# Patient Record
Sex: MALE | Race: BLACK OR AFRICAN AMERICAN
[De-identification: names, ages, dates, MRNs, and addresses within clinical notes are randomized per-mention and may not be internally consistent; named-entity substitution may affect disease eponyms.]

---

## 2020-10-09 PROBLEM — Z00.00 ENCOUNTER FOR PREVENTIVE HEALTH EXAMINATION: Status: ACTIVE | Noted: 2020-10-09

## 2020-12-14 ENCOUNTER — APPOINTMENT (OUTPATIENT)
Dept: OTOLARYNGOLOGY | Facility: CLINIC | Age: 55
End: 2020-12-14
Payer: MEDICARE

## 2020-12-14 DIAGNOSIS — R09.82 POSTNASAL DRIP: ICD-10-CM

## 2020-12-14 DIAGNOSIS — H61.23 IMPACTED CERUMEN, BILATERAL: ICD-10-CM

## 2020-12-14 DIAGNOSIS — J02.9 ACUTE PHARYNGITIS, UNSPECIFIED: ICD-10-CM

## 2020-12-14 DIAGNOSIS — G47.30 SLEEP APNEA, UNSPECIFIED: ICD-10-CM

## 2020-12-14 PROCEDURE — 99203 OFFICE O/P NEW LOW 30 MIN: CPT | Mod: 25

## 2020-12-14 PROCEDURE — 69210 REMOVE IMPACTED EAR WAX UNI: CPT

## 2020-12-14 NOTE — PHYSICAL EXAM
[Midline] : trachea located in midline position [Normal] : no rashes [de-identified] : bilateral cerumen impaction

## 2020-12-14 NOTE — HISTORY OF PRESENT ILLNESS
[de-identified] : Patient here today with c/o clogged ears and sore throat. Patient has a h/o cerumen impaction.  He has had sore throat for one week. No dysphagia. He has PND, has used flonase in the past but is not currently using it.

## 2020-12-18 ENCOUNTER — APPOINTMENT (OUTPATIENT)
Dept: GASTROENTEROLOGY | Facility: CLINIC | Age: 55
End: 2020-12-18

## 2020-12-21 ENCOUNTER — APPOINTMENT (OUTPATIENT)
Dept: OTOLARYNGOLOGY | Facility: CLINIC | Age: 55
End: 2020-12-21

## 2021-01-29 ENCOUNTER — APPOINTMENT (OUTPATIENT)
Dept: GASTROENTEROLOGY | Facility: CLINIC | Age: 56
End: 2021-01-29

## 2021-02-23 ENCOUNTER — APPOINTMENT (OUTPATIENT)
Dept: OPHTHALMOLOGY | Facility: CLINIC | Age: 56
End: 2021-02-23

## 2021-03-08 ENCOUNTER — APPOINTMENT (OUTPATIENT)
Dept: INTERNAL MEDICINE | Facility: CLINIC | Age: 56
End: 2021-03-08

## 2021-04-08 ENCOUNTER — OUTPATIENT (OUTPATIENT)
Dept: OUTPATIENT SERVICES | Facility: HOSPITAL | Age: 56
LOS: 1 days | Discharge: HOME | End: 2021-04-08

## 2021-04-08 DIAGNOSIS — Z01.21 ENCOUNTER FOR DENTAL EXAMINATION AND CLEANING WITH ABNORMAL FINDINGS: ICD-10-CM

## 2021-06-16 ENCOUNTER — APPOINTMENT (OUTPATIENT)
Dept: PODIATRY | Facility: CLINIC | Age: 56
End: 2021-06-16

## 2021-07-12 ENCOUNTER — APPOINTMENT (OUTPATIENT)
Dept: PEDIATRIC DEVELOPMENTAL SERVICES | Facility: CLINIC | Age: 56
End: 2021-07-12

## 2021-10-04 ENCOUNTER — OUTPATIENT (OUTPATIENT)
Dept: OUTPATIENT SERVICES | Facility: HOSPITAL | Age: 56
LOS: 1 days | Discharge: HOME | End: 2021-10-04
Payer: MEDICARE

## 2021-10-04 VITALS
RESPIRATION RATE: 16 BRPM | OXYGEN SATURATION: 96 % | WEIGHT: 158.07 LBS | SYSTOLIC BLOOD PRESSURE: 110 MMHG | DIASTOLIC BLOOD PRESSURE: 64 MMHG | HEIGHT: 60 IN | HEART RATE: 54 BPM | TEMPERATURE: 98 F

## 2021-10-04 DIAGNOSIS — Z01.818 ENCOUNTER FOR OTHER PREPROCEDURAL EXAMINATION: ICD-10-CM

## 2021-10-04 DIAGNOSIS — K02.9 DENTAL CARIES, UNSPECIFIED: ICD-10-CM

## 2021-10-04 DIAGNOSIS — Z98.890 OTHER SPECIFIED POSTPROCEDURAL STATES: Chronic | ICD-10-CM

## 2021-10-04 PROCEDURE — 93010 ELECTROCARDIOGRAM REPORT: CPT

## 2021-10-04 NOTE — H&P PST ADULT - ADDITIONAL PE
follows simple commands, remained non-verbal, attempted to draw blood and he started screaming. Was unable to do an ENT exam -pt un co-operative.

## 2021-10-04 NOTE — H&P PST ADULT - NSANTHOSAYNRD_GEN_A_CORE
No. KARIE screening performed.  STOP BANG Legend: 0-2 = LOW Risk; 3-4 = INTERMEDIATE Risk; 5-8 = HIGH Risk

## 2021-10-04 NOTE — H&P PST ADULT - HISTORY OF PRESENT ILLNESS
56 yr old male with Down's syndrome accompanied by his mother and HHA. Mother is a poor historian "I am 84yrs old I don't remember that much". She states " they need to pull out teeth". Is scheduled for Complete oral rehab under [facility]. Recd COVID vaccine. Verbalized understanding of COVID prevention measures. Exercise becca 1-2 flat blocks LTD by poor co-ordination and balancePt un co-operative. Unable to draw blood. Needs CBC, CMP. Also, per his mother -will not be possible to do COVID test 10/19 because ". he will not let anybody touch him". Usually, labs are done after " I give him a pill and he is sleepy  and they come and draw blood at home". Labs and COVID PCR tests have to be co -ordinated ? possible same day-10/19? Needs PCP eval-ACP Dr. Pino.  Anesthesia Alert  YES--Difficult Airway  NO--History of neck surgery or radiation  NO--Limited ROM of neck  NO--History of Malignant hyperthermia  NO--Personal or family history of Pseudocholinesterase deficiency  NO--Prior Anesthesia Complication  NO--Latex Allergy  NO--Loose teeth  NO--History of Rheumatoid Arthritis  NO--KARIE  No Bleeding risk  YES H/O Down's [PT unco-operative was not able to get pre-op C.Spine views___

## 2021-10-19 ENCOUNTER — OUTPATIENT (OUTPATIENT)
Dept: OUTPATIENT SERVICES | Facility: HOSPITAL | Age: 56
LOS: 1 days | Discharge: HOME | End: 2021-10-19

## 2021-10-19 DIAGNOSIS — Z11.59 ENCOUNTER FOR SCREENING FOR OTHER VIRAL DISEASES: ICD-10-CM

## 2021-10-19 PROBLEM — F03.90 UNSPECIFIED DEMENTIA WITHOUT BEHAVIORAL DISTURBANCE: Chronic | Status: ACTIVE | Noted: 2021-10-04

## 2021-10-19 PROBLEM — Q90.9 DOWN SYNDROME, UNSPECIFIED: Chronic | Status: ACTIVE | Noted: 2021-10-04

## 2021-10-19 PROBLEM — F03.90 UNSPECIFIED DEMENTIA, UNSPECIFIED SEVERITY, WITHOUT BEHAVIORAL DISTURBANCE, PSYCHOTIC DISTURBANCE, MOOD DISTURBANCE, AND ANXIETY: Chronic | Status: ACTIVE | Noted: 2021-10-04

## 2021-10-22 ENCOUNTER — OUTPATIENT (OUTPATIENT)
Dept: OUTPATIENT SERVICES | Facility: HOSPITAL | Age: 56
LOS: 1 days | Discharge: HOME | End: 2021-10-22

## 2021-10-22 VITALS
HEIGHT: 60 IN | TEMPERATURE: 98 F | SYSTOLIC BLOOD PRESSURE: 108 MMHG | HEART RATE: 60 BPM | RESPIRATION RATE: 20 BRPM | WEIGHT: 158.07 LBS | DIASTOLIC BLOOD PRESSURE: 67 MMHG | OXYGEN SATURATION: 100 %

## 2021-10-22 VITALS
RESPIRATION RATE: 20 BRPM | HEART RATE: 60 BPM | DIASTOLIC BLOOD PRESSURE: 53 MMHG | SYSTOLIC BLOOD PRESSURE: 111 MMHG | OXYGEN SATURATION: 100 %

## 2021-10-22 DIAGNOSIS — K02.9 DENTAL CARIES, UNSPECIFIED: ICD-10-CM

## 2021-10-22 LAB
ALBUMIN SERPL ELPH-MCNC: 3.4 G/DL — LOW (ref 3.5–5.2)
ALP SERPL-CCNC: 81 U/L — SIGNIFICANT CHANGE UP (ref 30–115)
ALT FLD-CCNC: 9 U/L — SIGNIFICANT CHANGE UP (ref 0–41)
ANION GAP SERPL CALC-SCNC: 12 MMOL/L — SIGNIFICANT CHANGE UP (ref 7–14)
AST SERPL-CCNC: 19 U/L — SIGNIFICANT CHANGE UP (ref 0–41)
BASOPHILS # BLD AUTO: 0.07 K/UL — SIGNIFICANT CHANGE UP (ref 0–0.2)
BASOPHILS NFR BLD AUTO: 1.8 % — HIGH (ref 0–1)
BILIRUB SERPL-MCNC: 0.3 MG/DL — SIGNIFICANT CHANGE UP (ref 0.2–1.2)
BUN SERPL-MCNC: 13 MG/DL — SIGNIFICANT CHANGE UP (ref 10–20)
CALCIUM SERPL-MCNC: 8.3 MG/DL — LOW (ref 8.5–10.1)
CHLORIDE SERPL-SCNC: 103 MMOL/L — SIGNIFICANT CHANGE UP (ref 98–110)
CHOLEST SERPL-MCNC: 166 MG/DL — SIGNIFICANT CHANGE UP
CO2 SERPL-SCNC: 23 MMOL/L — SIGNIFICANT CHANGE UP (ref 17–32)
CREAT SERPL-MCNC: 0.7 MG/DL — SIGNIFICANT CHANGE UP (ref 0.7–1.5)
EOSINOPHIL # BLD AUTO: 0.1 K/UL — SIGNIFICANT CHANGE UP (ref 0–0.7)
EOSINOPHIL NFR BLD AUTO: 2.6 % — SIGNIFICANT CHANGE UP (ref 0–8)
GLUCOSE SERPL-MCNC: 107 MG/DL — HIGH (ref 70–99)
HCT VFR BLD CALC: 38 % — LOW (ref 42–52)
HDLC SERPL-MCNC: 42 MG/DL — SIGNIFICANT CHANGE UP
HGB BLD-MCNC: 12.8 G/DL — LOW (ref 14–18)
IMM GRANULOCYTES NFR BLD AUTO: 0.3 % — SIGNIFICANT CHANGE UP (ref 0.1–0.3)
LIPID PNL WITH DIRECT LDL SERPL: 109 MG/DL — HIGH
LYMPHOCYTES # BLD AUTO: 1.1 K/UL — LOW (ref 1.2–3.4)
LYMPHOCYTES # BLD AUTO: 28.8 % — SIGNIFICANT CHANGE UP (ref 20.5–51.1)
MCHC RBC-ENTMCNC: 31.6 PG — HIGH (ref 27–31)
MCHC RBC-ENTMCNC: 33.7 G/DL — SIGNIFICANT CHANGE UP (ref 32–37)
MCV RBC AUTO: 93.8 FL — SIGNIFICANT CHANGE UP (ref 80–94)
MONOCYTES # BLD AUTO: 0.33 K/UL — SIGNIFICANT CHANGE UP (ref 0.1–0.6)
MONOCYTES NFR BLD AUTO: 8.6 % — SIGNIFICANT CHANGE UP (ref 1.7–9.3)
NEUTROPHILS # BLD AUTO: 2.21 K/UL — SIGNIFICANT CHANGE UP (ref 1.4–6.5)
NEUTROPHILS NFR BLD AUTO: 57.9 % — SIGNIFICANT CHANGE UP (ref 42.2–75.2)
NON HDL CHOLESTEROL: 124 MG/DL — SIGNIFICANT CHANGE UP
NRBC # BLD: 0 /100 WBCS — SIGNIFICANT CHANGE UP (ref 0–0)
PLATELET # BLD AUTO: 258 K/UL — SIGNIFICANT CHANGE UP (ref 130–400)
POTASSIUM SERPL-MCNC: 4.1 MMOL/L — SIGNIFICANT CHANGE UP (ref 3.5–5)
POTASSIUM SERPL-SCNC: 4.1 MMOL/L — SIGNIFICANT CHANGE UP (ref 3.5–5)
PROT SERPL-MCNC: 6.1 G/DL — SIGNIFICANT CHANGE UP (ref 6–8)
RBC # BLD: 4.05 M/UL — LOW (ref 4.7–6.1)
RBC # FLD: 13.4 % — SIGNIFICANT CHANGE UP (ref 11.5–14.5)
SODIUM SERPL-SCNC: 138 MMOL/L — SIGNIFICANT CHANGE UP (ref 135–146)
T4 AB SER-ACNC: 7.2 UG/DL — SIGNIFICANT CHANGE UP (ref 4.6–12)
TRIGL SERPL-MCNC: 87 MG/DL — SIGNIFICANT CHANGE UP
TSH SERPL-MCNC: 1.43 UIU/ML — SIGNIFICANT CHANGE UP (ref 0.27–4.2)
WBC # BLD: 3.82 K/UL — LOW (ref 4.8–10.8)
WBC # FLD AUTO: 3.82 K/UL — LOW (ref 4.8–10.8)

## 2021-10-22 RX ORDER — MIDAZOLAM HYDROCHLORIDE 1 MG/ML
20 INJECTION, SOLUTION INTRAMUSCULAR; INTRAVENOUS ONCE
Refills: 0 | Status: DISCONTINUED | OUTPATIENT
Start: 2021-10-22 | End: 2021-10-22

## 2021-10-22 RX ORDER — ONDANSETRON 8 MG/1
4 TABLET, FILM COATED ORAL ONCE
Refills: 0 | Status: DISCONTINUED | OUTPATIENT
Start: 2021-10-22 | End: 2021-11-05

## 2021-10-22 RX ORDER — SODIUM CHLORIDE 9 MG/ML
1000 INJECTION, SOLUTION INTRAVENOUS
Refills: 0 | Status: DISCONTINUED | OUTPATIENT
Start: 2021-10-22 | End: 2021-11-05

## 2021-10-22 RX ORDER — HYDROMORPHONE HYDROCHLORIDE 2 MG/ML
0.5 INJECTION INTRAMUSCULAR; INTRAVENOUS; SUBCUTANEOUS
Refills: 0 | Status: DISCONTINUED | OUTPATIENT
Start: 2021-10-22 | End: 2021-10-22

## 2021-10-22 RX ADMIN — MIDAZOLAM HYDROCHLORIDE 20 MILLIGRAM(S): 1 INJECTION, SOLUTION INTRAMUSCULAR; INTRAVENOUS at 09:39

## 2021-10-22 RX ADMIN — SODIUM CHLORIDE 100 MILLILITER(S): 9 INJECTION, SOLUTION INTRAVENOUS at 12:18

## 2021-10-22 NOTE — ASU DISCHARGE PLAN (ADULT/PEDIATRIC) - ASU DC SPECIAL INSTRUCTIONSFT
Nothing hard/crunchy, nothing hot/spicy, nothing through a straw, no spitting for at least 48 hours. Soft/liquid diet, progress slowly. If any concerns please contact dental resident on call at [phone].

## 2021-10-22 NOTE — BRIEF OPERATIVE NOTE - NSICDXBRIEFPROCEDURE_GEN_ALL_CORE_FT
PROCEDURES:  Dental exam, with x-ray imaging, dental cleaning, and restoration 22-Oct-2021 10:13:23  Veronica Tobar

## 2021-10-22 NOTE — BRIEF OPERATIVE NOTE - OPERATION/FINDINGS
Complete Oral Rehabilitation included:  Full mouth Exam, _____ xrays, Prophylaxis and  Fluoride treatment  Extractions of teeth:  Composite restorations of teeth:  Amalgam restorations of teeth: Complete Oral Rehabilitation included:  Full mouth Exam, 14 xrays, Prophylaxis and  Fluoride treatment  Extractions of teeth: 10, 11, 17, 19, 28  Amalgam restorations of teeth: 20

## 2021-10-27 ENCOUNTER — OUTPATIENT (OUTPATIENT)
Dept: OUTPATIENT SERVICES | Facility: HOSPITAL | Age: 56
LOS: 1 days | Discharge: HOME | End: 2021-10-27

## 2021-10-27 DIAGNOSIS — Z98.818 OTHER DENTAL PROCEDURE STATUS: ICD-10-CM

## 2021-10-27 PROBLEM — Z86.69 PERSONAL HISTORY OF OTHER DISEASES OF THE NERVOUS SYSTEM AND SENSE ORGANS: Chronic | Status: ACTIVE | Noted: 2021-10-22

## 2021-10-28 DIAGNOSIS — K02.9 DENTAL CARIES, UNSPECIFIED: ICD-10-CM

## 2021-10-28 DIAGNOSIS — Z88.0 ALLERGY STATUS TO PENICILLIN: ICD-10-CM

## 2021-10-28 DIAGNOSIS — Q90.9 DOWN SYNDROME, UNSPECIFIED: ICD-10-CM

## 2021-10-28 DIAGNOSIS — F03.90 UNSPECIFIED DEMENTIA WITHOUT BEHAVIORAL DISTURBANCE: ICD-10-CM

## 2022-08-24 ENCOUNTER — OUTPATIENT (OUTPATIENT)
Dept: OUTPATIENT SERVICES | Facility: HOSPITAL | Age: 57
LOS: 1 days | Discharge: HOME | End: 2022-08-24

## 2022-09-06 DIAGNOSIS — Z01.20 ENCOUNTER FOR DENTAL EXAMINATION AND CLEANING WITHOUT ABNORMAL FINDINGS: ICD-10-CM

## 2023-09-29 NOTE — PRE-ANESTHESIA EVALUATION ADULT - NSANTHPEFT_GEN_ALL_CORE
.  Continue current dose atorvastatin 20 mg, we will plan on rechecking fasting lipid profile in the next 3 months and reevaluate medication most recent LDL was 139, and can adjust dosing if needed.  
Blood pressure is reasonably well controlled, continue current regiment.  
Overall her volume status is improved, and continues to have NYHA class II symptoms.  New medication regimen as outlined under heart failure.  Previous echo showed reduced EF of 36 to 40%.  Plan to repeat echocardiogram to reassess LV function.  
She is stable without any anginal symptoms.  Continue DAPT with aspirin and Plavix, continue carvedilol, and atorvastatin.  
Volume status significantly improved.  Continue carvedilol, lisinopril, and Farxiga.  We will continue using furosemide on a as needed basis for weight gain and swelling.  Avoiding spironolactone at this point due to hyponatremia during her hospitalization.  
lung cta bl  cv rrr s1s2

## 2023-10-05 ENCOUNTER — EMERGENCY (EMERGENCY)
Facility: HOSPITAL | Age: 58
LOS: 0 days | Discharge: HOME | End: 2023-10-05
Attending: EMERGENCY MEDICINE
Payer: MEDICARE

## 2023-10-05 VITALS
RESPIRATION RATE: 18 BRPM | TEMPERATURE: 98 F | WEIGHT: 143.08 LBS | SYSTOLIC BLOOD PRESSURE: 125 MMHG | OXYGEN SATURATION: 99 % | HEART RATE: 67 BPM | DIASTOLIC BLOOD PRESSURE: 60 MMHG

## 2023-10-05 VITALS
RESPIRATION RATE: 16 BRPM | DIASTOLIC BLOOD PRESSURE: 68 MMHG | SYSTOLIC BLOOD PRESSURE: 130 MMHG | HEART RATE: 68 BPM | OXYGEN SATURATION: 99 %

## 2023-10-05 DIAGNOSIS — R63.0 ANOREXIA: ICD-10-CM

## 2023-10-05 DIAGNOSIS — Q90.9 DOWN SYNDROME, UNSPECIFIED: ICD-10-CM

## 2023-10-05 DIAGNOSIS — Z88.0 ALLERGY STATUS TO PENICILLIN: ICD-10-CM

## 2023-10-05 DIAGNOSIS — F03.90 UNSPECIFIED DEMENTIA WITHOUT BEHAVIORAL DISTURBANCE: ICD-10-CM

## 2023-10-05 DIAGNOSIS — R10.31 RIGHT LOWER QUADRANT PAIN: ICD-10-CM

## 2023-10-05 LAB
ALBUMIN SERPL ELPH-MCNC: 4.1 G/DL — SIGNIFICANT CHANGE UP (ref 3.5–5.2)
ALP SERPL-CCNC: 89 U/L — SIGNIFICANT CHANGE UP (ref 30–115)
ALT FLD-CCNC: 10 U/L — SIGNIFICANT CHANGE UP (ref 0–41)
ANION GAP SERPL CALC-SCNC: 11 MMOL/L — SIGNIFICANT CHANGE UP (ref 7–14)
AST SERPL-CCNC: 28 U/L — SIGNIFICANT CHANGE UP (ref 0–41)
BASOPHILS # BLD AUTO: 0.04 K/UL — SIGNIFICANT CHANGE UP (ref 0–0.2)
BASOPHILS NFR BLD AUTO: 0.7 % — SIGNIFICANT CHANGE UP (ref 0–1)
BILIRUB DIRECT SERPL-MCNC: <0.2 MG/DL — SIGNIFICANT CHANGE UP (ref 0–0.3)
BILIRUB INDIRECT FLD-MCNC: >0.2 MG/DL — SIGNIFICANT CHANGE UP (ref 0.2–1.2)
BILIRUB SERPL-MCNC: 0.3 MG/DL — SIGNIFICANT CHANGE UP (ref 0.2–1.2)
BILIRUB SERPL-MCNC: 0.4 MG/DL — SIGNIFICANT CHANGE UP (ref 0.2–1.2)
BUN SERPL-MCNC: 15 MG/DL — SIGNIFICANT CHANGE UP (ref 10–20)
CALCIUM SERPL-MCNC: 9.8 MG/DL — SIGNIFICANT CHANGE UP (ref 8.4–10.4)
CHLORIDE SERPL-SCNC: 104 MMOL/L — SIGNIFICANT CHANGE UP (ref 98–110)
CO2 SERPL-SCNC: 26 MMOL/L — SIGNIFICANT CHANGE UP (ref 17–32)
CREAT SERPL-MCNC: 0.9 MG/DL — SIGNIFICANT CHANGE UP (ref 0.7–1.5)
EGFR: 100 ML/MIN/1.73M2 — SIGNIFICANT CHANGE UP
EOSINOPHIL # BLD AUTO: 0.28 K/UL — SIGNIFICANT CHANGE UP (ref 0–0.7)
EOSINOPHIL NFR BLD AUTO: 4.7 % — SIGNIFICANT CHANGE UP (ref 0–8)
GLUCOSE SERPL-MCNC: 100 MG/DL — HIGH (ref 70–99)
HCT VFR BLD CALC: 44.3 % — SIGNIFICANT CHANGE UP (ref 42–52)
HGB BLD-MCNC: 14.5 G/DL — SIGNIFICANT CHANGE UP (ref 14–18)
IMM GRANULOCYTES NFR BLD AUTO: 0.2 % — SIGNIFICANT CHANGE UP (ref 0.1–0.3)
LACTATE SERPL-SCNC: 3.8 MMOL/L — HIGH (ref 0.7–2)
LIDOCAIN IGE QN: 15 U/L — SIGNIFICANT CHANGE UP (ref 7–60)
LYMPHOCYTES # BLD AUTO: 1.81 K/UL — SIGNIFICANT CHANGE UP (ref 1.2–3.4)
LYMPHOCYTES # BLD AUTO: 30.6 % — SIGNIFICANT CHANGE UP (ref 20.5–51.1)
MAGNESIUM SERPL-MCNC: 2.1 MG/DL — SIGNIFICANT CHANGE UP (ref 1.8–2.4)
MCHC RBC-ENTMCNC: 31.9 PG — HIGH (ref 27–31)
MCHC RBC-ENTMCNC: 32.7 G/DL — SIGNIFICANT CHANGE UP (ref 32–37)
MCV RBC AUTO: 97.4 FL — HIGH (ref 80–94)
MONOCYTES # BLD AUTO: 0.42 K/UL — SIGNIFICANT CHANGE UP (ref 0.1–0.6)
MONOCYTES NFR BLD AUTO: 7.1 % — SIGNIFICANT CHANGE UP (ref 1.7–9.3)
NEUTROPHILS # BLD AUTO: 3.35 K/UL — SIGNIFICANT CHANGE UP (ref 1.4–6.5)
NEUTROPHILS NFR BLD AUTO: 56.7 % — SIGNIFICANT CHANGE UP (ref 42.2–75.2)
NRBC # BLD: 0 /100 WBCS — SIGNIFICANT CHANGE UP (ref 0–0)
PLATELET # BLD AUTO: 233 K/UL — SIGNIFICANT CHANGE UP (ref 130–400)
PMV BLD: 10.2 FL — SIGNIFICANT CHANGE UP (ref 7.4–10.4)
POTASSIUM SERPL-MCNC: 4.2 MMOL/L — SIGNIFICANT CHANGE UP (ref 3.5–5)
POTASSIUM SERPL-SCNC: 4.2 MMOL/L — SIGNIFICANT CHANGE UP (ref 3.5–5)
PROT SERPL-MCNC: 7.9 G/DL — SIGNIFICANT CHANGE UP (ref 6–8)
RBC # BLD: 4.55 M/UL — LOW (ref 4.7–6.1)
RBC # FLD: 13.8 % — SIGNIFICANT CHANGE UP (ref 11.5–14.5)
SODIUM SERPL-SCNC: 141 MMOL/L — SIGNIFICANT CHANGE UP (ref 135–146)
WBC # BLD: 5.91 K/UL — SIGNIFICANT CHANGE UP (ref 4.8–10.8)
WBC # FLD AUTO: 5.91 K/UL — SIGNIFICANT CHANGE UP (ref 4.8–10.8)

## 2023-10-05 PROCEDURE — 74177 CT ABD & PELVIS W/CONTRAST: CPT | Mod: 26,MA

## 2023-10-05 PROCEDURE — 82247 BILIRUBIN TOTAL: CPT

## 2023-10-05 PROCEDURE — 80053 COMPREHEN METABOLIC PANEL: CPT

## 2023-10-05 PROCEDURE — 82248 BILIRUBIN DIRECT: CPT

## 2023-10-05 PROCEDURE — 36415 COLL VENOUS BLD VENIPUNCTURE: CPT

## 2023-10-05 PROCEDURE — 93010 ELECTROCARDIOGRAM REPORT: CPT

## 2023-10-05 PROCEDURE — 83605 ASSAY OF LACTIC ACID: CPT

## 2023-10-05 PROCEDURE — 99285 EMERGENCY DEPT VISIT HI MDM: CPT | Mod: FS

## 2023-10-05 PROCEDURE — 85025 COMPLETE CBC W/AUTO DIFF WBC: CPT

## 2023-10-05 PROCEDURE — 93005 ELECTROCARDIOGRAM TRACING: CPT

## 2023-10-05 PROCEDURE — 99285 EMERGENCY DEPT VISIT HI MDM: CPT | Mod: 25

## 2023-10-05 PROCEDURE — 83690 ASSAY OF LIPASE: CPT

## 2023-10-05 PROCEDURE — 83735 ASSAY OF MAGNESIUM: CPT

## 2023-10-05 PROCEDURE — 96372 THER/PROPH/DIAG INJ SC/IM: CPT

## 2023-10-05 PROCEDURE — 74177 CT ABD & PELVIS W/CONTRAST: CPT | Mod: MA

## 2023-10-05 RX ORDER — HALOPERIDOL DECANOATE 100 MG/ML
5 INJECTION INTRAMUSCULAR ONCE
Refills: 0 | Status: COMPLETED | OUTPATIENT
Start: 2023-10-05 | End: 2023-10-05

## 2023-10-05 RX ORDER — DIPHENHYDRAMINE HCL 50 MG
50 CAPSULE ORAL ONCE
Refills: 0 | Status: COMPLETED | OUTPATIENT
Start: 2023-10-05 | End: 2023-10-05

## 2023-10-05 RX ORDER — HALOPERIDOL DECANOATE 100 MG/ML
5 INJECTION INTRAMUSCULAR ONCE
Refills: 0 | Status: DISCONTINUED | OUTPATIENT
Start: 2023-10-05 | End: 2023-10-05

## 2023-10-05 RX ORDER — DIPHENHYDRAMINE HCL 50 MG
25 CAPSULE ORAL ONCE
Refills: 0 | Status: DISCONTINUED | OUTPATIENT
Start: 2023-10-05 | End: 2023-10-05

## 2023-10-05 RX ORDER — SODIUM CHLORIDE 9 MG/ML
1000 INJECTION INTRAMUSCULAR; INTRAVENOUS; SUBCUTANEOUS ONCE
Refills: 0 | Status: COMPLETED | OUTPATIENT
Start: 2023-10-05 | End: 2023-10-05

## 2023-10-05 RX ADMIN — Medication 2 MILLIGRAM(S): at 16:48

## 2023-10-05 RX ADMIN — Medication 1 MILLIGRAM(S): at 15:23

## 2023-10-05 RX ADMIN — Medication 50 MILLIGRAM(S): at 16:48

## 2023-10-05 RX ADMIN — HALOPERIDOL DECANOATE 5 MILLIGRAM(S): 100 INJECTION INTRAMUSCULAR at 16:47

## 2023-10-05 RX ADMIN — Medication 1 MILLIGRAM(S): at 13:53

## 2023-10-05 RX ADMIN — HALOPERIDOL DECANOATE 5 MILLIGRAM(S): 100 INJECTION INTRAMUSCULAR at 15:23

## 2023-10-05 RX ADMIN — SODIUM CHLORIDE 1000 MILLILITER(S): 9 INJECTION INTRAMUSCULAR; INTRAVENOUS; SUBCUTANEOUS at 17:52

## 2023-10-05 NOTE — ED PROVIDER NOTE - CARE PROVIDER_API CALL
Anne Wilson  Gastroenterology  [address] [place] [address]  Phone: [phone]  Fax: [phone]  Follow Up Time:     Gabriele Rodrigues  Gastroenterology  [address] [place] [id number]  Phone: [phone]  Fax: [phone]  Follow Up Time:

## 2023-10-05 NOTE — ED PROVIDER NOTE - PATIENT PORTAL LINK FT
You can access the FollowMyHealth Patient Portal offered by [facility] by registering at the following website: [email or web address]. By joining [employer]’s FollowMyHealth portal, you will also be able to view your health information using other applications (apps) compatible with our system.

## 2023-10-05 NOTE — ED PROVIDER NOTE - NSFOLLOWUPINSTRUCTIONS_ED_ALL_ED_FT
Please follow up with GI outpatient during next available appointment.     abdominal Pain    WHAT YOU NEED TO KNOW:    The cause of your abdominal pain may not be found. If a cause is found, treatment will depend on what the cause is.     DISCHARGE INSTRUCTIONS:    Return to the emergency department if:     You vomit blood or cannot stop vomiting.      You have blood in your bowel movement or it looks like tar.       You have bleeding from your rectum.       Your abdomen is larger than usual, more painful, and hard.       You have severe pain in your abdomen.       You stop passing gas and having bowel movements.       You feel weak, dizzy, or faint.    Contact your healthcare provider if:     You have a fever.      You have new signs and symptoms.      Your symptoms do not get better with treatment.       You have questions or concerns about your condition or care.    Medicines may be given to decrease pain, treat an infection, and manage your symptoms. Take your medicine as directed. Call your healthcare provider if you think your medicine is not helping or if you have side effects. Tell him if you are allergic to any medicine. Keep a list of the medicines, vitamins, and herbs you take. Include the amounts, and when and why you take them. Bring the list or the pill bottles to follow-up visits. Carry your medicine list with you in case of an emergency.    Manage your symptoms:     Apply heat on your abdomen for 20 to 30 minutes every 2 hours for as many days as directed. Heat helps decrease pain and muscle spasms.       Manage your stress. Stress may cause abdominal pain. Your healthcare provider may recommend relaxation techniques and deep breathing exercises to help decrease your stress. Your healthcare provider may recommend you talk to someone about your stress or anxiety, such as a counselor or a trusted friend. Get plenty of sleep and exercise regularly.       Limit or do not drink alcohol. Alcohol can make your abdominal pain worse. Ask your healthcare provider if it is safe for you to drink alcohol. Also ask how much is safe for you to drink.       Do not smoke. Nicotine and other chemicals in cigarettes can damage your esophagus and stomach. Ask your healthcare provider for information if you currently smoke and need help to quit. E-cigarettes or smokeless tobacco still contain nicotine. Talk to your healthcare provider before you use these products.     Make changes to the food you eat as directed: Do not eat foods that cause abdominal pain or other symptoms. Eat small meals more often.     Eat more high-fiber foods if you are constipated. High-fiber foods include fruits, vegetables, whole-grain foods, and legumes.       Do not eat foods that cause gas if you have bloating. Examples include broccoli, cabbage, and cauliflower. Do not drink soda or carbonated drinks, because these may also cause gas.       Do not eat foods or drinks that contain sorbitol or fructose if you have diarrhea and bloating. Some examples are fruit juices, candy, jelly, and sugar-free gum.       Do not eat high-fat foods, such as fried foods, cheeseburgers, hot dogs, and desserts.      Limit or do not drink caffeine. Caffeine may make symptoms, such as heart burn or nausea, worse.       Drink plenty of liquids to prevent dehydration from diarrhea or vomiting. Ask your healthcare provider how much liquid to drink each day and which liquids are best for you.     Follow up with your healthcare provider as directed: Write down your questions so you remember to ask them during your visits.       © Copyright [employer] 2019 All illustrations and images included in CareNotes are the copyrighted property of [employer].A[employer]., Inc. or [employer]

## 2023-10-05 NOTE — ED PROVIDER NOTE - PHYSICAL EXAMINATION
Physical Exam    Constitutional: No acute distress. Non verbal  Eyes: Conjunctiva pink, Sclera clear, PERRLA, EOMI.  ENT: No sinus tenderness. No nasal discharge. No oropharyngeal erythema, edema, or exudates. Uvula midline.   Cardiovascular: Regular rate, regular rhythm. No noted murmurs rubs or gallops.  Respiratory: unlabored respiratory effort, clear to auscultation bilaterally no wheezing, rales or rhonchi  Gastrointestinal: Normal bowel sounds. soft, non distended. facial expression appears distressed when palpating abdomen  Musculoskeletal: supple neck, no midline tenderness.   Integumentary: warm, dry, no rash  Neurologic: awake, non verbal. cranial nerves II-XII grossly intact, extremities’ motor and sensory functions grossly intact  Psych: intellectually disabled, agitated while attempting to examine

## 2023-10-05 NOTE — ED PROVIDER NOTE - NS ED ATTENDING STATEMENT MOD
This was a shared visit with the GRATN. I reviewed and verified the documentation and independently performed the documented:

## 2023-10-05 NOTE — ED PROVIDER NOTE - OBJECTIVE STATEMENT
58 year old male with a history of Down's syndrome accompanied by his HHA and Mother presents to the ED with abdominal pain. 58 year old male with a history of Down's syndrome accompanied by his HHA and Mother presents to the ED with abdominal pain. Patient is non verbal therefore history obtained from patients mother. Patient has been noted to hold his RLQ of his abdomen consistently x 4 months and seems to his Mother that he is in pain. He has had unwanted weight loss at home and decreased appetite, unsure exact amount but used to wear size 38 pants and now wears 34. No noted fevers, cough, shortness of breath, vomiting, diarrhea, bloody stools, trauma.

## 2023-10-05 NOTE — ED PROVIDER NOTE - CARE PROVIDERS DIRECT ADDRESSES
[email or web address]

## 2023-10-05 NOTE — ED PROVIDER NOTE - CLINICAL SUMMARY MEDICAL DECISION MAKING FREE TEXT BOX
PT with R abd pain?, weight loss, for a few months.  here for eval.  required several doses of sedative med to obtain labs and imaging.  neg w/u in ED.  pt to f/u with pmd. dr. tavares.  Any ordered labs and EKG were reviewed.  Any imaging was ordered and reviewed by me.  Appropriate medications for patient's presenting complaints were ordered and effects were reassessed.  Patient's records (prior hospital, ED visit, and/or nursing home notes if available) were reviewed.  Additional history was obtained from EMS, family, and/or PCP (where available).  Escalation to admission/observation was considered.  1) However patient feels much better and is comfortable with discharge.  Appropriate follow-up was arranged.

## 2023-10-05 NOTE — ED ADULT TRIAGE NOTE - CHIEF COMPLAINT QUOTE
Patient presents to ED with complaints of R sided abdominal pain since yesterday. Mother reports weight loss over 5 months. SNP/nonverbal at baseline/Dementia

## 2023-10-05 NOTE — ED PROVIDER NOTE - PROGRESS NOTE DETAILS
EP: Patient Dors to Dr. Yi to follow-up labs, imaging and reassess and dispo. SHRUTI Kitchen - Multiple attempts at IV attempted, ultimately succeeded after 2 sedation attempts. Workup negative and results were explained to patients mother. Patient will be discharged with GI follow up. SHRUTI Kitchen - Multiple attempts at IV attempted with sedation, ultimately succeeded after 2 sedation attempts. Workup negative and results were explained to patients mother. Patient will be discharged with GI follow up.

## 2023-10-05 NOTE — ED PROVIDER NOTE - ATTENDING APP SHARED VISIT CONTRIBUTION OF CARE
58-year-old male PMH dementia, nonverbal, Down syndrome presenting from home with mother and home health aide for evaluation of weight loss for the past several months.  In addition, mother reports that her son has been holding his right side  with his hand for the past several months.  No recent change in symptoms such as nausea vomiting, fever chills, cough, bloody stools, bloody urine.  Patient is a difficult to evaluate by a medical professional, did not have any recent blood work done, though had x-ray done at home for evaluation of these complaints. Middle-age male sitting in stretcher, does not appear to be in any acute distress, PERRL, stigmata of Down syndrome, lungs clear to auscultation bilaterally, equal air entry, no midline spine tenderness to palpation no CVA TTP, RRR, well-perfused extremities, abdomen soft/NT/ND, no leg edema, full range of motion at all joints, patient is awake and alert.  Plan: Labs, CT abdomen pelvis, reassess.  Will require sedation to facilitate medical evaluation.

## 2024-01-11 ENCOUNTER — APPOINTMENT (OUTPATIENT)
Dept: GASTROENTEROLOGY | Facility: CLINIC | Age: 59
End: 2024-01-11
Payer: MEDICARE

## 2024-01-11 VITALS — BODY MASS INDEX: 24.63 KG/M2 | HEIGHT: 63 IN | WEIGHT: 139 LBS

## 2024-01-11 DIAGNOSIS — Z86.59 PERSONAL HISTORY OF OTHER MENTAL AND BEHAVIORAL DISORDERS: ICD-10-CM

## 2024-01-11 DIAGNOSIS — Z78.9 OTHER SPECIFIED HEALTH STATUS: ICD-10-CM

## 2024-01-11 DIAGNOSIS — Z82.49 FAMILY HISTORY OF ISCHEMIC HEART DISEASE AND OTHER DISEASES OF THE CIRCULATORY SYSTEM: ICD-10-CM

## 2024-01-11 DIAGNOSIS — R10.9 UNSPECIFIED ABDOMINAL PAIN: ICD-10-CM

## 2024-01-11 DIAGNOSIS — R63.4 ABNORMAL WEIGHT LOSS: ICD-10-CM

## 2024-01-11 DIAGNOSIS — K59.00 CONSTIPATION, UNSPECIFIED: ICD-10-CM

## 2024-01-11 DIAGNOSIS — Z12.11 ENCOUNTER FOR SCREENING FOR MALIGNANT NEOPLASM OF COLON: ICD-10-CM

## 2024-01-11 PROCEDURE — 99204 OFFICE O/P NEW MOD 45 MIN: CPT

## 2024-01-11 RX ORDER — POLYETHYLENE GLYCOL 3350 AND ELECTROLYTES WITH LEMON FLAVOR 236; 22.74; 6.74; 5.86; 2.97 G/4L; G/4L; G/4L; G/4L; G/4L
236 POWDER, FOR SOLUTION ORAL
Qty: 1 | Refills: 1 | Status: ACTIVE | COMMUNITY
Start: 2024-01-11 | End: 1900-01-01

## 2024-01-11 RX ORDER — STANDARDIZED SENNA CONCENTRATE 8.6 MG/1
8.6 TABLET ORAL
Refills: 0 | Status: ACTIVE | COMMUNITY
Start: 2024-01-11

## 2024-01-11 RX ORDER — ASPIRIN 81 MG
6.5 TABLET, DELAYED RELEASE (ENTERIC COATED) ORAL
Qty: 1 | Refills: 3 | Status: DISCONTINUED | COMMUNITY
Start: 2020-12-14 | End: 2024-01-11

## 2024-01-11 RX ORDER — FLUTICASONE PROPIONATE 50 UG/1
50 SPRAY, METERED NASAL DAILY
Qty: 1 | Refills: 3 | Status: DISCONTINUED | COMMUNITY
Start: 2020-12-14 | End: 2024-01-11

## 2024-01-11 NOTE — REVIEW OF SYSTEMS
[Recent Weight Loss (___ Lbs)] : recent [unfilled] ~Ulb weight loss [Abdominal Pain] : abdominal pain [Constipation] : constipation [Negative] : Heme/Lymph [As Noted in HPI] : as noted in HPI [Vomiting] : no vomiting [Diarrhea] : no diarrhea [Heartburn] : no heartburn [Melena (black stool)] : no melena [Bleeding] : no bleeding [Fecal Incontinence (soiling)] : no fecal incontinence [Bloating (gassiness)] : no bloating

## 2024-01-11 NOTE — PHYSICAL EXAM
[Alert] : alert [No Acute Distress] : no acute distress [Well Developed] : well developed [Well Nourished] : well nourished [Sclera] : the sclera and conjunctiva were normal [Hearing Threshold Finger Rub Not [place]] : hearing was normal [Normal Lips/Gums] : the lips and gums were normal [Oropharynx] : the oropharynx was normal [Normal Appearance] : the appearance of the neck was normal [No Neck Mass] : no neck mass was observed [No Respiratory Distress] : no respiratory distress [No Acc Muscle Use] : no accessory muscle use [Respiration, Rhythm And Depth] : normal respiratory rhythm and effort [Auscultation Breath Sounds / Voice Sounds] : lungs were clear to auscultation bilaterally [Heart Rate And Rhythm] : heart rate was normal and rhythm regular [Normal S1, S2] : normal S1 and S2 [Murmurs] : no murmurs [de-identified] : Nonverbal

## 2024-01-11 NOTE — ASSESSMENT
[FreeTextEntry1] : 58 yr old male with Down's Syndrome and Dementia at average risk for CRC went to the ED on 10/5/23 for chronic RLQ pain. According to his mother, he is nonverbal but he has been holding his RLQ for the past year. She also stated that he has lost about 20 lbs in the past year. She stated that he has a good appetite and eats well. He has a long H/O constipation; he takes Senokot q day and Miralax without BMs q day. His last BM was 3 days ago. His mother stated he has not had any heartburn, blood in the stool, melena, vomiting, dysphagia. CT scan of abdomen showed no SBO, normal appendix, stool was noted throughout the colon, under distended, thickened bladder wall, recommended correleating with a U/A. CBC, CMP, and lipase were normal. Serum lactate increased at 3.8.    CRC Screening Weight loss/Abdominal pain - Will do an EGd/Colonscopy to further assess; risks and benefits discussed; he will need to be the last case of the day because he will need a lot of staff to start the IV, etc with increased time needed - F/U after the procedure is done - He will need to take a bowel regimen of colace 300 mg a day, senokot 2 tabs a day, and Miralax bid x 2 weeks before the procedure - CT scan of Abdomen and CBC, CMP, lipase all unrevealing  Constipation - Senokot 2 at hs, colace 100 mg 3 OD, and Miralax OD to bid recommended

## 2024-01-11 NOTE — HISTORY OF PRESENT ILLNESS
[FreeTextEntry1] : 58 yr old male with Down's Syndrome and Dementia at average risk for CRC went to the ED on 10/5/23 for chronic RLQ pain. According to his mother, he is nonverbal but he has been holding his RLQ for the past year. She also stated that he has lost about 20 lbs in the past year. She stated that he has a good appetite and eats well. He has a long H/O constipation; he takes Senokot q day and Miralax without BMs q day. His last BM was 3 days ago. His mother stated he has not had any heartburn, blood in the stool, melena, vomiting, dysphagia. CT scan of abdomen showed no SBO, normal appendix, stool was noted throughout the colon, under distended, thickened bladder wall, recommended correleating with a U/A. CBC, CMP, and lipase were normal. Serum lactate increased at 3.8.

## 2024-07-03 ENCOUNTER — INPATIENT (INPATIENT)
Facility: HOSPITAL | Age: 59
LOS: 11 days | Discharge: HOME | DRG: 392 | End: 2024-07-15
Attending: STUDENT IN AN ORGANIZED HEALTH CARE EDUCATION/TRAINING PROGRAM | Admitting: STUDENT IN AN ORGANIZED HEALTH CARE EDUCATION/TRAINING PROGRAM
Payer: MEDICARE

## 2024-07-03 VITALS
SYSTOLIC BLOOD PRESSURE: 143 MMHG | HEART RATE: 58 BPM | WEIGHT: 139.99 LBS | TEMPERATURE: 98 F | DIASTOLIC BLOOD PRESSURE: 109 MMHG | RESPIRATION RATE: 18 BRPM | OXYGEN SATURATION: 96 %

## 2024-07-03 DIAGNOSIS — Q90.9 DOWN SYNDROME, UNSPECIFIED: ICD-10-CM

## 2024-07-03 DIAGNOSIS — K66.8 OTHER SPECIFIED DISORDERS OF PERITONEUM: ICD-10-CM

## 2024-07-03 DIAGNOSIS — Z88.0 ALLERGY STATUS TO PENICILLIN: ICD-10-CM

## 2024-07-03 DIAGNOSIS — K59.09 OTHER CONSTIPATION: ICD-10-CM

## 2024-07-03 DIAGNOSIS — R63.4 ABNORMAL WEIGHT LOSS: ICD-10-CM

## 2024-07-03 DIAGNOSIS — F03.911 UNSPECIFIED DEMENTIA, UNSPECIFIED SEVERITY, WITH AGITATION: ICD-10-CM

## 2024-07-03 DIAGNOSIS — R10.9 UNSPECIFIED ABDOMINAL PAIN: ICD-10-CM

## 2024-07-03 DIAGNOSIS — E27.9 DISORDER OF ADRENAL GLAND, UNSPECIFIED: ICD-10-CM

## 2024-07-03 LAB
ALBUMIN SERPL ELPH-MCNC: 3.9 G/DL — SIGNIFICANT CHANGE UP (ref 3.5–5.2)
ALP SERPL-CCNC: 78 U/L — SIGNIFICANT CHANGE UP (ref 30–115)
ALT FLD-CCNC: 9 U/L — SIGNIFICANT CHANGE UP (ref 0–41)
ANION GAP SERPL CALC-SCNC: 12 MMOL/L — SIGNIFICANT CHANGE UP (ref 7–14)
APPEARANCE UR: CLEAR — SIGNIFICANT CHANGE UP
AST SERPL-CCNC: 31 U/L — SIGNIFICANT CHANGE UP (ref 0–41)
BASE EXCESS BLDV CALC-SCNC: -19.5 MMOL/L — LOW (ref -2–3)
BASOPHILS # BLD AUTO: 0.08 K/UL — SIGNIFICANT CHANGE UP (ref 0–0.2)
BASOPHILS NFR BLD AUTO: 1.2 % — HIGH (ref 0–1)
BILIRUB SERPL-MCNC: 0.6 MG/DL — SIGNIFICANT CHANGE UP (ref 0.2–1.2)
BILIRUB UR-MCNC: NEGATIVE — SIGNIFICANT CHANGE UP
BUN SERPL-MCNC: 17 MG/DL — SIGNIFICANT CHANGE UP (ref 10–20)
CA-I SERPL-SCNC: 0.42 MMOL/L — CRITICAL LOW (ref 1.15–1.33)
CALCIUM SERPL-MCNC: 9 MG/DL — SIGNIFICANT CHANGE UP (ref 8.4–10.5)
CHLORIDE SERPL-SCNC: 105 MMOL/L — SIGNIFICANT CHANGE UP (ref 98–110)
CO2 SERPL-SCNC: 21 MMOL/L — SIGNIFICANT CHANGE UP (ref 17–32)
COLOR SPEC: YELLOW — SIGNIFICANT CHANGE UP
CREAT SERPL-MCNC: 0.9 MG/DL — SIGNIFICANT CHANGE UP (ref 0.7–1.5)
DIFF PNL FLD: NEGATIVE — SIGNIFICANT CHANGE UP
EGFR: 100 ML/MIN/1.73M2 — SIGNIFICANT CHANGE UP
EOSINOPHIL # BLD AUTO: 0.2 K/UL — SIGNIFICANT CHANGE UP (ref 0–0.7)
EOSINOPHIL NFR BLD AUTO: 3 % — SIGNIFICANT CHANGE UP (ref 0–8)
GAS PNL BLDV: 144 MMOL/L — SIGNIFICANT CHANGE UP (ref 136–145)
GAS PNL BLDV: SIGNIFICANT CHANGE UP
GLUCOSE SERPL-MCNC: 107 MG/DL — HIGH (ref 70–99)
GLUCOSE UR QL: NEGATIVE MG/DL — SIGNIFICANT CHANGE UP
HCO3 BLDV-SCNC: 6 MMOL/L — CRITICAL LOW (ref 22–29)
HCT VFR BLD CALC: 42.6 % — SIGNIFICANT CHANGE UP (ref 42–52)
HCT VFR BLDA CALC: 14 % — CRITICAL LOW (ref 39–51)
HGB BLD CALC-MCNC: 4.6 G/DL — CRITICAL LOW (ref 12.6–17.4)
HGB BLD-MCNC: 14.3 G/DL — SIGNIFICANT CHANGE UP (ref 14–18)
IMM GRANULOCYTES NFR BLD AUTO: 0.3 % — SIGNIFICANT CHANGE UP (ref 0.1–0.3)
KETONES UR-MCNC: NEGATIVE MG/DL — SIGNIFICANT CHANGE UP
LACTATE BLDV-MCNC: 2.1 MMOL/L — HIGH (ref 0.5–2)
LEUKOCYTE ESTERASE UR-ACNC: NEGATIVE — SIGNIFICANT CHANGE UP
LIDOCAIN IGE QN: 14 U/L — SIGNIFICANT CHANGE UP (ref 7–60)
LYMPHOCYTES # BLD AUTO: 1.91 K/UL — SIGNIFICANT CHANGE UP (ref 1.2–3.4)
LYMPHOCYTES # BLD AUTO: 28.9 % — SIGNIFICANT CHANGE UP (ref 20.5–51.1)
MAGNESIUM SERPL-MCNC: 2 MG/DL — SIGNIFICANT CHANGE UP (ref 1.8–2.4)
MCHC RBC-ENTMCNC: 32.4 PG — HIGH (ref 27–31)
MCHC RBC-ENTMCNC: 33.6 G/DL — SIGNIFICANT CHANGE UP (ref 32–37)
MCV RBC AUTO: 96.4 FL — HIGH (ref 80–94)
MONOCYTES # BLD AUTO: 0.61 K/UL — HIGH (ref 0.1–0.6)
MONOCYTES NFR BLD AUTO: 9.2 % — SIGNIFICANT CHANGE UP (ref 1.7–9.3)
NEUTROPHILS # BLD AUTO: 3.8 K/UL — SIGNIFICANT CHANGE UP (ref 1.4–6.5)
NEUTROPHILS NFR BLD AUTO: 57.4 % — SIGNIFICANT CHANGE UP (ref 42.2–75.2)
NITRITE UR-MCNC: NEGATIVE — SIGNIFICANT CHANGE UP
NRBC # BLD: 0 /100 WBCS — SIGNIFICANT CHANGE UP (ref 0–0)
PCO2 BLDV: <19 MMHG — LOW (ref 42–55)
PH BLDV: 7.22 — LOW (ref 7.32–7.43)
PH UR: 6.5 — SIGNIFICANT CHANGE UP (ref 5–8)
PLATELET # BLD AUTO: 222 K/UL — SIGNIFICANT CHANGE UP (ref 130–400)
PMV BLD: 9.8 FL — SIGNIFICANT CHANGE UP (ref 7.4–10.4)
PO2 BLDV: 34 MMHG — SIGNIFICANT CHANGE UP (ref 25–45)
POTASSIUM SERPL-MCNC: SIGNIFICANT CHANGE UP MMOL/L (ref 3.5–5)
POTASSIUM SERPL-SCNC: SIGNIFICANT CHANGE UP MMOL/L (ref 3.5–5)
PROT SERPL-MCNC: 7 G/DL — SIGNIFICANT CHANGE UP (ref 6–8)
PROT UR-MCNC: NEGATIVE MG/DL — SIGNIFICANT CHANGE UP
RBC # BLD: 4.42 M/UL — LOW (ref 4.7–6.1)
RBC # FLD: 13.8 % — SIGNIFICANT CHANGE UP (ref 11.5–14.5)
SAO2 % BLDV: 95.4 % — HIGH (ref 67–88)
SODIUM SERPL-SCNC: 138 MMOL/L — SIGNIFICANT CHANGE UP (ref 135–146)
SP GR SPEC: 1.02 — SIGNIFICANT CHANGE UP (ref 1–1.03)
UROBILINOGEN FLD QL: 0.2 MG/DL — SIGNIFICANT CHANGE UP (ref 0.2–1)
WBC # BLD: 6.62 K/UL — SIGNIFICANT CHANGE UP (ref 4.8–10.8)
WBC # FLD AUTO: 6.62 K/UL — SIGNIFICANT CHANGE UP (ref 4.8–10.8)

## 2024-07-03 PROCEDURE — 74177 CT ABD & PELVIS W/CONTRAST: CPT | Mod: 26,MC

## 2024-07-03 PROCEDURE — 99285 EMERGENCY DEPT VISIT HI MDM: CPT | Mod: FS

## 2024-07-03 PROCEDURE — 93010 ELECTROCARDIOGRAM REPORT: CPT

## 2024-07-03 PROCEDURE — 83605 ASSAY OF LACTIC ACID: CPT

## 2024-07-03 PROCEDURE — 83735 ASSAY OF MAGNESIUM: CPT

## 2024-07-03 PROCEDURE — 87040 BLOOD CULTURE FOR BACTERIA: CPT

## 2024-07-03 PROCEDURE — 82330 ASSAY OF CALCIUM: CPT

## 2024-07-03 PROCEDURE — 85014 HEMATOCRIT: CPT

## 2024-07-03 PROCEDURE — 84132 ASSAY OF SERUM POTASSIUM: CPT

## 2024-07-03 PROCEDURE — 85018 HEMOGLOBIN: CPT

## 2024-07-03 PROCEDURE — 36415 COLL VENOUS BLD VENIPUNCTURE: CPT

## 2024-07-03 PROCEDURE — 83835 ASSAY OF METANEPHRINES: CPT

## 2024-07-03 PROCEDURE — 74182 MRI ABDOMEN W/CONTRAST: CPT | Mod: MC

## 2024-07-03 PROCEDURE — 85025 COMPLETE CBC W/AUTO DIFF WBC: CPT

## 2024-07-03 PROCEDURE — 84100 ASSAY OF PHOSPHORUS: CPT

## 2024-07-03 PROCEDURE — 84295 ASSAY OF SERUM SODIUM: CPT

## 2024-07-03 PROCEDURE — A9579: CPT

## 2024-07-03 PROCEDURE — 71045 X-RAY EXAM CHEST 1 VIEW: CPT | Mod: 26

## 2024-07-03 PROCEDURE — 82803 BLOOD GASES ANY COMBINATION: CPT

## 2024-07-03 PROCEDURE — 80053 COMPREHEN METABOLIC PANEL: CPT

## 2024-07-03 RX ORDER — SODIUM CHLORIDE 0.9 % (FLUSH) 0.9 %
1000 SYRINGE (ML) INJECTION ONCE
Refills: 0 | Status: COMPLETED | OUTPATIENT
Start: 2024-07-03 | End: 2024-07-03

## 2024-07-03 RX ORDER — HALOPERIDOL DECANOATE 100 MG/ML
5 VIAL (ML) INTRAMUSCULAR ONCE
Refills: 0 | Status: COMPLETED | OUTPATIENT
Start: 2024-07-03 | End: 2024-07-03

## 2024-07-03 RX ORDER — LORAZEPAM 0.5 MG
2 TABLET ORAL ONCE
Refills: 0 | Status: DISCONTINUED | OUTPATIENT
Start: 2024-07-03 | End: 2024-07-03

## 2024-07-03 RX ORDER — LORAZEPAM 0.5 MG
1 TABLET ORAL ONCE
Refills: 0 | Status: DISCONTINUED | OUTPATIENT
Start: 2024-07-03 | End: 2024-07-03

## 2024-07-03 RX ORDER — IOHEXOL 350 MG/ML
30 INJECTION, SOLUTION INTRAVENOUS ONCE
Refills: 0 | Status: COMPLETED | OUTPATIENT
Start: 2024-07-03 | End: 2024-07-03

## 2024-07-03 RX ADMIN — Medication 1000 MILLILITER(S): at 12:12

## 2024-07-03 RX ADMIN — Medication 2 MILLIGRAM(S): at 15:42

## 2024-07-03 RX ADMIN — Medication 5 MILLIGRAM(S): at 11:29

## 2024-07-03 RX ADMIN — IOHEXOL 30 MILLILITER(S): 350 INJECTION, SOLUTION INTRAVENOUS at 12:30

## 2024-07-03 NOTE — ED ADULT NURSE NOTE - NS ED NURSE REPORT GIVEN DT
Intubation    Date/Time: 4/8/2024 9:16 AM    Performed by: Oneil Conklin CRNA  Authorized by: Deisy Horvath MD    Intubation:     Induction:  Intravenous    Intubated:  Postinduction    Mask Ventilation:  Easy mask    Attempts:  1    Attempted By:  CRNA    Method of Intubation:  Direct    Blade:  Andrei 4    Laryngeal View Grade: Grade I - full view of cords      Difficult Airway Encountered?: No      Complications:  None    Airway Device:  Oral endotracheal tube    Airway Device Size:  7.5    Style/Cuff Inflation:  Cuffed (inflated to minimal occlusive pressure)    Inflation Amount (mL):  7    Tube secured:  23    Secured at:  The lips    Placement Verified By:  Capnometry    Complicating Factors:  None    Findings Post-Intubation:  BS equal bilateral and atraumatic/condition of teeth unchanged       03-Jul-2024 22:43

## 2024-07-03 NOTE — ED PROVIDER NOTE - PHYSICAL EXAMINATION
VITAL SIGNS: I have reviewed nursing notes and confirm.  CONSTITUTIONAL: in no acute distress.  SKIN: Skin exam is warm and dry, no acute rash.  HEAD: Normocephalic; atraumatic.  EYES: EOM intact; conjunctiva and sclera clear.  ENT: No nasal discharge; airway clear.   CARD: S1, S2 normal; no murmurs, gallops, or rubs. Regular rate and rhythm.  RESP: No wheezes, rales or rhonchi  ABD: soft; non-distended; non-tender; No rebound or guarding. No CVA tenderness.

## 2024-07-03 NOTE — ED PROVIDER NOTE - ATTENDING APP SHARED VISIT CONTRIBUTION OF CARE
57 yo m with pmh of T21, presents with c/o persisting R abd pain. parents at bedside admit pain has been going on for over a year.  pt had been seen in ED, had a ct and had f/u with gi.  pt was supposed to have cscope, but pt had refused to cooperate with the prep, so cscope was not done.  pt had seen dr. collado in the office.  pt is unable to provide history.  as per family, pt has lost weight significantly despite appearing to have normal appetite.  no fever or chills.  exam: nad, ncat, perrl, eomi, mmm, rrr, ctab, abd soft, nt, nd, alert, nonverbal imp: pt with persisting R abd pain, will check labs and rpt ct.

## 2024-07-03 NOTE — ED PROVIDER NOTE - CLINICAL SUMMARY MEDICAL DECISION MAKING FREE TEXT BOX
Pt with persisting R abd pain for over a year, weight loss, difficulty with outpt cscope due to prep and uncooperativeness, found to have a new mass.  will admit for further gi eval.  Any ordered labs and EKG were reviewed, Dr. Ivory Yi, attending physician.  Any imaging was ordered and reviewed by me, Dr. Ivory Yi, attending physician.  Appropriate medications for patient's presenting complaints were ordered and effects were reassessed.  Patient's records, if available,  (prior hospital, ED visit, and/or nursing home notes if available) were reviewed.  Additional history was obtained from EMS, family, and/or PCP (when available).  Escalation to admission/observation was considered.  Patient requires inpatient hospitalization - monitored setting.

## 2024-07-03 NOTE — ED ADULT NURSE NOTE - NSFALLHARMRISKINTERV_ED_ALL_ED
Assistance OOB with selected safe patient handling equipment if applicable/Communicate risk of Fall with Harm to all staff, patient, and family/Monitor for mental status changes and reorient to person, place, and time, as needed/Move patient closer to nursing station/within visual sight of ED staff/Provide visual cue: red socks, yellow wristband, yellow gown, etc/Reinforce activity limits and safety measures with patient and family/Toileting schedule using arm’s reach rule for commode and bathroom/Use of alarms - bed, stretcher, chair and/or video monitoring/Bed in lowest position, wheels locked, appropriate side rails in place/Call bell, personal items and telephone in reach/Instruct patient to call for assistance before getting out of bed/chair/stretcher/Non-slip footwear applied when patient is off stretcher/[place] to call system/Physically safe environment - no spills, clutter or unnecessary equipment/Purposeful Proactive Rounding/Room/bathroom lighting operational, light cord in reach

## 2024-07-03 NOTE — ED PROVIDER NOTE - OBJECTIVE STATEMENT
58-year-old male history of Down syndrome and Oseas presenting to ED patient brought in by mother.  Mother states that for the last few months patient has been holding left lower side and looking uncomfortable.  Patient unable to communicate concerns at bedside but mother states that she is also noted patient had decreased p.o. intake and has had significant weight loss over the last few months

## 2024-07-04 LAB
ALBUMIN SERPL ELPH-MCNC: 3.7 G/DL — SIGNIFICANT CHANGE UP (ref 3.5–5.2)
ALP SERPL-CCNC: 75 U/L — SIGNIFICANT CHANGE UP (ref 30–115)
ALT FLD-CCNC: 10 U/L — SIGNIFICANT CHANGE UP (ref 0–41)
ANION GAP SERPL CALC-SCNC: 10 MMOL/L — SIGNIFICANT CHANGE UP (ref 7–14)
AST SERPL-CCNC: 27 U/L — SIGNIFICANT CHANGE UP (ref 0–41)
BASOPHILS # BLD AUTO: 0.05 K/UL — SIGNIFICANT CHANGE UP (ref 0–0.2)
BASOPHILS NFR BLD AUTO: 1 % — SIGNIFICANT CHANGE UP (ref 0–1)
BILIRUB SERPL-MCNC: 0.5 MG/DL — SIGNIFICANT CHANGE UP (ref 0.2–1.2)
BUN SERPL-MCNC: 13 MG/DL — SIGNIFICANT CHANGE UP (ref 10–20)
CALCIUM SERPL-MCNC: 8.9 MG/DL — SIGNIFICANT CHANGE UP (ref 8.4–10.5)
CHLORIDE SERPL-SCNC: 106 MMOL/L — SIGNIFICANT CHANGE UP (ref 98–110)
CO2 SERPL-SCNC: 28 MMOL/L — SIGNIFICANT CHANGE UP (ref 17–32)
CREAT SERPL-MCNC: 0.8 MG/DL — SIGNIFICANT CHANGE UP (ref 0.7–1.5)
CULTURE RESULTS: SIGNIFICANT CHANGE UP
EGFR: 104 ML/MIN/1.73M2 — SIGNIFICANT CHANGE UP
EOSINOPHIL # BLD AUTO: 0.1 K/UL — SIGNIFICANT CHANGE UP (ref 0–0.7)
EOSINOPHIL NFR BLD AUTO: 2.1 % — SIGNIFICANT CHANGE UP (ref 0–8)
GAS PNL BLDA: SIGNIFICANT CHANGE UP
GLUCOSE SERPL-MCNC: 106 MG/DL — HIGH (ref 70–99)
HCT VFR BLD CALC: 43.3 % — SIGNIFICANT CHANGE UP (ref 42–52)
HGB BLD-MCNC: 14.5 G/DL — SIGNIFICANT CHANGE UP (ref 14–18)
IMM GRANULOCYTES NFR BLD AUTO: 0.2 % — SIGNIFICANT CHANGE UP (ref 0.1–0.3)
LYMPHOCYTES # BLD AUTO: 1.1 K/UL — LOW (ref 1.2–3.4)
LYMPHOCYTES # BLD AUTO: 22.8 % — SIGNIFICANT CHANGE UP (ref 20.5–51.1)
MAGNESIUM SERPL-MCNC: 1.8 MG/DL — SIGNIFICANT CHANGE UP (ref 1.8–2.4)
MCHC RBC-ENTMCNC: 32.2 PG — HIGH (ref 27–31)
MCHC RBC-ENTMCNC: 33.5 G/DL — SIGNIFICANT CHANGE UP (ref 32–37)
MCV RBC AUTO: 96 FL — HIGH (ref 80–94)
MONOCYTES # BLD AUTO: 0.34 K/UL — SIGNIFICANT CHANGE UP (ref 0.1–0.6)
MONOCYTES NFR BLD AUTO: 7 % — SIGNIFICANT CHANGE UP (ref 1.7–9.3)
NEUTROPHILS # BLD AUTO: 3.23 K/UL — SIGNIFICANT CHANGE UP (ref 1.4–6.5)
NEUTROPHILS NFR BLD AUTO: 66.9 % — SIGNIFICANT CHANGE UP (ref 42.2–75.2)
NRBC # BLD: 0 /100 WBCS — SIGNIFICANT CHANGE UP (ref 0–0)
PHOSPHATE SERPL-MCNC: 3.5 MG/DL — SIGNIFICANT CHANGE UP (ref 2.1–4.9)
PLATELET # BLD AUTO: 213 K/UL — SIGNIFICANT CHANGE UP (ref 130–400)
PMV BLD: 9.8 FL — SIGNIFICANT CHANGE UP (ref 7.4–10.4)
POTASSIUM SERPL-MCNC: 4.3 MMOL/L — SIGNIFICANT CHANGE UP (ref 3.5–5)
POTASSIUM SERPL-SCNC: 4.3 MMOL/L — SIGNIFICANT CHANGE UP (ref 3.5–5)
PROT SERPL-MCNC: 6.3 G/DL — SIGNIFICANT CHANGE UP (ref 6–8)
RBC # BLD: 4.51 M/UL — LOW (ref 4.7–6.1)
RBC # FLD: 13.6 % — SIGNIFICANT CHANGE UP (ref 11.5–14.5)
SODIUM SERPL-SCNC: 144 MMOL/L — SIGNIFICANT CHANGE UP (ref 135–146)
SPECIMEN SOURCE: SIGNIFICANT CHANGE UP
WBC # BLD: 4.83 K/UL — SIGNIFICANT CHANGE UP (ref 4.8–10.8)
WBC # FLD AUTO: 4.83 K/UL — SIGNIFICANT CHANGE UP (ref 4.8–10.8)

## 2024-07-04 PROCEDURE — 99223 1ST HOSP IP/OBS HIGH 75: CPT

## 2024-07-04 RX ORDER — LORAZEPAM 0.5 MG
1 TABLET ORAL ONCE
Refills: 0 | Status: DISCONTINUED | OUTPATIENT
Start: 2024-07-04 | End: 2024-07-04

## 2024-07-04 RX ORDER — LORAZEPAM 0.5 MG
2 TABLET ORAL ONCE
Refills: 0 | Status: DISCONTINUED | OUTPATIENT
Start: 2024-07-04 | End: 2024-07-04

## 2024-07-04 RX ORDER — SENNOSIDES 8.6 MG
2 TABLET ORAL
Refills: 0 | DISCHARGE

## 2024-07-04 RX ORDER — POLYETHYLENE GLYCOL 3350 1 G/G
17 POWDER ORAL EVERY 12 HOURS
Refills: 0 | Status: DISCONTINUED | OUTPATIENT
Start: 2024-07-04 | End: 2024-07-06

## 2024-07-04 RX ORDER — ENOXAPARIN SODIUM 100 MG/ML
40 INJECTION SUBCUTANEOUS EVERY 24 HOURS
Refills: 0 | Status: DISCONTINUED | OUTPATIENT
Start: 2024-07-04 | End: 2024-07-15

## 2024-07-04 RX ORDER — SENNOSIDES 8.6 MG
2 TABLET ORAL AT BEDTIME
Refills: 0 | Status: DISCONTINUED | OUTPATIENT
Start: 2024-07-04 | End: 2024-07-15

## 2024-07-04 RX ADMIN — Medication 2 TABLET(S): at 21:15

## 2024-07-04 RX ADMIN — ENOXAPARIN SODIUM 40 MILLIGRAM(S): 100 INJECTION SUBCUTANEOUS at 11:40

## 2024-07-04 RX ADMIN — Medication 2 MILLIGRAM(S): at 14:27

## 2024-07-04 RX ADMIN — Medication 1 APPLICATION(S): at 11:40

## 2024-07-04 RX ADMIN — POLYETHYLENE GLYCOL 3350 17 GRAM(S): 1 POWDER ORAL at 17:36

## 2024-07-04 RX ADMIN — Medication 1 MILLIGRAM(S): at 01:39

## 2024-07-04 NOTE — CONSULT NOTE ADULT - SUBJECTIVE AND OBJECTIVE BOX
Gastroenterology Consultation:    Patient is a 58y old  Male who presents with a chief complaint of abdominal pain (04 Jul 2024 01:21)        Admitted on: 07-03-24      HPI:  A 58 yr old male with Down's Syndrome and Dementia, no other pmh, presented to the ED with his mother because of abdominal pain and vomiting. Per mother, pt is nonverbal but he has been holding his abdomen for the past few weeks, and he recently lost 20lbs in the past four months despite having good appetite. According to the mother, pt also had NBNB vomiting yesterday.  Of note pt was scheduled for EGD/colono as OP but could not be done because pt could not drink the prep      Prior EGD:    Prior Colonoscopy:      PAST MEDICAL & SURGICAL HISTORY:  Dementia      Down's syndrome      History of amblyopia            FAMILY HISTORY:  NO GI related malignancies/disorders/IBD    Social History:  Tobacco: denies  Alcohol:denies  Drugs: denies    Home Medications:  Senokot 8.6 mg oral tablet: 2 tab(s) orally once a day (at bedtime) (04 Jul 2024 02:15)        MEDICATIONS  (STANDING):  chlorhexidine 2% Cloths 1 Application(s) Topical daily  enoxaparin Injectable 40 milliGRAM(s) SubCutaneous every 24 hours  polyethylene glycol 3350 17 Gram(s) Oral every 12 hours  senna 2 Tablet(s) Oral at bedtime    MEDICATIONS  (PRN):  LORazepam   Injectable 2 milliGRAM(s) IV Push Once PRN MR ABDOMEN      Allergies  penicillin (Rash)      Review of Systems:   unable to assess           Physical Examination:  T(C): 36.3 (07-04-24 @ 12:00), Max: 36.9 (07-03-24 @ 15:31)  HR: 66 (07-04-24 @ 12:00) (51 - 83)  BP: 134/78 (07-04-24 @ 12:00) (112/55 - 140/77)  RR: 20 (07-04-24 @ 12:00) (15 - 20)  SpO2: 99% (07-04-24 @ 12:00) (98% - 100%)          GENERAL: AAOx3, no acute distress.  HEAD:  Atraumatic, Normocephalic  EYES: conjunctiva and sclera clear  CHEST/LUNG: Clear to auscultation bilaterally; No wheeze, rhonchi, or rales  HEART: Regular rate and rhythm; normal S1, S2, No murmurs.  ABDOMEN: Soft, nontender, nondistended; Bowel sounds present  SKIN: Intact, no jaundice        Data:                        14.5   4.83  )-----------( 213      ( 04 Jul 2024 11:00 )             43.3     Hgb Trend:  14.5  07-04-24 @ 11:00  14.3  07-03-24 @ 12:05      07-04    144  |  106  |  13  ----------------------------<  106<H>  4.3   |  28  |  0.8    Ca    8.9      04 Jul 2024 11:00  Phos  3.5     07-04  Mg     1.8     07-04    TPro  6.3  /  Alb  3.7  /  TBili  0.5  /  DBili  x   /  AST  27  /  ALT  10  /  AlkPhos  75  07-04    Liver panel trend:  TBili 0.5   /   AST 27   /   ALT 10   /   AlkP 75   /   Tptn 6.3   /   Alb 3.7    /   DBili --      07-04  TBili 0.6   /   AST 31   /   ALT 9   /   AlkP 78   /   Tptn 7.0   /   Alb 3.9    /   DBili --      07-03              Radiology:  CT Abdomen and Pelvis w/ Oral Cont and w/ IV Cont:   ACC: [id number] EXAM:  CT ABDOMEN AND PELVIS OC IC   ORDERED BY: CHICHI DUARTE     PROCEDURE DATE:  07/03/2024          INTERPRETATION:  CLINICAL HISTORY / REASON FOR EXAM: Right lower quadrant   pain.    TECHNIQUE: Contiguous axial CT images were obtained from the lower chest   to the pubic symphysis following administration of 95 mL Omnipaque 350   intravenous contrast, 5 mL discarded. Oral contrast was not administered.   Reformatted images in the coronal and sagittal planes were acquired.    COMPARISON CT: CT abdomen and pelvis from October 5, 2023    OTHER STUDIES USED FOR CORRELATION: None.      FINDINGS:    LOWER CHEST: Bilateral lower lobe mosaic groundglass attenuation.    HEPATOBILIARY: Left hepatic lobe cyst, stable.    SPLEEN: Unremarkable.    PANCREAS: Unremarkable.    ADRENAL GLANDS: Unremarkable.    KIDNEYS: Symmetric enhancement bilaterally. No evidence of hydronephrosis.    ABDOMINOPELVIC NODES: Unremarkable.    PELVIC ORGANS: Unremarkable.    PERITONEUM/MESENTERY/BOWEL: Mesenteric lesion adjacent to the small bowel   measuring up to 2.9 x 2.7 cm (series 5, image 121). Oral contrast reaches   the cecum. No bowel obstruction, ascites or intraperitoneal free air. The   appendix is not definitively visualized. No pericecal inflammatory   changes.    BONES/SOFT TISSUES: No acute osseous abnormality.    VASCULAR: Aorta is normal in caliber.      IMPRESSION:    Lesion within the mesentery abutting the small bowel measuring   approximately 2.9 x 2.7 cm which is indeterminate. Consider further   characterization with MRI as clinically indicated.    --- End of Report ---            TAVIA GASPAR MD; Attending Radiologist  This document has been electronically signed. Jul  3 2024  6:42PM (07-03-24 @ 16:42)       Gastroenterology Consultation:    Patient is a 58y old  Male who presents with a chief complaint of abdominal pain (04 Jul 2024 01:21)        Admitted on: 07-03-24      HPI:  58 yr old male with Down's Syndrome and Dementia at average risk for CRC went to the ED on 10/5/23 for chronic RLQ pain. According to his mother, he is nonverbal but he has been holding his RLQ for the past year. She also stated that he has lost about 25 lbs in the past year. She stated that he has a good appetite and eats well. He has a long H/O constipation; he takes Senokot q day and Miralax without BMs q day. His last BM was 2 days ago. His mother stated he has not had any heartburn, blood in the stool, melena, vomiting, dysphagia. Patient was seen in clinic and was scheduled for EGD and colonoscopy but could not be done as he could not drink the prep.  CT scan of abdomen Lesion within the mesentery abutting the small bowel measuring approximately 2.9 x 2.7 cm which is indeterminate. no obstruction.       PAST MEDICAL & SURGICAL HISTORY:  Dementia      Down's syndrome      History of amblyopia            FAMILY HISTORY:  NO GI related malignancies/disorders/IBD    Social History:  Tobacco: denies  Alcohol:denies  Drugs: denies    Home Medications:  Senokot 8.6 mg oral tablet: 2 tab(s) orally once a day (at bedtime) (04 Jul 2024 02:15)        MEDICATIONS  (STANDING):  chlorhexidine 2% Cloths 1 Application(s) Topical daily  enoxaparin Injectable 40 milliGRAM(s) SubCutaneous every 24 hours  polyethylene glycol 3350 17 Gram(s) Oral every 12 hours  senna 2 Tablet(s) Oral at bedtime    MEDICATIONS  (PRN):  LORazepam   Injectable 2 milliGRAM(s) IV Push Once PRN MR ABDOMEN      Allergies  penicillin (Rash)      Review of Systems:   unable to assess           Physical Examination:  T(C): 36.3 (07-04-24 @ 12:00), Max: 36.9 (07-03-24 @ 15:31)  HR: 66 (07-04-24 @ 12:00) (51 - 83)  BP: 134/78 (07-04-24 @ 12:00) (112/55 - 140/77)  RR: 20 (07-04-24 @ 12:00) (15 - 20)  SpO2: 99% (07-04-24 @ 12:00) (98% - 100%)          GENERAL: AAOx3, no acute distress.  HEAD:  Atraumatic, Normocephalic  EYES: conjunctiva and sclera clear  CHEST/LUNG: Clear to auscultation bilaterally; No wheeze, rhonchi, or rales  HEART: Regular rate and rhythm; normal S1, S2, No murmurs.  ABDOMEN: Soft, nontender, nondistended; Bowel sounds present  SKIN: Intact, no jaundice        Data:                        14.5   4.83  )-----------( 213      ( 04 Jul 2024 11:00 )             43.3     Hgb Trend:  14.5  07-04-24 @ 11:00  14.3  07-03-24 @ 12:05      07-04    144  |  106  |  13  ----------------------------<  106<H>  4.3   |  28  |  0.8    Ca    8.9      04 Jul 2024 11:00  Phos  3.5     07-04  Mg     1.8     07-04    TPro  6.3  /  Alb  3.7  /  TBili  0.5  /  DBili  x   /  AST  27  /  ALT  10  /  AlkPhos  75  07-04    Liver panel trend:  TBili 0.5   /   AST 27   /   ALT 10   /   AlkP 75   /   Tptn 6.3   /   Alb 3.7    /   DBili --      07-04  TBili 0.6   /   AST 31   /   ALT 9   /   AlkP 78   /   Tptn 7.0   /   Alb 3.9    /   DBili --      07-03              Radiology:  CT Abdomen and Pelvis w/ Oral Cont and w/ IV Cont:   ACC: [id number] EXAM:  CT ABDOMEN AND PELVIS OC IC   ORDERED BY: CHICHI DUARTE     PROCEDURE DATE:  07/03/2024          INTERPRETATION:  CLINICAL HISTORY / REASON FOR EXAM: Right lower quadrant   pain.    TECHNIQUE: Contiguous axial CT images were obtained from the lower chest   to the pubic symphysis following administration of 95 mL Omnipaque 350   intravenous contrast, 5 mL discarded. Oral contrast was not administered.   Reformatted images in the coronal and sagittal planes were acquired.    COMPARISON CT: CT abdomen and pelvis from October 5, 2023    OTHER STUDIES USED FOR CORRELATION: None.      FINDINGS:    LOWER CHEST: Bilateral lower lobe mosaic groundglass attenuation.    HEPATOBILIARY: Left hepatic lobe cyst, stable.    SPLEEN: Unremarkable.    PANCREAS: Unremarkable.    ADRENAL GLANDS: Unremarkable.    KIDNEYS: Symmetric enhancement bilaterally. No evidence of hydronephrosis.    ABDOMINOPELVIC NODES: Unremarkable.    PELVIC ORGANS: Unremarkable.    PERITONEUM/MESENTERY/BOWEL: Mesenteric lesion adjacent to the small bowel   measuring up to 2.9 x 2.7 cm (series 5, image 121). Oral contrast reaches   the cecum. No bowel obstruction, ascites or intraperitoneal free air. The   appendix is not definitively visualized. No pericecal inflammatory   changes.    BONES/SOFT TISSUES: No acute osseous abnormality.    VASCULAR: Aorta is normal in caliber.      IMPRESSION:    Lesion within the mesentery abutting the small bowel measuring   approximately 2.9 x 2.7 cm which is indeterminate. Consider further   characterization with MRI as clinically indicated.    --- End of Report ---            TAVIA GASPAR MD; Attending Radiologist  This document has been electronically signed. Jul  3 2024  6:42PM (07-03-24 @ 16:42)

## 2024-07-04 NOTE — H&P ADULT - NSHPPHYSICALEXAM_GEN_ALL_CORE
GENERAL: NAD, lying in bed comfortably  HEAD:  Atraumatic, normocephalic  EYES: EOMI, PERRLA, conjunctiva and sclera clear  NECK: Supple, trachea midline, no JVD  HEART: Regular rate and rhythm, no murmurs, rubs, or gallops  LUNGS: Unlabored respirations.  Clear to auscultation bilaterally, no crackles, wheezing, or rhonchi  ABDOMEN: Soft, nontender, nondistended, +BS  EXTREMITIES: 2+ peripheral pulses bilaterally. No clubbing, cyanosis, or edema  NERVOUS SYSTEM:  Nonverbal, moving all extremities, no focal deficits   SKIN: No rashes or lesions

## 2024-07-04 NOTE — CONSULT NOTE ADULT - ASSESSMENT
58 yr old male with Down's Syndrome and Dementia at average risk for CRC went to the ED on 10/5/23 for chronic RLQ pain. According to his mother, he is nonverbal but he has been holding his RLQ for the past year. She also stated that he has lost about 25 lbs in the past year. She stated that he has a good appetite and eats well. He has a long H/O constipation; he takes Senokot q day and Miralax without BMs q day. His last BM was 2 days ago. His mother stated he has not had any heartburn, blood in the stool, melena, vomiting, dysphagia. Patient was seen in clinic and was scheduled for EGD and colonoscopy but could not be done as he could not drink the prep.  CT scan of abdomen Lesion within the mesentery abutting the small bowel measuring approximately 2.9 x 2.7 cm which is indeterminate. no obstruction.     Unintentional Weight loss/chronic RLQ Abdominal pain/nausea,vomiting  Chronic constipation  - lipase, lactate : normal  - CTAP as above  - primary team ordered MRI  - phy exam benign  - no family hx of GI related malignancies, not on AC    RECS  - will recommend EGD/Colonoscopy once able to prep , might need 2 day prep atleast  - Start Miralax TID, senna 2 tabs night  - Monitor BMs  - Recommend ambulation, frequent repositioning  - Keep on clear liquid diet  - Will follow

## 2024-07-04 NOTE — H&P ADULT - NSHPLABSRESULTS_GEN_ALL_CORE
.  LABS:                         14.3   6.62  )-----------( 222      ( 03 Jul 2024 12:05 )             42.6     07-03    138  |  105  |  17  ----------------------------<  107<H>  tnp   |  21  |  0.9    Ca    9.0      03 Jul 2024 12:05  Mg     2.0     07-03    TPro  7.0  /  Alb  3.9  /  TBili  0.6  /  DBili  x   /  AST  31  /  ALT  9   /  AlkPhos  78  07-03      Urinalysis Basic - ( 03 Jul 2024 12:05 )    Color: x / Appearance: x / SG: x / pH: x  Gluc: 107 mg/dL / Ketone: x  / Bili: x / Urobili: x   Blood: x / Protein: x / Nitrite: x   Leuk Esterase: x / RBC: x / WBC x   Sq Epi: x / Non Sq Epi: x / Bacteria: x            RADIOLOGY, EKG & ADDITIONAL TESTS: Reviewed.

## 2024-07-04 NOTE — H&P ADULT - ASSESSMENT
A 58 yr old male with Down's Syndrome and Dementia, no other pmh, presented to the ED with his mother because of abdominal pain and vomiting. Per mother, pt is nonverbal but he has been holding his abdomen for the past few weeks, and he recently lost 20lbs in the past four months despite having good appetite. According to the mother, pt also started having NBNB vomiting. Patient was scheduled for EGD/colono as OP but could not be done because pt could not drink the prep. Admitted for further investigation and management.    #Abdominal pain  #NBNB vomiting  #Weight loss  - Pt nonverbal, but he has been holding his abdomen for te past few weeks  - 20lbs of weght los over 4 months  - was scheduled for EGD/Colono OP, but could not be donne as pt unable to drink the prep.  - On exam, abdomen soft, nondistended, + BS, passing gas  - No vomiting today, last BM yesterday, no blood in the stool  - CT A/P with oral and IV contrast: Lesion within the mesentery abutting the small bowel measuring approximately 2.9 x 2.7 cm which is indeterminate. Otherwise unremarkable.  No bowel obstruction.  - Previous CT A/P on 10/5/23 was negative.  - Clear liquid diet  - start miralax bid and senna  - f/u MR abdomen. Needs sedation prior to MRI  - GI consult for possible inpatient EGD/Colono as hard to do it as OP    #chronic constipation  - abdomen soft, nondistended, + BS, passing gas  - CT A/P with oral and IV contrast: No bowel obstruction.  - Last BM yesterday per mother, no blood in stools  - start miralax bid and senna    DVT ppx: Lovenox  Diet: clear liquid  Acitivty: AAT   A 58 yr old male with Down's Syndrome and Dementia, no other pmh, presented to the ED with his mother because of abdominal pain and vomiting. Per mother, pt is nonverbal but he has been holding his abdomen for the past few weeks, and he recently lost 20lbs in the past four months despite having good appetite. According to the mother, pt also started having NBNB vomiting. Patient was scheduled for EGD/colono as OP but could not be done because pt could not drink the prep. Admitted for further investigation and management.    #Abdominal pain  #NBNB vomiting  #Weight loss  - Pt nonverbal, but he has been holding his abdomen for te past few weeks  - 20lbs of weight loss over 4 months  - was scheduled for EGD/Colono OP, but could not be donne as pt unable to drink the prep.  - On exam, abdomen soft, nondistended, + BS, passing gas  - No vomiting today, last BM yesterday, no blood in the stool  - CT A/P with oral and IV contrast: Lesion within the mesentery abutting the small bowel measuring approximately 2.9 x 2.7 cm which is indeterminate. Otherwise unremarkable.  No bowel obstruction.  - Previous CT A/P on 10/5/23 was negative.  - Clear liquid diet  - start miralax bid and senna  - f/u MR abdomen. Needs sedation prior to MRI  - GI consult for possible inpatient EGD/Colono as hard to do it as OP    #chronic constipation  - abdomen soft, nondistended, + BS, passing gas  - CT A/P with oral and IV contrast: No bowel obstruction.  - Last BM yesterday per mother, no blood in stools  - start miralax bid and senna    DVT ppx: Lovenox  Diet: clear liquid  Acitivty: AAT   A 58 yr old male with Down's Syndrome and Dementia, no other pmh, presented to the ED with his mother because of abdominal pain and vomiting. Per mother, pt is nonverbal but he has been holding his abdomen for the past few weeks, and he recently lost 20lbs in the past four months despite having good appetite. According to the mother, pt also started having NBNB vomiting. Patient was scheduled for EGD/colono as OP but could not be done because pt could not drink the prep. Admitted for further investigation and management.    #Abdominal pain  #NBNB vomiting  #Weight loss  - Pt nonverbal, but he has been holding his abdomen for te past few weeks  - 20lbs of weight loss over 4 months  - was scheduled for EGD/Colono OP, but could not be donne as pt unable to drink the prep.  - On exam, abdomen soft, nondistended, + BS, passing gas  - No vomiting today, supportive treatment  - last BM yesterday, no blood in the stool  - CT A/P with oral and IV contrast: Lesion within the mesentery abutting the small bowel measuring approximately 2.9 x 2.7 cm which is indeterminate. Otherwise unremarkable.  No bowel obstruction.  - Previous CT A/P on 10/5/23 was negative.  - Clear liquid diet  - start miralax bid and senna  - f/u MR abdomen. Needs sedation prior to MRI  - GI consult for possible inpatient EGD/Colono as hard to do it as OP    #chronic constipation  - abdomen soft, nondistended, + BS, passing gas  - CT A/P with oral and IV contrast: No bowel obstruction.  - Last BM yesterday per mother, no blood in stools  - start miralax bid and senna    DVT ppx: Lovenox  Diet: clear liquid  Acitivty: AAT   A 58 yr old male with Down's Syndrome and Dementia, no other pmh, presented to the ED with his mother because of abdominal pain and vomiting. Per mother, pt is nonverbal but he has been holding his abdomen for the past few weeks, and he recently lost 20lbs in the past four months despite having good appetite. According to the mother, pt also started having NBNB vomiting. Patient was scheduled for EGD/colono as OP but could not be done because pt could not drink the prep. Admitted for further investigation and management.    #Abdominal pain  #NBNB vomiting  #Weight loss  - Pt nonverbal, but he has been holding his abdomen for te past few weeks  - 20lbs of weight loss over 4 months  - was scheduled for EGD/Colono OP, but could not be donne as pt unable to drink the prep.  - On exam, abdomen soft, nondistended, + BS, passing gas  - no blood in the stool  - CT A/P with oral and IV contrast: Lesion within the mesentery abutting the small bowel measuring approximately 2.9 x 2.7 cm which is indeterminate. Otherwise unremarkable.  No bowel obstruction.  - Previous CT A/P on 10/5/23 was negative.  - Clear liquid diet  - start miralax bid and senna  - f/u MR abdomen. Needs sedation prior to MRI  - GI consult for possible inpatient EGD/Colono as hard to do it as OP    #chronic constipation  - abdomen soft, nondistended, + BS, passing gas  - CT A/P with oral and IV contrast: No bowel obstruction.  - Last BM yesterday per mother, no blood in stools  - start miralax bid and senna    DVT ppx: Lovenox  Diet: clear liquid  Acitivty: AAT

## 2024-07-04 NOTE — H&P ADULT - ATTENDING COMMENTS
A 58 yr old male with Down's Syndrome and Dementia, no other pmh, presented to the ED with his mother because of abdominal pain and vomiting. Per mother, pt is nonverbal but he has been holding his abdomen for the past few weeks, and he recently lost 20lbs in the past four months despite having good appetite. According to the mother, pt also had NBNB vomiting yesterday.  Of note pt was scheduled for EGD/colono as OP but could not be done because pt could not drink the prep.    In the ED, VS stable, labs unremarkable.  CT A/P showed a lesion within the mesentery abutting the small bowel measuring approximately 2.9 x 2.7 cm which is indeterminate. Otherwise unremarkable.    Patient admitted for further management.    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  Will follow.

## 2024-07-04 NOTE — PROGRESS NOTE ADULT - ASSESSMENT
A 58 yr old male with Down's Syndrome and Dementia, no other pmh, presented to the ED with his mother because of abdominal pain and vomiting. Per mother, pt is nonverbal but he has been holding his abdomen for the past few weeks, and he recently lost 20lbs in the past four months despite having good appetite. According to the mother, pt also started having NBNB vomiting. Patient was scheduled for EGD/colono as OP but could not be done because pt could not drink the prep. Admitted for further investigation and management.    #Abdominal pain  #NBNB vomiting  #Weight loss  #chronic constipation  - Pt nonverbal, but he has been holding his abdomen for te past few weeks  - 20lbs of weight loss over 4 months  - Scheduled for EGD/Colono OP, but could not be donne as pt unable to drink the prep.  - Initial exam, abdomen soft, nondistended, + BS, passing gas  - 7/4 CT A/P with oral and IV contrast: Lesion within the mesentery abutting the small bowel measuring approximately 2.9 x 2.7 cm which is indeterminate. Otherwise unremarkable.  No bowel obstruction.  - Previous CT A/P on 10/5/23 was negative.  - Clear liquid diet  - On miralax and senna  - No vomiting today, supportive treatment  - last BM yesterday, no blood in the stool  - f/u MR abdomen. Needs sedation prior to MRI  - GI consult for possible inpatient EGD/Colono as hard to do it as OP  - f/u CBC, BMP, Phos, Mg  - Clear liquid diet    DVT ppx: Lovenox  Diet: clear liquid  Acitivty: AAT     A 58 yr old male with Down's Syndrome and Dementia, no other pmh, presented to the ED with his mother because of abdominal pain and vomiting. Per mother, pt is nonverbal but he has been holding his abdomen for the past few weeks, and he recently lost 20lbs in the past four months despite having good appetite. According to the mother, pt also started having NBNB vomiting. Patient was scheduled for EGD/colono as OP but could not be done because pt could not drink the prep. Admitted for further investigation and management.    #Abdominal pain  #NBNB vomiting  #Weight loss  #chronic constipation  - Pt nonverbal, but he has been holding his abdomen for the past few weeks  - 20lbs of weight loss over 4 months  - Scheduled for EGD/Colono OP, but could not be donne as pt unable to drink the prep.  - Initial exam, abdomen soft, nondistended, + BS, passing gas  - 7/4 CT A/P with oral and IV contrast: Lesion within the mesentery abutting the small bowel measuring approximately 2.9 x 2.7 cm which is indeterminate. Otherwise unremarkable.  No bowel obstruction.  - Previous CT A/P on 10/5/23 was negative.  - Clear liquid diet  - On miralax and senna  - No vomiting today, supportive treatment  - last BM yesterday, no blood in the stool  - f/u MR abdomen. Needs sedation prior to MRI  - GI consult for possible inpatient EGD/Colono as hard to do it as OP  - f/u CBC, BMP, Phos, Mg  - Clear liquid diet    DVT ppx: Lovenox  Diet: clear liquid  Acitivty: AAT

## 2024-07-04 NOTE — H&P ADULT - HISTORY OF PRESENT ILLNESS
A 58y Male, with Down syndrome, no other pmh, presented to the ED with his mother because of abdominal pain and vomiting. Per mother, pt has been pointing to his abdomen the last week, and yesterday patent started vomiting which brought him to the ED.     ativan   gi cslt    egd colono inpatient A 58 yr old male with Down's Syndrome and Dementia, no other pmh, presented to the ED with his mother because of abdominal pain and vomiting. Per mother, pt is nonverbal but he has been holding his abdomen for the past few weeks, and he recently lost 20lbs in the past four months despite having good appetite. According to the mother, pt also had NBNB vomiting yesterday.  Of note pt was scheduled for EGD/colono as OP but could not be done because pt could not drink the prep.    In the ED, VS stable, labs unremarkable.  CT A/P showed a lesion within the mesentery abutting the small bowel measuring approximately 2.9 x 2.7 cm which is indeterminate. Otherwise unremarkable.    Patient admitted for further management.

## 2024-07-04 NOTE — PROGRESS NOTE ADULT - SUBJECTIVE AND OBJECTIVE BOX
Patient is a 58y old  Male who presents with a chief complaint of abdominal pain (04 Jul 2024 13:12)    HPI:  A 58 yr old male with Down's Syndrome and Dementia, no other pmh, presented to the ED with his mother because of abdominal pain and vomiting. Per mother, pt is nonverbal but he has been holding his abdomen for the past few weeks, and he recently lost 20lbs in the past four months despite having good appetite. According to the mother, pt also had NBNB vomiting yesterday. Of note pt was scheduled for EGD/colono as OP but could not be done because pt could not drink the prep.    In the ED, VS stable, labs unremarkable.  CT A/P showed a lesion within the mesentery abutting the small bowel measuring approximately 2.9 x 2.7 cm which is indeterminate. Otherwise unremarkable.    Patient admitted for further management.   (04 Jul 2024 01:21)       Today is hospital day 1d. This morning patient was seen and examined at bedside, resting comfortably in bed.    No acute or major events overnight.    PAST MEDICAL & SURGICAL HISTORY  Dementia    Down's syndrome    History of amblyopia        SOCIAL HISTORY:  Social History:      Otherwise, as noted in HPI    ALLERGIES:  penicillin (Rash)      MEDICATIONS:  STANDING MEDICATIONS  chlorhexidine 2% Cloths 1 Application(s) Topical daily  enoxaparin Injectable 40 milliGRAM(s) SubCutaneous every 24 hours  polyethylene glycol 3350 17 Gram(s) Oral every 12 hours  senna 2 Tablet(s) Oral at bedtime    PRN MEDICATIONS      VITALS:   T(C): 36.3 (07-04-24 @ 12:00), Max: 36.9 (07-03-24 @ 15:31)  HR: 66 (07-04-24 @ 12:00) (51 - 83)  BP: 134/78 (07-04-24 @ 12:00) (112/55 - 140/77)  RR: 20 (07-04-24 @ 12:00) (15 - 20)  SpO2: 99% (07-04-24 @ 12:00) (98% - 100%)  I&O's Summary        PHYSICAL EXAM:  GENERAL: Nondistressed  HEAD:  Atraumatic, Normocephalic  EYES: EOMI, PERRLA, conjunctiva and sclera clear  NECK: Supple, No JVD  LUNG: B/L good air entry; No rales, rhonchi, or wheezing  HEART: S1 S2 normal  ABDOMEN: Soft, Nontender, Nondistended; Bowel sounds present  EXTREMITIES:  2+ Peripheral Pulses, No edema      LABS:                        14.5   4.83  )-----------( 213      ( 04 Jul 2024 11:00 )             43.3     07-04    144  |  106  |  13  ----------------------------<  106<H>  4.3   |  28  |  0.8    Ca    8.9      04 Jul 2024 11:00  Phos  3.5     07-04  Mg     1.8     07-04    TPro  6.3  /  Alb  3.7  /  TBili  0.5  /  DBili  x   /  AST  27  /  ALT  10  /  AlkPhos  75  07-04      Urinalysis Basic - ( 04 Jul 2024 11:00 )    ABG - ( 04 Jul 2024 02:05 )  pH, Arterial: 7.45  pH, Blood: x     /  pCO2: 38    /  pO2: 125   / HCO3: 26    / Base Excess: 2.4   /  SaO2: 99.6

## 2024-07-05 LAB
ALBUMIN SERPL ELPH-MCNC: 3.8 G/DL — SIGNIFICANT CHANGE UP (ref 3.5–5.2)
ALP SERPL-CCNC: 78 U/L — SIGNIFICANT CHANGE UP (ref 30–115)
ALT FLD-CCNC: 11 U/L — SIGNIFICANT CHANGE UP (ref 0–41)
ANION GAP SERPL CALC-SCNC: 15 MMOL/L — HIGH (ref 7–14)
AST SERPL-CCNC: 31 U/L — SIGNIFICANT CHANGE UP (ref 0–41)
BASOPHILS # BLD AUTO: 0.03 K/UL — SIGNIFICANT CHANGE UP (ref 0–0.2)
BASOPHILS NFR BLD AUTO: 0.6 % — SIGNIFICANT CHANGE UP (ref 0–1)
BILIRUB SERPL-MCNC: 0.5 MG/DL — SIGNIFICANT CHANGE UP (ref 0.2–1.2)
BUN SERPL-MCNC: 9 MG/DL — LOW (ref 10–20)
CALCIUM SERPL-MCNC: 8.8 MG/DL — SIGNIFICANT CHANGE UP (ref 8.4–10.5)
CHLORIDE SERPL-SCNC: 106 MMOL/L — SIGNIFICANT CHANGE UP (ref 98–110)
CO2 SERPL-SCNC: 22 MMOL/L — SIGNIFICANT CHANGE UP (ref 17–32)
CREAT SERPL-MCNC: 0.7 MG/DL — SIGNIFICANT CHANGE UP (ref 0.7–1.5)
EGFR: 108 ML/MIN/1.73M2 — SIGNIFICANT CHANGE UP
EOSINOPHIL # BLD AUTO: 0.16 K/UL — SIGNIFICANT CHANGE UP (ref 0–0.7)
EOSINOPHIL NFR BLD AUTO: 3.1 % — SIGNIFICANT CHANGE UP (ref 0–8)
GLUCOSE SERPL-MCNC: 92 MG/DL — SIGNIFICANT CHANGE UP (ref 70–99)
HCT VFR BLD CALC: 42 % — SIGNIFICANT CHANGE UP (ref 42–52)
HGB BLD-MCNC: 14.5 G/DL — SIGNIFICANT CHANGE UP (ref 14–18)
IMM GRANULOCYTES NFR BLD AUTO: 0.2 % — SIGNIFICANT CHANGE UP (ref 0.1–0.3)
LACTATE SERPL-SCNC: 1.3 MMOL/L — SIGNIFICANT CHANGE UP (ref 0.7–2)
LYMPHOCYTES # BLD AUTO: 1.53 K/UL — SIGNIFICANT CHANGE UP (ref 1.2–3.4)
LYMPHOCYTES # BLD AUTO: 29.4 % — SIGNIFICANT CHANGE UP (ref 20.5–51.1)
MAGNESIUM SERPL-MCNC: 1.9 MG/DL — SIGNIFICANT CHANGE UP (ref 1.8–2.4)
MCHC RBC-ENTMCNC: 32.3 PG — HIGH (ref 27–31)
MCHC RBC-ENTMCNC: 34.5 G/DL — SIGNIFICANT CHANGE UP (ref 32–37)
MCV RBC AUTO: 93.5 FL — SIGNIFICANT CHANGE UP (ref 80–94)
MONOCYTES # BLD AUTO: 0.38 K/UL — SIGNIFICANT CHANGE UP (ref 0.1–0.6)
MONOCYTES NFR BLD AUTO: 7.3 % — SIGNIFICANT CHANGE UP (ref 1.7–9.3)
NEUTROPHILS # BLD AUTO: 3.1 K/UL — SIGNIFICANT CHANGE UP (ref 1.4–6.5)
NEUTROPHILS NFR BLD AUTO: 59.4 % — SIGNIFICANT CHANGE UP (ref 42.2–75.2)
NRBC # BLD: 0 /100 WBCS — SIGNIFICANT CHANGE UP (ref 0–0)
PHOSPHATE SERPL-MCNC: 3.4 MG/DL — SIGNIFICANT CHANGE UP (ref 2.1–4.9)
PLATELET # BLD AUTO: 226 K/UL — SIGNIFICANT CHANGE UP (ref 130–400)
PMV BLD: 9.9 FL — SIGNIFICANT CHANGE UP (ref 7.4–10.4)
POTASSIUM SERPL-MCNC: 4 MMOL/L — SIGNIFICANT CHANGE UP (ref 3.5–5)
POTASSIUM SERPL-SCNC: 4 MMOL/L — SIGNIFICANT CHANGE UP (ref 3.5–5)
PROT SERPL-MCNC: 6.9 G/DL — SIGNIFICANT CHANGE UP (ref 6–8)
RBC # BLD: 4.49 M/UL — LOW (ref 4.7–6.1)
RBC # FLD: 13.2 % — SIGNIFICANT CHANGE UP (ref 11.5–14.5)
SODIUM SERPL-SCNC: 143 MMOL/L — SIGNIFICANT CHANGE UP (ref 135–146)
WBC # BLD: 5.21 K/UL — SIGNIFICANT CHANGE UP (ref 4.8–10.8)
WBC # FLD AUTO: 5.21 K/UL — SIGNIFICANT CHANGE UP (ref 4.8–10.8)

## 2024-07-05 PROCEDURE — 99233 SBSQ HOSP IP/OBS HIGH 50: CPT

## 2024-07-05 PROCEDURE — 99232 SBSQ HOSP IP/OBS MODERATE 35: CPT

## 2024-07-05 RX ORDER — LORAZEPAM 0.5 MG
1 TABLET ORAL ONCE
Refills: 0 | Status: DISCONTINUED | OUTPATIENT
Start: 2024-07-05 | End: 2024-07-05

## 2024-07-05 RX ORDER — HALOPERIDOL DECANOATE 100 MG/ML
2.5 VIAL (ML) INTRAMUSCULAR ONCE
Refills: 0 | Status: COMPLETED | OUTPATIENT
Start: 2024-07-05 | End: 2024-07-05

## 2024-07-05 RX ADMIN — Medication 2 TABLET(S): at 23:54

## 2024-07-05 RX ADMIN — Medication 2.5 MILLIGRAM(S): at 11:39

## 2024-07-05 RX ADMIN — POLYETHYLENE GLYCOL 3350 17 GRAM(S): 1 POWDER ORAL at 17:11

## 2024-07-05 RX ADMIN — Medication 1 APPLICATION(S): at 11:43

## 2024-07-05 RX ADMIN — Medication 1 MILLIGRAM(S): at 10:48

## 2024-07-05 RX ADMIN — ENOXAPARIN SODIUM 40 MILLIGRAM(S): 100 INJECTION SUBCUTANEOUS at 11:39

## 2024-07-05 RX ADMIN — POLYETHYLENE GLYCOL 3350 17 GRAM(S): 1 POWDER ORAL at 05:14

## 2024-07-05 NOTE — DIETITIAN INITIAL EVALUATION ADULT - PERTINENT LABORATORY DATA
07-04    144  |  106  |  13  ----------------------------<  106<H>  4.3   |  28  |  0.8    Ca    8.9      04 Jul 2024 11:00  Phos  3.5     07-04  Mg     1.8     07-04    TPro  6.3  /  Alb  3.7  /  TBili  0.5  /  DBili  x   /  AST  27  /  ALT  10  /  AlkPhos  75  07-04

## 2024-07-05 NOTE — DIETITIAN INITIAL EVALUATION ADULT - OTHER INFO
--58y old  Male who presents with a chief complaint of abdominal pain  #Abdominal pain  #NBNB vomiting  #Weight loss  #chronic constipation  - Pt nonverbal, but he has been holding his abdomen for the past few weeks  /4 CT A/P with oral and IV contrast: Lesion within the mesentery abutting the small bowel measuring approximately 2.9 x 2.7 cm which is indeterminate. Otherwise unremarkable.  No bowel obstruction.  - will recommend EGD/Colonoscopy early next week  - once able to prep , might need 2 day prep atleast  - Start Miralax TID, senna 2 tabs

## 2024-07-05 NOTE — PROGRESS NOTE ADULT - SUBJECTIVE AND OBJECTIVE BOX
Patient is a 58y old  Male who presents with a chief complaint of abdominal pain.    HPI:  A 58 yr old male with Down's Syndrome and Dementia, no other pmh, presented to the ED with his mother because of abdominal pain and vomiting. Per mother, pt is nonverbal but he has been holding his abdomen for the past few weeks, and he recently lost 20lbs in the past four months despite having good appetite. According to the mother, pt also had NBNB vomiting yesterday.  Of note pt was scheduled for EGD/colono as OP but could not be done because pt could not drink the prep.    In the ED, VS stable, labs unremarkable.  CT A/P showed a lesion within the mesentery abutting the small bowel measuring approximately 2.9 x 2.7 cm which is indeterminate. Otherwise unremarkable.    Patient admitted for further management.       Today is hospital day 2d. This morning patient was seen and examined at bedside, resting comfortably in bed.    No acute or major events overnight.    PAST MEDICAL & SURGICAL HISTORY  Dementia    Down's syndrome    History of amblyopia        SOCIAL HISTORY:  Social History:      Otherwise, as noted in HPI    ALLERGIES:  penicillin (Rash)      MEDICATIONS:  STANDING MEDICATIONS  chlorhexidine 2% Cloths 1 Application(s) Topical daily  enoxaparin Injectable 40 milliGRAM(s) SubCutaneous every 24 hours  polyethylene glycol 3350 17 Gram(s) Oral every 12 hours  senna 2 Tablet(s) Oral at bedtime    PRN MEDICATIONS      VITALS:   T(C): 36.5 (07-05-24 @ 13:11), Max: 38.4 (07-04-24 @ 17:56)  HR: 69 (07-05-24 @ 13:11) (59 - 87)  BP: 117/74 (07-05-24 @ 13:11) (100/64 - 127/78)  RR: 18 (07-05-24 @ 13:11) (18 - 18)  SpO2: 99% (07-05-24 @ 13:11) (97% - 100%)  I&O's Summary    05 Jul 2024 07:01  -  05 Jul 2024 17:03  --------------------------------------------------------  IN: 0 mL / OUT: 500 mL / NET: -500 mL        PHYSICAL EXAM:  GENERAL: Awake  HEAD:  Atraumatic, Normocephalic  EYES: EOMI, PERRLA, conjunctiva and sclera clear  NECK: Supple, No JVD  LUNG: B/L good air entry; No rales, rhonchi, or wheezing  HEART: S1 S2 normal  ABDOMEN: Soft, Nontender, Nondistended; Bowel sounds present  EXTREMITIES:  2+ Peripheral Pulses, No edema      LABS:                        14.5   5.21  )-----------( 226      ( 05 Jul 2024 13:16 )             42.0     07-05    143  |  106  |  9<L>  ----------------------------<  92  4.0   |  22  |  0.7    Ca    8.8      05 Jul 2024 13:16  Phos  3.4     07-05  Mg     1.9     07-05    TPro  6.9  /  Alb  3.8  /  TBili  0.5  /  DBili  x   /  AST  31  /  ALT  11  /  AlkPhos  78  07-05      ABG - ( 04 Jul 2024 02:05 )  pH, Arterial: 7.45  pH, Blood: x     /  pCO2: 38    /  pO2: 125   / HCO3: 26    / Base Excess: 2.4   /  SaO2: 99.6              Lactate, Blood: 1.3 mmol/L (07-05-24 @ 13:16)      Culture - Urine (collected 03 Jul 2024 12:00)  Source: Clean Catch Clean Catch (Midstream)  Final Report (04 Jul 2024 15:49):    <10,000 CFU/mL Normal Urogenital Vida           Patient is a 89y M ordered for cefepime 2g IV q12h and vancomycin 1g IV q12h for finger with cellulitis and abscess of the distal phalanx. Discussed with ID Dr. Pruett that blood cx with NGTD and finger cx with MSSA. Recommended to discontinue vancomycin and discussed sensitivities. MD agreed to discontinue current antibiotics and switch to cefazolin. Order entered to reflect change in therapy.

## 2024-07-05 NOTE — PROGRESS NOTE ADULT - ASSESSMENT
A 58 yr old male with Down's Syndrome and Dementia, no other pmh, presented to the ED with his mother because of abdominal pain and vomiting. Per mother, pt is nonverbal but he has been holding his abdomen for the past few weeks, and he recently lost 20lbs in the past four months despite having good appetite. According to the mother, pt also started having NBNB vomiting. Patient was scheduled for EGD/colono as OP but could not be done because pt could not drink the prep. Admitted for further investigation and management.    #Abdominal pain a/w NBNB vomiting  #Weight loss  #chronic constipation  - Pt nonverbal, but he has been holding his abdomen for the past few weeks  - 20lbs of weight loss over 4 months  - Scheduled for EGD/Colono OP, but could not be donne as pt unable to drink the prep.  - 7/4 CT A/P with oral and IV contrast: Lesion within the mesentery abutting the small bowel measuring approximately 2.9 x 2.7 cm which is indeterminate. Otherwise unremarkable.  No bowel obstruction.  - Previous CT A/P on 10/5/23 was negative.  - Clear liquid diet  - On miralax and senna  - f/u MR abdomen. Needs sedation prior to MRI  - GI consult for possible inpatient EGD/Colono possibly early next week.,   - Clear liquid diet    DVT ppx: Lovenox  Diet: clear liquid  Acitivty: AAT    Pending: EGD/[facility]   Dispo: home ?

## 2024-07-05 NOTE — DIETITIAN INITIAL EVALUATION ADULT - ORAL INTAKE PTA/DIET HISTORY
Pt's mother at bedside as pt himself is non-verbal. Reports that he has a good appetite and PO intake at home. Pt eats 2-3 meals daily. Pt has a home health aide that comes in 6x/week and doc visits 1x per month. He takes vitamin C supplements. UBW recently has been around 64kg vs. wt at admit: 63.5kg -- stable. Mother said that he used to be heavier, but has gradually lost wt over the years, but appetite was always intact. She believes it was due to his ongoing stomach issues. NKFA.

## 2024-07-05 NOTE — PROGRESS NOTE ADULT - ASSESSMENT
A 58 yr old male with Down's Syndrome and Dementia, no other pmh, presented to the ED with his mother because of abdominal pain and vomiting. Per mother, pt is nonverbal but he has been holding his abdomen for the past few weeks, and he recently lost 20lbs in the past four months despite having good appetite. According to the mother, pt also started having NBNB vomiting. Patient was scheduled for EGD/colono as OP but could not be done because pt could not drink the prep. Admitted for further investigation and management.    #Abdominal pain  #NBNB vomiting  #Weight loss  #Chronic constipation  - Pt nonverbal, but he has been holding his abdomen for the past few weeks  - 20lbs of weight loss over 4 months  - Scheduled for EGD/Colono OP, but could not be donne as pt unable to drink the prep.  - Initial exam, abdomen soft, nondistended, + BS, passing gas  - 7/4 CT A/P with oral and IV contrast: Lesion within the mesentery abutting the small bowel measuring approximately 2.9 x 2.7 cm which is indeterminate. Otherwise unremarkable.  No bowel obstruction.  - Previous CT A/P on 10/5/23 was negative.  - Clear liquid diet  - On miralax and senna  - No vomiting today, supportive treatment  - last BM yesterday, no blood in the stool  - f/u MR abdomen. Needs sedation prior to MRI  - GI consult - inpatient EGD/Colono  - f/u CBC, BMP  - low fiber diet

## 2024-07-05 NOTE — DIETITIAN INITIAL EVALUATION ADULT - ADD RECOMMEND
1. add Ensure clear 3x/day (720kcal/24g PRO)   2. advance diet per GI reccs; Goal diet: Regular  3. encourage and assist with PO intake

## 2024-07-05 NOTE — DIETITIAN INITIAL EVALUATION ADULT - OTHER CALCULATIONS
Energy: 1348-1618kcal/day (using MSJ 1-1.2AF)   Protein: 64-77g/day (1-1.2g/kg)   Fluid: 1mL/kcal/day

## 2024-07-05 NOTE — DIETITIAN INITIAL EVALUATION ADULT - PERTINENT MEDS FT
MEDICATIONS  (STANDING):  enoxaparin Injectable 40 milliGRAM(s) SubCutaneous every 24 hours  haloperidol    Injectable 2.5 milliGRAM(s) IntraMuscular once  polyethylene glycol 3350 17 Gram(s) Oral every 12 hours  senna 2 Tablet(s) Oral at bedtime

## 2024-07-05 NOTE — PROGRESS NOTE ADULT - ATTENDING COMMENTS
Agree with the above.  Plan for EGD and colonoscopy early next week.  Low fiber diet for now. miralax tid. Clear liquid diet and prep the day before the procedure. For mesenteric lesion, MRI is pending.  We will follow-up on results and consider EUS versus IR guided sampling if indicated.  Rest of recommendations per the note above.

## 2024-07-05 NOTE — DIETITIAN INITIAL EVALUATION ADULT - NUTRITIONGOAL OUTCOME1
Pt to advance to solids and consume and tolerate >75% of meals/snacks and PO supplements in 3-5 days.

## 2024-07-05 NOTE — PROGRESS NOTE ADULT - ASSESSMENT
58 yr old male with Down's Syndrome and Dementia at average risk for CRC went to the ED on 10/5/23 for chronic RLQ pain. According to his mother, he is nonverbal but he has been holding his RLQ for the past year. She also stated that he has lost about 25 lbs in the past year. She stated that he has a good appetite and eats well. He has a long H/O constipation; he takes Senokot q day and Miralax without BMs q day. His last BM was 2 days ago. His mother stated he has not had any heartburn, blood in the stool, melena, vomiting, dysphagia. Patient was seen in clinic and was scheduled for EGD and colonoscopy but could not be done as he could not drink the prep.  CT scan of abdomen Lesion within the mesentery abutting the small bowel measuring approximately 2.9 x 2.7 cm which is indeterminate. no obstruction.     #Unintentional Weight loss/chronic RLQ Abdominal pain/nausea,vomiting  #Lesion within the mesentery abutting the small bowel   #Chronic constipation  - lipase, lactate : normal  - CTAP: Lesion within the mesentery abutting the small bowel measuring approximately 2.9 x 2.7 cm which is indeterminate. Consider further characterization with MRI as clinically indicated.  - MRI pending  - phy exam benign  - no family hx of GI related malignancies, not on AC    RECS  - will recommend EGD/Colonoscopy early next week  - Possible EUS for evaluation of mesentery lesion   - once able to prep , might need 2 day prep atleast  - Start Miralax TID, senna 2 tabs night  - Monitor BMs  - Recommend ambulation, frequent repositioning  - Keep on clear liquid diet  - Will follow   58 yr old male with Down's Syndrome and Dementia at average risk for CRC went to the ED on 10/5/23 for chronic RLQ pain. According to his mother, he is nonverbal but he has been holding his RLQ for the past year. She also stated that he has lost about 25 lbs in the past year. She stated that he has a good appetite and eats well. He has a long H/O constipation; he takes Senokot q day and Miralax without BMs q day. His last BM was 2 days ago. His mother stated he has not had any heartburn, blood in the stool, melena, vomiting, dysphagia. Patient was seen in clinic and was scheduled for EGD and colonoscopy but could not be done as he could not drink the prep.  CT scan of abdomen Lesion within the mesentery abutting the small bowel measuring approximately 2.9 x 2.7 cm which is indeterminate. no obstruction.     #Unintentional Weight loss/chronic RLQ Abdominal pain/nausea,vomiting  #Lesion within the mesentery abutting the small bowel   #Chronic constipation  - lipase, lactate : normal  - CTAP: Lesion within the mesentery abutting the small bowel measuring approximately 2.9 x 2.7 cm which is indeterminate. Consider further characterization with MRI as clinically indicated.  - MRI pending  - phy exam benign  - no family hx of GI related malignancies, not on AC    RECS  - will recommend EGD/Colonoscopy early next week  - once able to prep , might need 2 day prep atleast  - Start Miralax TID, senna 2 tabs night  - Monitor BMs  - Recommend ambulation, frequent repositioning  - Keep on clear liquid diet  - Will follow

## 2024-07-05 NOTE — PROGRESS NOTE ADULT - SUBJECTIVE AND OBJECTIVE BOX
Gastroenterology progress note:     Patient is a 58y old  Male who presents with a chief complaint of abdominal pain (04 Jul 2024 15:04)       Admitted on: 07-03-24    We are following the patient for:       Interval History:    No acute events overnight.     PAST MEDICAL & SURGICAL HISTORY:  Dementia      Down's syndrome      History of amblyopia          MEDICATIONS  (STANDING):  chlorhexidine 2% Cloths 1 Application(s) Topical daily  enoxaparin Injectable 40 milliGRAM(s) SubCutaneous every 24 hours  LORazepam   Injectable 1 milliGRAM(s) IntraMuscular once  polyethylene glycol 3350 17 Gram(s) Oral every 12 hours  senna 2 Tablet(s) Oral at bedtime    MEDICATIONS  (PRN):      Allergies  penicillin (Rash)      Review of Systems:   Cardiovascular:  No Chest Pain, No Palpitations  Respiratory:  No Cough, No Dyspnea  Gastrointestinal:  As described in HPI  Skin:  No Skin Lesions, No Jaundice  Neuro:  No Syncope, No Dizziness    Physical Examination:  T(C): 36.7 (07-05-24 @ 05:39), Max: 38.4 (07-04-24 @ 17:56)  HR: 59 (07-05-24 @ 05:39) (59 - 87)  BP: 101/64 (07-05-24 @ 05:39) (100/64 - 134/78)  RR: 18 (07-05-24 @ 05:39) (18 - 20)  SpO2: 100% (07-05-24 @ 05:39) (97% - 100%)        GENERAL: AAOx3, no acute distress.  HEAD:  Atraumatic, Normocephalic  EYES: conjunctiva and sclera clear  NECK: Supple, no JVD or thyromegaly  CHEST/LUNG: Clear to auscultation bilaterally; No wheeze, rhonchi, or rales  HEART: Regular rate and rhythm; normal S1, S2, No murmurs.  ABDOMEN: Soft, nontender, nondistended; Bowel sounds present  NEUROLOGY: No asterixis or tremor.   SKIN: Intact, no jaundice     Data:                        14.5   4.83  )-----------( 213      ( 04 Jul 2024 11:00 )             43.3     Hgb trend:  14.5  07-04-24 @ 11:00  14.3  07-03-24 @ 12:05      07-04    144  |  106  |  13  ----------------------------<  106<H>  4.3   |  28  |  0.8    Ca    8.9      04 Jul 2024 11:00  Phos  3.5     07-04  Mg     1.8     07-04    TPro  6.3  /  Alb  3.7  /  TBili  0.5  /  DBili  x   /  AST  27  /  ALT  10  /  AlkPhos  75  07-04    Liver panel trend:  TBili 0.5   /   AST 27   /   ALT 10   /   AlkP 75   /   Tptn 6.3   /   Alb 3.7    /   DBili --      07-04  TBili 0.6   /   AST 31   /   ALT 9   /   AlkP 78   /   Tptn 7.0   /   Alb 3.9    /   DBili --      07-03          Culture - Urine (collected 03 Jul 2024 12:00)  Source: Clean Catch Clean Catch (Midstream)  Final Report (04 Jul 2024 15:49):    <10,000 CFU/mL Normal Urogenital Vida         Radiology:

## 2024-07-05 NOTE — DIETITIAN INITIAL EVALUATION ADULT - ENERGY INTAKE
At admit, pt has been on clears and drank broth and apple juice today and yesterday as well. Discussed nutrition supplements -- agreeable to add.

## 2024-07-05 NOTE — PROGRESS NOTE ADULT - SUBJECTIVE AND OBJECTIVE BOX
LAURA TANYA  58y  Male      Patient is a 58y old  Male who presents with a chief complaint of Abdominal pain     (05 Jul 2024 11:15)      INTERVAL HPI/OVERNIGHT EVENTS:      ******************************* REVIEW OF SYSTEMS:**********************************************    All other review of systems negative    *********************** VITALS ******************************************    T(F): 98 (07-05-24 @ 05:39)  HR: 59 (07-05-24 @ 05:39) (59 - 87)  BP: 101/64 (07-05-24 @ 05:39) (100/64 - 127/78)  RR: 18 (07-05-24 @ 05:39) (18 - 18)  SpO2: 100% (07-05-24 @ 05:39) (97% - 100%)            ******************************** PHYSICAL EXAM:**************************************************  GENERAL: NAD    PSYCH: no agitation, HEENT:     NERVOUS SYSTEM:  Alert & awake x2, but remains nonverbal   PULMONARY: SAM, CTA    CARDIOVASCULAR: S1S2 RRR    GI: Soft, NT, ND; BS present.    EXTREMITIES:  2+ Peripheral Pulses, No clubbing, cyanosis, or edema    LYMPH: No lymphadenopathy noted    SKIN: No rashes or lesions      **************************** LABS *******************************************************                          14.5   4.83  )-----------( 213      ( 04 Jul 2024 11:00 )             43.3     07-04    144  |  106  |  13  ----------------------------<  106<H>  4.3   |  28  |  0.8    Ca    8.9      04 Jul 2024 11:00  Phos  3.5     07-04  Mg     1.8     07-04    TPro  6.3  /  Alb  3.7  /  TBili  0.5  /  DBili  x   /  AST  27  /  ALT  10  /  AlkPhos  75  07-04    ABG - ( 04 Jul 2024 02:05 )  pH, Arterial: 7.45  pH, Blood: x     /  pCO2: 38    /  pO2: 125   / HCO3: 26    / Base Excess: 2.4   /  SaO2: 99.6              Urinalysis Basic - ( 04 Jul 2024 11:00 )    Color: x / Appearance: x / SG: x / pH: x  Gluc: 106 mg/dL / Ketone: x  / Bili: x / Urobili: x   Blood: x / Protein: x / Nitrite: x   Leuk Esterase: x / RBC: x / WBC x   Sq Epi: x / Non Sq Epi: x / Bacteria: x        Lactate Trend        CAPILLARY BLOOD GLUCOSE              **************************Active Medications *******************************************  penicillin (Rash)      chlorhexidine 2% Cloths 1 Application(s) Topical daily  enoxaparin Injectable 40 milliGRAM(s) SubCutaneous every 24 hours  polyethylene glycol 3350 17 Gram(s) Oral every 12 hours  senna 2 Tablet(s) Oral at bedtime      ***************************************************  RADIOLOGY & ADDITIONAL TESTS:    Imaging Personally Reviewed:  [ ] YES  [ ] NO    HEALTH ISSUES - PROBLEM Dx:

## 2024-07-06 PROCEDURE — 99232 SBSQ HOSP IP/OBS MODERATE 35: CPT

## 2024-07-06 RX ORDER — POLYETHYLENE GLYCOL 3350 1 G/G
17 POWDER ORAL EVERY 12 HOURS
Refills: 0 | Status: DISCONTINUED | OUTPATIENT
Start: 2024-07-06 | End: 2024-07-08

## 2024-07-06 RX ADMIN — ENOXAPARIN SODIUM 40 MILLIGRAM(S): 100 INJECTION SUBCUTANEOUS at 11:05

## 2024-07-06 RX ADMIN — Medication 1 APPLICATION(S): at 11:07

## 2024-07-06 NOTE — PROGRESS NOTE ADULT - SUBJECTIVE AND OBJECTIVE BOX
Patient is a 58y old  Male who presents with a chief complaint of abdominal pain (05 Jul 2024 17:03)    HPI:  A 58 yr old male with Down's Syndrome and Dementia, no other pmh, presented to the ED with his mother because of abdominal pain and vomiting. Per mother, pt is nonverbal but he has been holding his abdomen for the past few weeks, and he recently lost 20lbs in the past four months despite having good appetite. According to the mother, pt also had NBNB vomiting yesterday.  Of note pt was scheduled for EGD/colono as OP but could not be done because pt could not drink the prep.    In the ED, VS stable, labs unremarkable.  CT A/P showed a lesion within the mesentery abutting the small bowel measuring approximately 2.9 x 2.7 cm which is indeterminate. Otherwise unremarkable.    Patient admitted for further management.   (04 Jul 2024 01:21)       Today is hospital day 3d. This morning patient was seen and examined at bedside, resting comfortably in bed.    No acute or major events overnight.    PAST MEDICAL & SURGICAL HISTORY  Dementia    Down's syndrome    History of amblyopia        SOCIAL HISTORY:  Social History:      Otherwise, as noted in HPI    ALLERGIES:  penicillin (Rash)      MEDICATIONS:  STANDING MEDICATIONS  chlorhexidine 2% Cloths 1 Application(s) Topical daily  enoxaparin Injectable 40 milliGRAM(s) SubCutaneous every 24 hours  polyethylene glycol 3350 17 Gram(s) Oral every 12 hours  senna 2 Tablet(s) Oral at bedtime    PRN MEDICATIONS      VITALS:   T(C): 36.8 (07-06-24 @ 05:17), Max: 36.9 (07-05-24 @ 21:00)  HR: 68 (07-06-24 @ 05:17) (60 - 69)  BP: 109/60 (07-06-24 @ 05:17) (109/60 - 117/74)  RR: 18 (07-06-24 @ 05:17) (18 - 18)  SpO2: 94% (07-06-24 @ 05:17) (94% - 100%)  I&O's Summary    05 Jul 2024 07:01  -  06 Jul 2024 07:00  --------------------------------------------------------  IN: 0 mL / OUT: 500 mL / NET: -500 mL          PHYSICAL EXAM:  GENERAL: Awake  HEAD:  Atraumatic, Normocephalic  EYES: EOMI, PERRLA, conjunctiva and sclera clear  NECK: Supple, No JVD  LUNG: B/L good air entry; No rales, rhonchi, or wheezing  HEART: S1 S2 normal  ABDOMEN: Soft, Nontender, Nondistended; Bowel sounds present  EXTREMITIES:  2+ Peripheral Pulses, No edema      LABS:                        14.5   5.21  )-----------( 226      ( 05 Jul 2024 13:16 )             42.0     07-05    143  |  106  |  9<L>  ----------------------------<  92  4.0   |  22  |  0.7    Ca    8.8      05 Jul 2024 13:16  Phos  3.4     07-05  Mg     1.9     07-05    TPro  6.9  /  Alb  3.8  /  TBili  0.5  /  DBili  x   /  AST  31  /  ALT  11  /  AlkPhos  78  07-05      Lactate, Blood: 1.3 mmol/L (07-05-24 @ 13:16)      Culture - Urine (collected 03 Jul 2024 12:00)  Source: Clean Catch Clean Catch (Midstream)  Final Report (04 Jul 2024 15:49):    <10,000 CFU/mL Normal Urogenital Vida

## 2024-07-06 NOTE — PROGRESS NOTE ADULT - ASSESSMENT
A 58 yr old male with Down's Syndrome and Dementia, no other pmh, presented to the ED with his mother because of abdominal pain and vomiting. Per mother, pt is nonverbal but he has been holding his abdomen for the past few weeks, and he recently lost 20lbs in the past four months despite having good appetite. According to the mother, pt also started having NBNB vomiting. Patient was scheduled for EGD/colono as OP but could not be done because pt could not drink the prep. Admitted for further investigation and management.    #Abdominal pain a/w NBNB vomiting  #Weight loss  #chronic constipation  - Pt nonverbal, but he has been holding his abdomen for the past few weeks  - 20lbs of weight loss over 4 months  - Scheduled for EGD/Colono OP, but could not be donne as pt unable to drink the prep.  - 7/4 CT A/P with oral and IV contrast: Lesion within the mesentery abutting the small bowel measuring approximately 2.9 x 2.7 cm which is indeterminate. Otherwise unremarkable.  No bowel obstruction.  - Previous CT A/P on 10/5/23 was negative.  - On miralax and senna  - f/u MR abdomen. Needs sedation prior to MRI  - GI consult for possible inpatient EGD/Colono possibly early next week.,   - Clear liquid diet > advanced to low fiber diet     DVT ppx: Lovenox  Diet: clear liquid  Acitivty: AAT    Pending: EGD/[place]   Dispo: home ?

## 2024-07-06 NOTE — PROGRESS NOTE ADULT - SUBJECTIVE AND OBJECTIVE BOX
TANYA SANCHEZ  58y  Male      Patient is a 58y old  Male who presents with a chief complaint of abdominal pain.      INTERVAL HPI/OVERNIGHT EVENTS:      ******************************* REVIEW OF SYSTEMS:**********************************************    All other review of systems negative    *********************** VITALS ******************************************    T(F): 98.2 (07-06-24 @ 05:17)  HR: 68 (07-06-24 @ 05:17) (60 - 68)  BP: 109/60 (07-06-24 @ 05:17) (109/60 - 112/74)  RR: 18 (07-06-24 @ 05:17) (18 - 18)  SpO2: 94% (07-06-24 @ 05:17) (94% - 100%)    07-05-24 @ 07:01  -  07-06-24 @ 07:00  --------------------------------------------------------  IN: 0 mL / OUT: 500 mL / NET: -500 mL            07-05-24 @ 07:01  -  07-06-24 @ 07:00  --------------------------------------------------------  IN: 0 mL / OUT: 500 mL / NET: -500 mL        ******************************** PHYSICAL EXAM:**************************************************  GENERAL: NAD    PSYCH: no agitation, baseline mentation  HEENT:     NERVOUS SYSTEM:  Alert & awake x 2, nonverbal ,     PULMONARY: SAM, CTA    CARDIOVASCULAR: S1S2 RRR    GI: Soft, NT, ND; BS present.    EXTREMITIES:  2+ Peripheral Pulses, No clubbing, cyanosis, or edema    LYMPH: No lymphadenopathy noted    SKIN: No rashes or lesions      **************************** LABS *******************************************************                          14.5   5.21  )-----------( 226      ( 05 Jul 2024 13:16 )             42.0     07-05    143  |  106  |  9<L>  ----------------------------<  92  4.0   |  22  |  0.7    Ca    8.8      05 Jul 2024 13:16  Phos  3.4     07-05  Mg     1.9     07-05    TPro  6.9  /  Alb  3.8  /  TBili  0.5  /  DBili  x   /  AST  31  /  ALT  11  /  AlkPhos  78  07-05      Urinalysis Basic - ( 05 Jul 2024 13:16 )    Color: x / Appearance: x / SG: x / pH: x  Gluc: 92 mg/dL / Ketone: x  / Bili: x / Urobili: x   Blood: x / Protein: x / Nitrite: x   Leuk Esterase: x / RBC: x / WBC x   Sq Epi: x / Non Sq Epi: x / Bacteria: x        Lactate Trend  07-05 @ 13:16 Lactate:1.3         CAPILLARY BLOOD GLUCOSE              **************************Active Medications *******************************************  penicillin (Rash)      chlorhexidine 2% Cloths 1 Application(s) Topical daily  enoxaparin Injectable 40 milliGRAM(s) SubCutaneous every 24 hours  polyethylene glycol 3350 17 Gram(s) Oral every 12 hours  senna 2 Tablet(s) Oral at bedtime      ***************************************************  RADIOLOGY & ADDITIONAL TESTS:    Imaging Personally Reviewed:  [ ] YES  [ ] NO    HEALTH ISSUES - PROBLEM Dx:

## 2024-07-07 PROCEDURE — 99232 SBSQ HOSP IP/OBS MODERATE 35: CPT

## 2024-07-07 RX ADMIN — POLYETHYLENE GLYCOL 3350 17 GRAM(S): 1 POWDER ORAL at 17:53

## 2024-07-07 RX ADMIN — Medication 1 APPLICATION(S): at 11:19

## 2024-07-07 RX ADMIN — ENOXAPARIN SODIUM 40 MILLIGRAM(S): 100 INJECTION SUBCUTANEOUS at 11:18

## 2024-07-07 NOTE — PROGRESS NOTE ADULT - SUBJECTIVE AND OBJECTIVE BOX
LAURA TANYA  58y  Male      Patient is a 58y old  Male who presents with a chief complaint of abdominal pain.      INTERVAL HPI/OVERNIGHT EVENTS:      ******************************* REVIEW OF SYSTEMS:**********************************************    All other review of systems negative    *********************** VITALS ******************************************    T(F): 97.5 (07-07-24 @ 11:30)  HR: 65 (07-07-24 @ 11:30) (65 - 91)  BP: 133/81 (07-07-24 @ 11:30) (99/69 - 133/81)  RR: 15 (07-07-24 @ 11:30) (15 - 18)  SpO2: 100% (07-07-24 @ 11:30) (96% - 100%)            ******************************** PHYSICAL EXAM:**************************************************  GENERAL: NAD    PSYCH: no agitation, baseline mentation  HEENT:     NERVOUS SYSTEM:  Alert & Oriented X3,     PULMONARY: SAM, CTA    CARDIOVASCULAR: S1S2 RRR    GI: Soft, NT, ND; BS present.    EXTREMITIES:  2+ Peripheral Pulses, No clubbing, cyanosis, or edema    LYMPH: No lymphadenopathy noted    SKIN: No rashes or lesions      **************************** LABS *******************************************************                  Lactate Trend  07-05 @ 13:16 Lactate:1.3         CAPILLARY BLOOD GLUCOSE              **************************Active Medications *******************************************  penicillin (Rash)      chlorhexidine 2% Cloths 1 Application(s) Topical daily  enoxaparin Injectable 40 milliGRAM(s) SubCutaneous every 24 hours  polyethylene glycol 3350 17 Gram(s) Oral every 12 hours  senna 2 Tablet(s) Oral at bedtime      ***************************************************  RADIOLOGY & ADDITIONAL TESTS:    Imaging Personally Reviewed:  [ ] YES  [ ] NO    HEALTH ISSUES - PROBLEM Dx:

## 2024-07-07 NOTE — PROGRESS NOTE ADULT - ASSESSMENT
A 58 yr old male with Down's Syndrome and Dementia, no other pmh, presented to the ED with his mother because of abdominal pain and vomiting. Per mother, pt is nonverbal but he has been holding his abdomen for the past few weeks, and he recently lost 20lbs in the past four months despite having good appetite. According to the mother, pt also started having NBNB vomiting. Patient was scheduled for EGD/colono as OP but could not be done because pt could not drink the prep. Admitted for further investigation and management.    #Abdominal pain a/w NBNB vomiting  #Weight loss  #chronic constipation  - Pt nonverbal, but he has been holding his abdomen for the past few weeks  - 20lbs of weight loss over 4 months  - Scheduled for EGD/Colono OP, but could not be donne as pt unable to drink the prep.  - 7/4 CT A/P with oral and IV contrast: Lesion within the mesentery abutting the small bowel measuring approximately 2.9 x 2.7 cm which is indeterminate. Otherwise unremarkable.  No bowel obstruction.  - Previous CT A/P on 10/5/23 was negative.  - On miralax and senna  - f/u MR abdomen. Needs sedation prior to MRI  - GI consult for possible inpatient EGD/Colono possibly early next week.,   - Clear liquid diet > advanced to low fiber diet     DVT ppx: Lovenox  Diet: clear liquid  Acitivty: AAT    Pending: MR Abd/  EGD/[place]   Dispo: home ?

## 2024-07-08 PROCEDURE — 99232 SBSQ HOSP IP/OBS MODERATE 35: CPT

## 2024-07-08 PROCEDURE — 99233 SBSQ HOSP IP/OBS HIGH 50: CPT

## 2024-07-08 RX ORDER — POLYETHYLENE GLYCOL 3350 1 G/G
17 POWDER ORAL EVERY 12 HOURS
Refills: 0 | Status: DISCONTINUED | OUTPATIENT
Start: 2024-07-08 | End: 2024-07-15

## 2024-07-08 RX ADMIN — Medication 1 APPLICATION(S): at 11:50

## 2024-07-08 RX ADMIN — ENOXAPARIN SODIUM 40 MILLIGRAM(S): 100 INJECTION SUBCUTANEOUS at 11:47

## 2024-07-08 RX ADMIN — POLYETHYLENE GLYCOL 3350 17 GRAM(S): 1 POWDER ORAL at 18:45

## 2024-07-08 NOTE — PROGRESS NOTE ADULT - SUBJECTIVE AND OBJECTIVE BOX
TANYA SANCHEZ  [facility] 026 A ([facility])      Patient was evaluated and examined  by bedside, no abdominal pain, no fever, no N/V/D      REVIEW OF SYSTEMS: unable to obtain, patient is a poor historian        T(C): , Max: 36.5 (07-08-24 @ 13:20)  HR: 52 (07-08-24 @ 13:20)  BP: 103/70 (07-08-24 @ 13:20)  RR: 16 (07-08-24 @ 13:20)  SpO2: 100% (07-08-24 @ 13:20)  CAPILLARY BLOOD GLUCOSE          PHYSICAL EXAM:  General: NAD, Awake , patient is sitting  comfortably in a chair   HEENT: AT, NC, Supple, NO JVD, NO CB  Lungs: CTA B/L, no wheezing, no rhonchi  CVS: normal S1, S2, RRR, NO M/G/R  Abdomen: soft, bowel sounds present, non-tender, non-distended  Extremities: no edema, no clubbing, no cyanosis, positive peripheral pulses b/l  Neuro: no acute focal neurological deficits  Skin: no rash, no ecchymosis      LAB  CBC  Date: 07-05-24 @ 13:16  Mean cell Rhcpjvgdca86.3  Mean cell Hemoglobin Conc34.5  Mean cell Volum 93.5  Platelet count-Automate 226  RBC Count 4.49  Red Cell Distrib Width13.2  WBC Count5.21  % Albumin, Urine--  Hematocrit 42.0  Hemoglobin 14.5  CBC  Date: 07-04-24 @ 11:00  Mean cell Xlrhlhrnxq84.2  Mean cell Hemoglobin Conc33.5  Mean cell Volum 96.0  Platelet count-Automate 213  RBC Count 4.51  Red Cell Distrib Width13.6  WBC Count4.83  % Albumin, Urine--  Hematocrit 43.3  Hemoglobin 14.5  CBC  Date: 07-03-24 @ 12:05  Mean cell Aqbzjgobrm53.4  Mean cell Hemoglobin Conc33.6  Mean cell Volum 96.4  Platelet count-Automate 222  RBC Count 4.42  Red Cell Distrib Width13.8  WBC Count6.62  % Albumin, Urine--  Hematocrit 42.6  Hemoglobin 14.3    BMP  07-05-24 @ 13:16  Blood Gas Arterial-Calcium,Ionized--  Blood Urea Nitrogen, Serum 9 mg/dL<L> [10 - 20]  Carbon Dioxide, Serum22 mmol/L [17 - 32]  Chloride, Mtupn734 mmol/L [98 - 110]  Creatinie, Serum0.7 mg/dL [0.7 - 1.5]  Glucose, Serum92 mg/dL [70 - 99]  Potassium, Serum4.0 mmol/L [3.5 - 5.0]  Sodium, Serum 143 mmol/L [135 - 146]  BMP  07-04-24 @ 11:00  Blood Gas Arterial-Calcium,Ionized--  Blood Urea Nitrogen, Serum 13 mg/dL [10 - 20]  Carbon Dioxide, Serum28 mmol/L [17 - 32]  Chloride, Djhbt407 mmol/L [98 - 110]  Creatinie, Serum0.8 mg/dL [0.7 - 1.5]  Glucose, Taykf010 mg/dL<H> [70 - 99]  Potassium, Serum4.3 mmol/L [3.5 - 5.0]  Sodium, Serum 144 mmol/L [135 - 146]  BMP  07-04-24 @ 02:05  Blood Gas Arterial-Calcium,Ionized1.19 mmol/L [1.15 - 1.33]  Blood Urea Nitrogen, Serum --  Carbon Dioxide, Serum--  Chloride, Serum--  Creatinie, Serum--  Glucose, Serum--  Potassium, Serum--  Sodium, Serum --              Microbiology:    Culture - Blood (collected 07-05-24 @ 13:20)  Source: .Blood Blood  Preliminary Report (07-07-24 @ 22:01):    No growth at 48 Hours    Culture - Urine (collected 07-03-24 @ 12:00)  Source: Clean Catch Clean Catch (Midstream)  Final Report (07-04-24 @ 15:49):    <10,000 CFU/mL Normal Urogenital Vida      Medications:  chlorhexidine 2% Cloths 1 Application(s) Topical daily  enoxaparin Injectable 40 milliGRAM(s) SubCutaneous every 24 hours  polyethylene glycol 3350 17 Gram(s) Oral every 12 hours  senna 2 Tablet(s) Oral at bedtime        Assessment and Plan:  Patient is a  57 y/o  male with Down's Syndrome and Dementia, no other pmh, presented to the ED with his mother because of abdominal pain and vomiting. Per mother, pt is nonverbal but he has been holding his abdomen for the past few weeks, and he recently lost 20lbs in the past four months despite having good appetite. According to the mother, pt also started having NBNB vomiting. Patient was scheduled for EGD/colono as OP but could not be done because pt could not drink the prep. Admitted for further investigation and management.    #Abdominal pain , Newly dx. Mesenteric Lesion  #Weight loss  #chronic constipation  - Pt nonverbal, but he has been holding his abdomen for the past few weeks  - 20lbs of weight loss over 4 months  - Scheduled for EGD/Colono OP, but could not be donne as pt unable to drink the prep.  - 7/4 CT A/P with oral and IV contrast: Lesion within the mesentery abutting the small bowel measuring approximately 2.9 x 2.7 cm which is indeterminate. Otherwise unremarkable.  No bowel obstruction.  - Previous CT A/P on 10/5/23 was negative.  - On miralax and senna  - f/u MR abdomen. Needs sedation prior to MRI  - GI consulted planning for  inpatient EGD/Colonoscopy   - regular diet as tolerated      DVT ppx: Lovenox  Acitivty: AAT    Pending: MR Abdomen,   EGD/Colonoscopy    Dispo: home with caregiver once medically stable     Total time spent to complete patient's bedside assessment, review medical chart, discuss medical plan of care with covering medical team was more than 35 minutes with >50% of time spent face to face with patient, discussion with patient/family and/or coordination of care

## 2024-07-08 NOTE — PROGRESS NOTE ADULT - ASSESSMENT
58 yr old male with Down's Syndrome and Dementia at average risk for CRC went to the ED on 10/5/23 for chronic RLQ pain. According to his mother, he is nonverbal but he has been holding his RLQ for the past year. She also stated that he has lost about 25 lbs in the past year. She stated that he has a good appetite and eats well. He has a long H/O constipation; he takes Senokot q day and Miralax without BMs q day. His last BM was 2 days ago. His mother stated he has not had any heartburn, blood in the stool, melena, vomiting, dysphagia. Patient was seen in clinic and was scheduled for EGD and colonoscopy but could not be done as he could not drink the prep.  CT scan of abdomen Lesion within the mesentery abutting the small bowel measuring approximately 2.9 x 2.7 cm which is indeterminate. no obstruction.     #Unintentional Weight loss/chronic RLQ Abdominal pain/nausea,vomiting  #Lesion within the mesentery abutting the small bowel   #Chronic constipation  - lipase, lactate : normal  - CTAP: Lesion within the mesentery abutting the small bowel measuring approximately 2.9 x 2.7 cm which is indeterminate. Consider further characterization with MRI as clinically indicated.  - MRI pending  - phy exam benign  - no family hx of GI related malignancies, not on AC    RECS  - planning for inpatient EGD/Colonoscopy   - Pending Mr abdomen for eval of mesenteric lesion. May pursue EUS guided biopsy vs IR   - once able to prep , might need 2 day prep atleast  - Start Miralax TID, senna 2 tabs night  - Monitor BMs  - Recommend ambulation, frequent repositioning  - Keep on clear liquid diet  - Will follow     58 yr old male with Down's Syndrome and Dementia at average risk for CRC went to the ED on 10/5/23 for chronic RLQ pain. According to his mother, he is nonverbal but he has been holding his RLQ for the past year. She also stated that he has lost about 25 lbs in the past year. She stated that he has a good appetite and eats well. He has a long H/O constipation; he takes Senokot q day and Miralax without BMs q day. His last BM was 2 days ago. His mother stated he has not had any heartburn, blood in the stool, melena, vomiting, dysphagia. Patient was seen in clinic and was scheduled for EGD and colonoscopy but could not be done as he could not drink the prep.  CT scan of abdomen Lesion within the mesentery abutting the small bowel measuring approximately 2.9 x 2.7 cm which is indeterminate. no obstruction.     #Unintentional Weight loss/chronic RLQ Abdominal pain/nausea,vomiting  #Lesion within the mesentery abutting the small bowel   #Chronic constipation  - lipase, lactate : normal  - CTAP: Lesion within the mesentery abutting the small bowel measuring approximately 2.9 x 2.7 cm which is indeterminate. Consider further characterization with MRI as clinically indicated.  - MRI pending  - phy exam benign  - no family hx of GI related malignancies, not on AC    RECS  - planning for inpatient EGD/Colonoscopy   - Pending MR abdomen for eval of mesenteric lesion. May pursue EUS guided biopsy vs IR   - once able to prep , might need 2 day prep atleast  - Start Miralax TID, senna 2 tabs night  - Monitor BMs  - Recommend ambulation, frequent repositioning  - Keep on clear liquid diet  - Will follow

## 2024-07-08 NOTE — PROGRESS NOTE ADULT - SUBJECTIVE AND OBJECTIVE BOX
Gastroenterology progress note:     Patient is a 58y old  Male who presents with a chief complaint of abdominal pain (07 Jul 2024 13:59)       Admitted on: 07-03-24    We are following the patient for: EGD/Colonoscopy       Interval History: pending MR    No acute events overnight.   - Diet - low fiber  - last BM - 7/7  - Abdominal pain - denies      PAST MEDICAL & SURGICAL HISTORY:  Dementia      Down's syndrome      History of amblyopia          MEDICATIONS  (STANDING):  chlorhexidine 2% Cloths 1 Application(s) Topical daily  enoxaparin Injectable 40 milliGRAM(s) SubCutaneous every 24 hours  polyethylene glycol 3350 17 Gram(s) Oral every 12 hours  senna 2 Tablet(s) Oral at bedtime    MEDICATIONS  (PRN):      Allergies  penicillin (Rash)      Review of Systems:   Cardiovascular:  No Chest Pain, No Palpitations  Respiratory:  No Cough, No Dyspnea  Gastrointestinal:  As described in HPI  Skin:  No Skin Lesions, No Jaundice  Neuro:  No Syncope, No Dizziness    Physical Examination:  T(C): 36.2 (07-08-24 @ 05:04), Max: 36.4 (07-07-24 @ 11:30)  HR: 57 (07-08-24 @ 05:04) (55 - 65)  BP: 119/52 (07-08-24 @ 05:04) (119/52 - 146/76)  RR: 17 (07-08-24 @ 05:04) (15 - 17)  SpO2: 100% (07-08-24 @ 05:04) (100% - 100%)        GENERAL: AAOx0, no acute distress.  HEAD:  Atraumatic, Normocephalic  EYES: conjunctiva and sclera clear  NECK: Supple, no JVD or thyromegaly  CHEST/LUNG: Clear to auscultation bilaterally; No wheeze, rhonchi, or rales  HEART: Regular rate and rhythm; normal S1, S2, No murmurs.  ABDOMEN: Soft, nontender, nondistended; Bowel sounds present  NEUROLOGY: No asterixis or tremor.   SKIN: Intact, no jaundice     Data:    Hgb trend:  14.5  07-05-24 @ 13:16              Liver panel trend:  TBili 0.5   /   AST 31   /   ALT 11   /   AlkP 78   /   Tptn 6.9   /   Alb 3.8    /   DBili --      07-05  TBili 0.5   /   AST 27   /   ALT 10   /   AlkP 75   /   Tptn 6.3   /   Alb 3.7    /   DBili --      07-04  TBili 0.6   /   AST 31   /   ALT 9   /   AlkP 78   /   Tptn 7.0   /   Alb 3.9    /   DBili --      07-03          Culture - Blood (collected 05 Jul 2024 13:20)  Source: .Blood Blood  Preliminary Report (07 Jul 2024 22:01):    No growth at 48 Hours         Radiology:

## 2024-07-08 NOTE — PROGRESS NOTE ADULT - SUBJECTIVE AND OBJECTIVE BOX
Patient is a 58y old Male who presents with a chief complaint of abdominal pain. Today is hospital day 5d. This morning patient was seen and examined at bedside, resting comfortably in bed. the pt is nonverbal at baseline. Per the mother, the pt hasn't vomited since being admitted and has noticed that he has been grabbing his abdomen less often.    No acute or major events overnight.    Code Status:      PAST MEDICAL & SURGICAL HISTORY  Dementia    Down's syndrome    History of amblyopia      SOCIAL HISTORY:  Social History:      ALLERGIES:  penicillin (Rash)    MEDICATIONS:  STANDING MEDICATIONS  chlorhexidine 2% Cloths 1 Application(s) Topical daily  enoxaparin Injectable 40 milliGRAM(s) SubCutaneous every 24 hours  polyethylene glycol 3350 17 Gram(s) Oral every 12 hours  senna 2 Tablet(s) Oral at bedtime    PRN MEDICATIONS    VITALS:   T(F): 97.7  HR: 52  BP: 103/70  RR: 16  SpO2: 100%    PHYSICAL EXAM:  GENERAL: NAD, lying in bed comfortably  HEAD:  Atraumatic, Normocephalic  EYES: EOMI, PERRLA, conjunctiva and sclera clear  NECK: Supple, No JVD  CHEST/LUNG: Clear to auscultation bilaterally; No rales, rhonchi, wheezing, or rubs. Unlabored respirations  HEART: Regular rate and rhythm; No murmurs, rubs, or gallops  ABDOMEN: BSx4; Soft, nontender, nondistended  EXTREMITIES:  2+ Peripheral Pulses, brisk capillary refill. No clubbing, cyanosis, or edema  NERVOUS SYSTEM:  A&Ox3, no focal deficits   SKIN: No rashes or lesions

## 2024-07-08 NOTE — PROGRESS NOTE ADULT - ASSESSMENT
Patient is a  57 y/o  male with Down's Syndrome and Dementia, no other pmh, presented to the ED with his mother because of abdominal pain and vomiting. Per mother, pt is nonverbal but he has been holding his abdomen for the past few weeks, and he recently lost 20lbs in the past four months despite having good appetite. According to the mother, pt also started having NBNB vomiting. Patient was scheduled for EGD/colono as OP but could not be done because pt could not drink the prep. Admitted for further investigation and management.    #Abdominal pain , Newly dx. Mesenteric Lesion  #Weight loss  #chronic constipation  - Pt nonverbal, but he has been holding his abdomen for the past few weeks  - 20lbs of weight loss over 4 months  - Scheduled for EGD/Colono OP, but could not be donne as pt unable to drink the prep.  - 7/4 CT A/P with oral and IV contrast: Lesion within the mesentery abutting the small bowel measuring approximately 2.9 x 2.7 cm which is indeterminate. Otherwise unremarkable.  No bowel obstruction.  - Previous CT A/P on 10/5/23 was negative.  - On miralax and senna  - MR abdomen scheduled for Wednesday with anesthesia; make NPO at midnight  - GI planning for EGD/colonoscopy inpatient   - regular diet as tolerated      DVT ppx: Lovenox  Acitivty: AAT

## 2024-07-09 PROCEDURE — 99232 SBSQ HOSP IP/OBS MODERATE 35: CPT

## 2024-07-09 RX ADMIN — Medication 2 TABLET(S): at 21:14

## 2024-07-09 RX ADMIN — POLYETHYLENE GLYCOL 3350 17 GRAM(S): 1 POWDER ORAL at 05:56

## 2024-07-09 RX ADMIN — Medication 1 APPLICATION(S): at 13:05

## 2024-07-09 RX ADMIN — POLYETHYLENE GLYCOL 3350 17 GRAM(S): 1 POWDER ORAL at 17:19

## 2024-07-09 NOTE — PROGRESS NOTE ADULT - ASSESSMENT
58 yr old male with Down's Syndrome and Dementia at average risk for CRC went to the ED on 10/5/23 for chronic RLQ pain. According to his mother, he is nonverbal but he has been holding his RLQ for the past year. She also stated that he has lost about 25 lbs in the past year. She stated that he has a good appetite and eats well. He has a long H/O constipation; he takes Senokot q day and Miralax without BMs q day. His last BM was 2 days ago. His mother stated he has not had any heartburn, blood in the stool, melena, vomiting, dysphagia. Patient was seen in clinic and was scheduled for EGD and colonoscopy but could not be done as he could not drink the prep.  CT scan of abdomen Lesion within the mesentery abutting the small bowel measuring approximately 2.9 x 2.7 cm which is indeterminate. no obstruction.     #Unintentional Weight loss/chronic RLQ Abdominal pain/nausea,vomiting  #Lesion within the mesentery abutting the small bowel   #Chronic constipation  - lipase, lactate : normal  - CTAP: Lesion within the mesentery abutting the small bowel measuring approximately 2.9 x 2.7 cm which is indeterminate. Consider further characterization with MRI as clinically indicated.  - MRI pending  - phy exam benign  - no family hx of GI related malignancies, not on AC    RECS  - planning for inpatient EGD/Colonoscopy   - Pending MR abdomen for eval of mesenteric lesion. May pursue EUS guided biopsy vs IR   - once able to prep , might need 2 day prep atleast  - Start Miralax TID, senna 2 tabs night  - Monitor BMs  - Recommend ambulation, frequent repositioning  - Keep on clear liquid diet  - Will follow   58 yr old male with Down's Syndrome and Dementia at average risk for CRC went to the ED on 10/5/23 for chronic RLQ pain. According to his mother, he is nonverbal but he has been holding his RLQ for the past year. She also stated that he has lost about 25 lbs in the past year. She stated that he has a good appetite and eats well. He has a long H/O constipation; he takes Senokot q day and Miralax without BMs q day. His last BM was 2 days ago. His mother stated he has not had any heartburn, blood in the stool, melena, vomiting, dysphagia. Patient was seen in clinic and was scheduled for EGD and colonoscopy but could not be done as he could not drink the prep.  CT scan of abdomen Lesion within the mesentery abutting the small bowel measuring approximately 2.9 x 2.7 cm which is indeterminate. no obstruction.     #Unintentional Weight loss/chronic RLQ Abdominal pain/ nausea, vomiting  #Lesion within the mesentery abutting the small bowel   #Chronic constipation  - lipase, lactate : normal  - CTAP: Lesion within the mesentery abutting the small bowel measuring approximately 2.9 x 2.7 cm which is indeterminate. Consider further characterization with MRI as clinically indicated.  - MRI pending  - phy exam benign  - no family hx of GI related malignancies, not on AC    RECS  - planning for inpatient EGD/Colonoscopy   - Pending MR abdomen for eval of mesenteric lesion. May pursue EUS guided biopsy vs IR   - once able to prep , might need 2 day prep at least  - Start Miralax TID, senna 2 tabs night  - Monitor BMs  - Recommend ambulation, frequent repositioning  - Keep on clear liquid diet  - Will follow

## 2024-07-09 NOTE — PROGRESS NOTE ADULT - SUBJECTIVE AND OBJECTIVE BOX
Patient is a 58y old Male who presents with a chief complaint of abdominal pain. Today is hospital day 6d. This morning patient was seen and examined at bedside, resting comfortably in bed. The pt is nonverbal at baseline. Per the mother, the pt hasn't had a vomiting episode and seems to be in less pain. The mother was worried that he wasn't urinating more than a couple times a day.   No acute or major events overnight.    Code Status:      PAST MEDICAL & SURGICAL HISTORY  Dementia    Down's syndrome    History of amblyopia      SOCIAL HISTORY:  Social History:      ALLERGIES:  penicillin (Rash)    MEDICATIONS:  STANDING MEDICATIONS  chlorhexidine 2% Cloths 1 Application(s) Topical daily  enoxaparin Injectable 40 milliGRAM(s) SubCutaneous every 24 hours  polyethylene glycol 3350 17 Gram(s) Oral every 12 hours  senna 2 Tablet(s) Oral at bedtime    PRN MEDICATIONS    VITALS:   T(F): 97.7  HR: 52  BP: 103/70  RR: 16  SpO2: 100%    PHYSICAL EXAM:  GENERAL: NAD, lying in bed comfortably  HEAD:  Atraumatic, Normocephalic  EYES: EOMI, PERRLA, conjunctiva and sclera clear  NECK: Supple, No JVD  CHEST/LUNG: Clear to auscultation bilaterally; No rales, rhonchi, wheezing, or rubs. Unlabored respirations  HEART: Regular rate and rhythm; No murmurs, rubs, or gallops  ABDOMEN: BSx4; Soft, nontender, nondistended  EXTREMITIES:  2+ Peripheral Pulses, brisk capillary refill. No clubbing, cyanosis, or edema  NERVOUS SYSTEM:  A&Ox3, no focal deficits   SKIN: No rashes or lesions

## 2024-07-09 NOTE — PROGRESS NOTE ADULT - ATTENDING COMMENTS
Patient is a  59 y/o  male with Down's Syndrome and Dementia, no other pmh, presented to the ED with his mother because of abdominal pain and vomiting. Per mother, pt is nonverbal but he has been holding his abdomen for the past few weeks, and he recently lost 20lbs in the past four months despite having good appetite. According to the mother, pt also started having NBNB vomiting. Patient was scheduled for EGD/colono as OP but could not be done because pt could not drink the prep. Admitted for further investigation and management.    #Abdominal pain , Newly dx. Mesenteric Lesion  #Weight loss  #chronic constipation  - Pt nonverbal, but he has been holding his abdomen for the past few weeks  - 20lbs of weight loss over 4 months  - Scheduled for EGD/Colono OP, but could not be donne as pt unable to drink the prep.  - 7/4 CT A/P with oral and IV contrast: Lesion within the mesentery abutting the small bowel measuring approximately 2.9 x 2.7 cm which is indeterminate. Otherwise unremarkable.  No bowel obstruction.  - Previous CT A/P on 10/5/23 was negative.  - On miralax and senna  - f/u MR abdomen. Needs sedation prior to MRI  - GI consulted planning for  inpatient EGD/Colonoscopy   - regular diet as tolerated      DVT ppx: Lovenox  Acitivty: AAT    Pending: MR Abdomen,   EGD/Colonoscopy    Dispo: home with caregiver once medically stable     Total time spent to complete patient's bedside assessment, review medical chart, discuss medical plan of care with covering medical team was more than 35 minutes with >50% of time spent face to face with patient, discussion with patient/family and/or coordination of care

## 2024-07-09 NOTE — PROGRESS NOTE ADULT - SUBJECTIVE AND OBJECTIVE BOX
Gastroenterology progress note:     Patient is a 58y old  Male who presents with a chief complaint of abdominal pain (09 Jul 2024 10:36)       Admitted on: 07-03-24    We are following the patient for: colonoscopy       Interval History:    No acute events overnight.     PAST MEDICAL & SURGICAL HISTORY:  Dementia      Down's syndrome      History of amblyopia          MEDICATIONS  (STANDING):  chlorhexidine 2% Cloths 1 Application(s) Topical daily  enoxaparin Injectable 40 milliGRAM(s) SubCutaneous every 24 hours  polyethylene glycol 3350 17 Gram(s) Oral every 12 hours  senna 2 Tablet(s) Oral at bedtime    MEDICATIONS  (PRN):      Allergies  penicillin (Rash)      Physical Examination:  T(C): 36.4 (07-09-24 @ 05:00), Max: 36.9 (07-08-24 @ 20:00)  HR: 56 (07-09-24 @ 05:00) (48 - 56)  BP: 107/64 (07-09-24 @ 05:00) (103/70 - 122/59)  RR: 18 (07-09-24 @ 05:00) (16 - 18)  SpO2: 97% (07-09-24 @ 05:00) (97% - 100%)        GENERAL: AAOx0, no acute distress.  HEAD:  Atraumatic, Normocephalic  EYES: conjunctiva and sclera clear  NECK: Supple, no JVD or thyromegaly  CHEST/LUNG: Clear to auscultation bilaterally; No wheeze, rhonchi, or rales  HEART: Regular rate and rhythm; normal S1, S2, No murmurs.  ABDOMEN: Soft, nontender, nondistended; Bowel sounds present  NEUROLOGY: No asterixis or tremor.   SKIN: Intact, no jaundice     Data:    Hgb trend:            Liver panel trend:  TBili 0.5   /   AST 31   /   ALT 11   /   AlkP 78   /   Tptn 6.9   /   Alb 3.8    /   DBili --      07-05  TBili 0.5   /   AST 27   /   ALT 10   /   AlkP 75   /   Tptn 6.3   /   Alb 3.7    /   DBili --      07-04  TBili 0.6   /   AST 31   /   ALT 9   /   AlkP 78   /   Tptn 7.0   /   Alb 3.9    /   DBili --      07-03             Radiology:

## 2024-07-09 NOTE — PROGRESS NOTE ADULT - ASSESSMENT
Patient is a  59 y/o  male with Down's Syndrome and Dementia, no other pmh, presented to the ED with his mother because of abdominal pain and vomiting. Per mother, pt is nonverbal but he has been holding his abdomen for the past few weeks, and he recently lost 20lbs in the past four months despite having good appetite. According to the mother, pt also started having NBNB vomiting. Patient was scheduled for EGD/colono as OP but could not be done because pt could not drink the prep. Admitted for further investigation and management.    #Abdominal pain , Newly dx. Mesenteric Lesion  #Weight loss  #chronic constipation  - Pt nonverbal, but he has been holding his abdomen for the past few weeks  - 20lbs of weight loss over 4 months  - Scheduled for EGD/Colono OP, but could not be donne as pt unable to drink the prep.  - 7/4 CT A/P with oral and IV contrast: Lesion within the mesentery abutting the small bowel measuring approximately 2.9 x 2.7 cm which is indeterminate. Otherwise unremarkable.  No bowel obstruction.  - Previous CT A/P on 10/5/23 was negative.  - On miralax and senna  - MR abdomen scheduled for Wednesday with anesthesia; make NPO at midnight  - F/U GI planning for EGD/colonoscopy inpatient   - regular diet as tolerated      DVT ppx: Lovenox  Acitivty: AAT

## 2024-07-10 LAB
CULTURE RESULTS: SIGNIFICANT CHANGE UP
SPECIMEN SOURCE: SIGNIFICANT CHANGE UP

## 2024-07-10 PROCEDURE — 99233 SBSQ HOSP IP/OBS HIGH 50: CPT

## 2024-07-10 PROCEDURE — 99232 SBSQ HOSP IP/OBS MODERATE 35: CPT

## 2024-07-10 RX ORDER — PEG3350/SOD SULF,BICARB,CL/KCL 240-22.72G
2000 SOLUTION, RECONSTITUTED, ORAL ORAL ONCE
Refills: 0 | Status: COMPLETED | OUTPATIENT
Start: 2024-07-10 | End: 2024-07-11

## 2024-07-10 RX ADMIN — ENOXAPARIN SODIUM 40 MILLIGRAM(S): 100 INJECTION SUBCUTANEOUS at 11:38

## 2024-07-10 RX ADMIN — Medication 1 APPLICATION(S): at 11:38

## 2024-07-10 RX ADMIN — POLYETHYLENE GLYCOL 3350 17 GRAM(S): 1 POWDER ORAL at 05:07

## 2024-07-10 RX ADMIN — Medication 2 TABLET(S): at 21:38

## 2024-07-10 NOTE — PROGRESS NOTE ADULT - ATTENDING COMMENTS
Patient is a  59 y/o  male with Down's Syndrome and Dementia, no other pmh, presented to the ED with his mother because of abdominal pain and vomiting. Per mother, pt is nonverbal but he has been holding his abdomen for the past few weeks, and he recently lost 20lbs in the past four months despite having good appetite. According to the mother, pt also started having NBNB vomiting. Patient was scheduled for EGD/colono as OP but could not be done because pt could not drink the prep. Admitted for further investigation and management.    #Abdominal pain , Newly dx. Mesenteric Lesion  #Weight loss  #chronic constipation  - Pt nonverbal, but he has been holding his abdomen for the past few weeks  - 20lbs of weight loss over 4 months  - Scheduled for EGD/Colono OP, but could not be donne as pt unable to drink the prep.  - 7/4 CT A/P with oral and IV contrast: Lesion within the mesentery abutting the small bowel measuring approximately 2.9 x 2.7 cm which is indeterminate. Otherwise unremarkable.  No bowel obstruction.  - Previous CT A/P on 10/5/23 was negative.  - On miralax and senna  - f/u MR abdomen. Needs sedation prior to MRI > Planned for 7/10.   - GI consulted planning for  inpatient EGD/Colonoscopy   - regular diet as tolerated      DVT ppx: Lovenox  Acitivty: AAT    Pending: MR Abdomen,   EGD/Colonoscopy    Dispo: home with caregiver once medically stable

## 2024-07-10 NOTE — PROGRESS NOTE ADULT - SUBJECTIVE AND OBJECTIVE BOX
Gastroenterology progress note:     Patient is a 58y old  Male who presents with a chief complaint of abdominal pain (10 Jul 2024 09:01)       Admitted on: 07-03-24    We are following the patient for:       Interval History:    No acute events overnight.     PAST MEDICAL & SURGICAL HISTORY:  Dementia      Down's syndrome      History of amblyopia          MEDICATIONS  (STANDING):  chlorhexidine 2% Cloths 1 Application(s) Topical daily  enoxaparin Injectable 40 milliGRAM(s) SubCutaneous every 24 hours  polyethylene glycol 3350 17 Gram(s) Oral every 12 hours  senna 2 Tablet(s) Oral at bedtime    MEDICATIONS  (PRN):      Allergies  penicillin (Rash)      Review of Systems:   Cardiovascular:  No Chest Pain, No Palpitations  Respiratory:  No Cough, No Dyspnea  Gastrointestinal:  As described in HPI  Skin:  No Skin Lesions, No Jaundice  Neuro:  No Syncope, No Dizziness    Physical Examination:  T(C): 36.6 (07-10-24 @ 12:10), Max: 36.8 (07-09-24 @ 22:02)  HR: 56 (07-10-24 @ 12:10) (56 - 61)  BP: 129/63 (07-10-24 @ 12:10) (107/62 - 150/77)  RR: 16 (07-10-24 @ 12:10) (16 - 18)  SpO2: 95% (07-10-24 @ 12:10) (94% - 100%)        GENERAL: AAOx3, no acute distress.  HEAD:  Atraumatic, Normocephalic  EYES: conjunctiva and sclera clear  NECK: Supple, no JVD or thyromegaly  CHEST/LUNG: Clear to auscultation bilaterally; No wheeze, rhonchi, or rales  HEART: Regular rate and rhythm; normal S1, S2, No murmurs.  ABDOMEN: Soft, nontender, nondistended; Bowel sounds present  NEUROLOGY: No asterixis or tremor.   SKIN: Intact, no jaundice     Data:    Hgb trend:            Liver panel trend:  TBili 0.5   /   AST 31   /   ALT 11   /   AlkP 78   /   Tptn 6.9   /   Alb 3.8    /   DBili --      07-05  TBili 0.5   /   AST 27   /   ALT 10   /   AlkP 75   /   Tptn 6.3   /   Alb 3.7    /   DBili --      07-04  TBili 0.6   /   AST 31   /   ALT 9   /   AlkP 78   /   Tptn 7.0   /   Alb 3.9    /   DBili --      07-03             Radiology:

## 2024-07-10 NOTE — PROGRESS NOTE ADULT - ASSESSMENT
Patient is a  57 y/o  male with Down's Syndrome and Dementia, no other pmh, presented to the ED with his mother because of abdominal pain and vomiting. Per mother, pt is nonverbal but he has been holding his abdomen for the past few weeks, and he recently lost 20lbs in the past four months despite having good appetite. According to the mother, pt also started having NBNB vomiting. Patient was scheduled for EGD/colono as OP but could not be done because pt could not drink the prep. Admitted for further investigation and management.    #Abdominal pain , Newly dx. Mesenteric Lesion  #Weight loss  #chronic constipation  - Pt nonverbal, but he has been holding his abdomen for the past few weeks  - 20lbs of weight loss over 4 months  - Scheduled for EGD/Colono OP, but could not be donne as pt unable to drink the prep.  - 7/4 CT A/P with oral and IV contrast: Lesion within the mesentery abutting the small bowel measuring approximately 2.9 x 2.7 cm which is indeterminate. Otherwise unremarkable.  No bowel obstruction.  - Previous CT A/P on 10/5/23 was negative.  - On miralax and senna  - MR abdomen scheduled for Today with anesthesia; make NPO at midnight  - GI waiting for MRI results too see if amendable to EUS evaluation plus EGD/colonoscopy inpatient   - regular diet as tolerated      DVT ppx: Lovenox  Acitivty: AAT

## 2024-07-10 NOTE — PRE-ANESTHESIA EVALUATION ADULT - NSANTHADDINFOFT_GEN_ALL_CORE
MAC planned, backup GA  Discussed risks, including dental injury and more serious complications including cardiac and pulmonary complications and stroke.  Patient expresses understanding with regard to risks of anesthesia and wishes to proceed.     7/11 pt to have repeat MRI due to MRI failure yesterday.  Pt status unchanged.

## 2024-07-10 NOTE — CHART NOTE - NSCHARTNOTEFT_GEN_A_CORE
Registered Dietitian Follow-Up     Patient Profile Reviewed                           Yes [x]   No []     Nutrition History Previously Obtained        Yes [x]  No []       Pertinent Subjective Information:  RD spoke with Patient's mother present at bedside. Patient is NPO this morning for procedure. But previously on clear liquids, his mother reports he had been tolerating them well.     Pertinent Medical Interventions:  Abodminal pain - newly dx mesenteric lesion; weight loss; chronic constipation; MRI abdomen schedule today with anesthesia  GI waiting for MRI results too see if amendable to EUS evaluation plus EGD / colonoscopy inpatient     Diet order:   Diet, NPO after Midnight:      NPO Start Date: 2024,   NPO Start Time: 23:59  Except Medications (24 @ 09:13) [Active]  Diet, Clear Liquid (24 @ 07:12) [Active]    Anthropometrics:  Height (cm): 157.5 (24 @ 11:15)  Weight: 63.5 kg   BMI: 25.6  IBW: 50 kg     Daily Weight in k.4 ()    MEDICATIONS  (STANDING):  chlorhexidine 2% Cloths 1 Application(s) Topical daily  enoxaparin Injectable 40 milliGRAM(s) SubCutaneous every 24 hours  polyethylene glycol 3350 17 Gram(s) Oral every 12 hours  senna 2 Tablet(s) Oral at bedtime    MEDICATIONS  (PRN):    Pertinent Labs:   Finger Sticks:    Physical Findings:  - Appearance: alert, nonverbal  - GI function: last BM    - Tubes: n/a - no feeding tubes   - Oral/Mouth cavity: NPO/Clears  - Skin: no pressure injuries documented  - Edema: no edema documented      Nutrition Requirements:  Weight Used: 63.5 kg      Estimated Energy Needs    Continue [x]  Adjust []  1348-1618kcal/day (using MSJ 1-1.2AF)      Estimated Protein Needs    Continue [x]  Adjust []  64-77g/day (1-1.2g/kg)      Estimated Fluid Needs        Continue [x]  Adjust []  1 mL/kcal      Nutrient Intake: NPO     [x] Previous Nutrition Diagnosis:  Inadequate Protein Energy Intake             [x] Ongoing          [] Resolved    Nutrition Education:  Discussed oral nutrition supplements and benefits     Nutrition Intervention:  meals and snacks, medical food supplements, coordination of care     Goal/Expected Outcome:   As medically feasible and tolerated by patient, advance diet to Regular within 3-5 days      Indicator/Monitoring:   diet order, labs, skin status, NFPF     Recommendation:   1) Advance diet per GI   -as medically feasible and tolerated by patient, would recommend to advance diet to Regular     2) If patient is advanced to clear liquids - would recommend Prosource Gelatein Plus 3x/day (160 kcal, 20 gm Protein each)   If diet advanced to full liquid or Regular diet, would recommend to add Ensure Plus HP 3x/day (350 kcal, 20 gm Protein each) to aid in optimizing kcal and protein intake     3) Monitor BM, GI s/s    Patient remains at high nutrition risk, RD to f/u in 3-5 days or PRN  RD to remain available: Melissa Box, [place] [phone] or via Teams

## 2024-07-10 NOTE — PROGRESS NOTE ADULT - SUBJECTIVE AND OBJECTIVE BOX
Patient is a 58y old Male who presents with a chief complaint of abdominal pain. Today is hospital day 7d. This morning patient was seen and examined at bedside, resting comfortably in bed. The pt is nonverbal at baseline. Per the mother, the pt hasn't had a vomiting episode and seems to be in less pain. The mother states that he urinated alot in the morning.   No acute or major events overnight.    Code Status:      PAST MEDICAL & SURGICAL HISTORY  Dementia    Down's syndrome    History of amblyopia      SOCIAL HISTORY:  Social History:      ALLERGIES:  penicillin (Rash)    MEDICATIONS:  STANDING MEDICATIONS  chlorhexidine 2% Cloths 1 Application(s) Topical daily  enoxaparin Injectable 40 milliGRAM(s) SubCutaneous every 24 hours  polyethylene glycol 3350 17 Gram(s) Oral every 12 hours  senna 2 Tablet(s) Oral at bedtime    PRN MEDICATIONS    VITALS:   T(F): 97.7  HR: 52  BP: 103/70  RR: 16  SpO2: 100%    PHYSICAL EXAM:  GENERAL: NAD, lying in bed comfortably  HEAD:  Atraumatic, Normocephalic  EYES: EOMI, PERRLA, conjunctiva and sclera clear  NECK: Supple, No JVD  CHEST/LUNG: Clear to auscultation bilaterally; No rales, rhonchi, wheezing, or rubs. Unlabored respirations  HEART: Regular rate and rhythm; No murmurs, rubs, or gallops  ABDOMEN: BSx4; Soft, nontender, nondistended  EXTREMITIES:  2+ Peripheral Pulses, brisk capillary refill. No clubbing, cyanosis, or edema  NERVOUS SYSTEM:  A&Ox3, no focal deficits   SKIN: No rashes or lesions

## 2024-07-10 NOTE — PROGRESS NOTE ADULT - ASSESSMENT
58 yr old male with Down's Syndrome and Dementia at average risk for CRC went to the ED on 10/5/23 for chronic RLQ pain. According to his mother, he is nonverbal but he has been holding his RLQ for the past year. She also stated that he has lost about 25 lbs in the past year. She stated that he has a good appetite and eats well. He has a long H/O constipation; he takes Senokot q day and Miralax without BMs q day. His last BM was 2 days ago. His mother stated he has not had any heartburn, blood in the stool, melena, vomiting, dysphagia. Patient was seen in clinic and was scheduled for EGD and colonoscopy but could not be done as he could not drink the prep.  CT scan of abdomen Lesion within the mesentery abutting the small bowel measuring approximately 2.9 x 2.7 cm which is indeterminate. no obstruction.     #Unintentional Weight loss/chronic RLQ Abdominal pain/ nausea, vomiting  #Lesion within the mesentery abutting the small bowel   #Chronic constipation  - lipase, lactate : normal  - CTAP: Lesion within the mesentery abutting the small bowel measuring approximately 2.9 x 2.7 cm which is indeterminate. Consider further characterization with MRI as clinically indicated.  - MRI pending  - phy exam benign  - no family hx of GI related malignancies, not on AC    RECS  - planning for inpatient EGD/Colonoscopy on Friday 7/12.   - Start patient on clear liquid diet   - Please begin 1/2 Golytely tonight. Then tomorrow 7/11 provide full Golytlely with dulcolax 20mg   - NPO midnight before procedure    - Pending MR abdomen for eval of mesenteric lesion. May pursue EUS guided biopsy vs IR   - cw Miralax TID, senna 2 tabs night  - Monitor BMs  - Recommend ambulation, frequent repositioning  - Keep on clear liquid diet  - Will follow

## 2024-07-11 PROCEDURE — 99233 SBSQ HOSP IP/OBS HIGH 50: CPT

## 2024-07-11 PROCEDURE — 74182 MRI ABDOMEN W/CONTRAST: CPT | Mod: 26

## 2024-07-11 RX ORDER — LORAZEPAM 0.5 MG
1 TABLET ORAL ONCE
Refills: 0 | Status: DISCONTINUED | OUTPATIENT
Start: 2024-07-11 | End: 2024-07-11

## 2024-07-11 RX ADMIN — Medication 1 APPLICATION(S): at 17:25

## 2024-07-11 RX ADMIN — POLYETHYLENE GLYCOL 3350 17 GRAM(S): 1 POWDER ORAL at 05:27

## 2024-07-11 RX ADMIN — Medication 2 TABLET(S): at 21:50

## 2024-07-11 RX ADMIN — POLYETHYLENE GLYCOL 3350 17 GRAM(S): 1 POWDER ORAL at 17:24

## 2024-07-11 RX ADMIN — Medication 2000 MILLILITER(S): at 05:27

## 2024-07-11 NOTE — PROVIDER CONTACT NOTE (FALL NOTIFICATION) - SITUATION
Patient had unwitnessed fall. no medical staff saw the fall. As per patient mother, patient slipped off from bed.

## 2024-07-11 NOTE — PROGRESS NOTE ADULT - ASSESSMENT
Patient is a  57 y/o  male with Down's Syndrome and Dementia, no other pmh, presented to the ED with his mother because of abdominal pain and vomiting. Per mother, pt is nonverbal but he has been holding his abdomen for the past few weeks, and he recently lost 20lbs in the past four months despite having good appetite. According to the mother, pt also started having NBNB vomiting. Patient was scheduled for EGD/colono as OP but could not be done because pt could not drink the prep. Admitted for further investigation and management.    #Abdominal pain , Newly dx. Mesenteric Lesion  #Weight loss  #chronic constipation  - Pt nonverbal, but he has been holding his abdomen for the past few weeks  - 20lbs of weight loss over 4 months  - Scheduled for EGD/Colono OP, but could not be donne as pt unable to drink the prep.  - 7/4 CT A/P with oral and IV contrast: Lesion within the mesentery abutting the small bowel measuring approximately 2.9 x 2.7 cm which is indeterminate. Otherwise unremarkable.  No bowel obstruction.  - Previous CT A/P on 10/5/23 was negative.  - On miralax and senna  - F/U MRI abdomen   - GI plans for inpt EGD/colonoscopy tomorrow; Golytlely tonight at 4PM. If not able to tolerate prep will need to use NGT.  - dulcolax 20mg @ 9pm  - NPO at midnight  - regular diet as tolerated      DVT ppx: Lovenox  Acitivty: AAT

## 2024-07-11 NOTE — PROGRESS NOTE ADULT - ATTENDING COMMENTS
fine   Patient is a  57 y/o  male with Down's Syndrome and Dementia, no other pmh, presented to the ED with his mother because of abdominal pain and vomiting. Per mother, pt is nonverbal but he has been holding his abdomen for the past few weeks, and he recently lost 20lbs in the past four months despite having good appetite. According to the mother, pt also started having NBNB vomiting. Patient was scheduled for EGD/colono as OP but could not be done because pt could not drink the prep. Admitted for further investigation and management.    #Abdominal pain , Newly dx. Mesenteric Lesion  #Weight loss  #chronic constipation  - Pt nonverbal, but he has been holding his abdomen for the past few weeks  - 20lbs of weight loss over 4 months  - Scheduled for EGD/Colono OP, but could not be donne as pt unable to drink the prep.  - 7/4 CT A/P with oral and IV contrast: Lesion within the mesentery abutting the small bowel measuring approximately 2.9 x 2.7 cm which is indeterminate. Otherwise unremarkable.  No bowel obstruction.  - Previous CT A/P on 10/5/23 was negative.  - On miralax and senna  - f/u MR abdomen. Needs sedation prior to MRI > Planned for 7/10. > Postponed to 7/11.   MRI Abdomen:   2.7 cm left retroperitoneal mass, probably arising from the adrenal   gland-suspicious for pheochromocytoma.    - GI also planning inpatient EGD/Colonoscopy on 7/12. Trying our best to have patient take golytely.  Family wants all investigations done.   - clear liquid diet today. NPO after midnight.     DVT ppx: Lovenox  Acitivty: AAT    Pending:  EGD/Colonoscopy    Dispo: home with caregiver once medically stable . Patient is a  57 y/o  male with Down's Syndrome and Dementia, no other pmh, presented to the ED with his mother because of abdominal pain and vomiting. Per mother, pt is nonverbal but he has been holding his abdomen for the past few weeks, and he recently lost 20lbs in the past four months despite having good appetite. According to the mother, pt also started having NBNB vomiting. Patient was scheduled for EGD/colono as OP but could not be done because pt could not drink the prep. Admitted for further investigation and management.    #Abdominal pain , Newly dx. Mesenteric Lesion  #Weight loss  #chronic constipation  - Pt nonverbal, but he has been holding his abdomen for the past few weeks  - 20lbs of weight loss over 4 months  - Scheduled for EGD/Colono OP, but could not be donne as pt unable to drink the prep.  - 7/4 CT A/P with oral and IV contrast: Lesion within the mesentery abutting the small bowel measuring approximately 2.9 x 2.7 cm which is indeterminate. Otherwise unremarkable.  No bowel obstruction.  - Previous CT A/P on 10/5/23 was negative.  - On miralax and senna  - f/u MR abdomen. Needs sedation prior to MRI > Planned for 7/10. > Postponed to 7/11.   MRI Abdomen:   2.7 cm left retroperitoneal mass, probably arising from the adrenal   gland-suspicious for pheochromocytoma.  Will check 24 hour urine for fractionated metanephrines and Plasma-free metanephrines level.   If suspected, consider Endocrine consult. Even if pheochromocytoma confirmed, likely incidental finding? Does not explain his abdominal pain?  BP currently on lower side.     - GI also planning inpatient EGD/Colonoscopy on 7/12. Trying our best to have patient take golytely.  Family wants all investigations done.   - clear liquid diet today. NPO after midnight.     DVT ppx: Lovenox  Acitivty: AAT    Pending:  EGD/Colonoscopy    Dispo: home with caregiver once medically stable .

## 2024-07-11 NOTE — PROGRESS NOTE ADULT - ASSESSMENT
58 yr old male with Down's Syndrome and Dementia at average risk for CRC went to the ED on 10/5/23 for chronic RLQ pain. According to his mother, he is nonverbal but he has been holding his RLQ for the past year. She also stated that he has lost about 25 lbs in the past year. She stated that he has a good appetite and eats well. He has a long H/O constipation; he takes Senokot q day and Miralax without BMs q day. His last BM was 2 days ago. His mother stated he has not had any heartburn, blood in the stool, melena, vomiting, dysphagia. Patient was seen in clinic and was scheduled for EGD and colonoscopy but could not be done as he could not drink the prep.  CT scan of abdomen Lesion within the mesentery abutting the small bowel measuring approximately 2.9 x 2.7 cm which is indeterminate. no obstruction.     #Unintentional Weight loss/chronic RLQ Abdominal pain/ nausea, vomiting  #Lesion within the mesentery abutting the small bowel   #Chronic constipation  - lipase, lactate : normal  - CTAP: Lesion within the mesentery abutting the small bowel measuring approximately 2.9 x 2.7 cm which is indeterminate. Consider further characterization with MRI as clinically indicated.  - MRI pending  - phy exam benign  - no family hx of GI related malignancies, not on AC    RECS  - planning for inpatient EGD/Colonoscopy on Friday 7/12.   - Start patient on clear liquid diet   - Please begin Golytlely tonight at 4PM. If not able to tolerate prep will need to use NGT. dulcolax 20mg @ 9PM  - NPO midnight before procedure    - Pending MR abdomen for eval of mesenteric lesion. May pursue EUS guided biopsy vs IR   - cw Miralax TID, senna 2 tabs night  - Monitor BMs  - Recommend ambulation, frequent repositioning  - Keep on clear liquid diet  - Will follow

## 2024-07-11 NOTE — PROGRESS NOTE ADULT - SUBJECTIVE AND OBJECTIVE BOX
Gastroenterology progress note:     Patient is a 58y old  Male who presents with a chief complaint of abdominal pain (10 Jul 2024 13:32)       Admitted on: 07-03-24    We are following the patient for:       Interval History:    No acute events overnight. Patient did not go for MR yesterday due to technical difficulties. Was not able to tolerate 1/2 prep.     PAST MEDICAL & SURGICAL HISTORY:  Dementia      Down's syndrome      History of amblyopia          MEDICATIONS  (STANDING):  chlorhexidine 2% Cloths 1 Application(s) Topical daily  enoxaparin Injectable 40 milliGRAM(s) SubCutaneous every 24 hours  polyethylene glycol 3350 17 Gram(s) Oral every 12 hours  senna 2 Tablet(s) Oral at bedtime    MEDICATIONS  (PRN):      Allergies  penicillin (Rash)      Review of Systems:   Cardiovascular:  No Chest Pain, No Palpitations  Respiratory:  No Cough, No Dyspnea  Gastrointestinal:  As described in HPI  Skin:  No Skin Lesions, No Jaundice  Neuro:  No Syncope, No Dizziness    Physical Examination:  T(C): 36.7 (07-11-24 @ 05:00), Max: 36.8 (07-10-24 @ 20:34)  HR: 83 (07-11-24 @ 05:00) (48 - 97)  BP: 101/63 (07-11-24 @ 05:00) (101/63 - 135/61)  RR: 18 (07-11-24 @ 05:00) (16 - 18)  SpO2: 100% (07-11-24 @ 05:00) (95% - 100%)  Weight (kg): 63.5 (07-10-24 @ 16:01)      GENERAL: AAOx3, no acute distress.  HEAD:  Atraumatic, Normocephalic  EYES: conjunctiva and sclera clear  NECK: Supple, no JVD or thyromegaly  CHEST/LUNG: Clear to auscultation bilaterally; No wheeze, rhonchi, or rales  HEART: Regular rate and rhythm; normal S1, S2, No murmurs.  ABDOMEN: Soft, nontender, nondistended; Bowel sounds present  NEUROLOGY: No asterixis or tremor.   SKIN: Intact, no jaundice     Data:    Hgb trend:            Liver panel trend:  TBili 0.5   /   AST 31   /   ALT 11   /   AlkP 78   /   Tptn 6.9   /   Alb 3.8    /   DBili --      07-05  TBili 0.5   /   AST 27   /   ALT 10   /   AlkP 75   /   Tptn 6.3   /   Alb 3.7    /   DBili --      07-04  TBili 0.6   /   AST 31   /   ALT 9   /   AlkP 78   /   Tptn 7.0   /   Alb 3.9    /   DBili --      07-03             Radiology:

## 2024-07-11 NOTE — PROVIDER CONTACT NOTE (FALL NOTIFICATION) - BACKGROUND
Patient 58y old male admitted to hospital due to abdominal pain. Patient has Down's syndrome and dementia.

## 2024-07-11 NOTE — CHART NOTE - NSCHARTNOTEFT_GEN_A_CORE
MR abdomen noted, 2.7 cm left retroperitoneal mass, probably arising from the adrenal gland-suspicious for pheochromocytoma.  Discussed with anesthesia, patient will need endocrine eval for further evaluation prior to anesthesia   Stop bowel prep  May resume diet for now   Will follow

## 2024-07-11 NOTE — PROGRESS NOTE ADULT - SUBJECTIVE AND OBJECTIVE BOX
Patient is a 58y old Male who presents with a chief complaint of abdominal pain. Today is hospital day 8d. This morning patient was seen and examined at bedside resting in bed.  The pt is nonverbal at baseline. Per the mother, the pt hasn't had a vomiting episode and seems to be in less pain. He had a small fall incident but no trauma was found.   No acute or major events overnight.    Code Status:      PAST MEDICAL & SURGICAL HISTORY  Dementia    Down's syndrome    History of amblyopia      SOCIAL HISTORY:  Social History:      ALLERGIES:  penicillin (Rash)    MEDICATIONS:  STANDING MEDICATIONS  chlorhexidine 2% Cloths 1 Application(s) Topical daily  enoxaparin Injectable 40 milliGRAM(s) SubCutaneous every 24 hours  polyethylene glycol 3350 17 Gram(s) Oral every 12 hours  senna 2 Tablet(s) Oral at bedtime    PRN MEDICATIONS    VITALS:   T(F): 97.7  HR: 60  BP: 105/60  RR: 16  SpO2: 100%      PHYSICAL EXAM:  GENERAL: NAD, lying in bed comfortably  HEAD:  Atraumatic, Normocephalic  EYES: EOMI, PERRLA, conjunctiva and sclera clear  NECK: Supple, No JVD  CHEST/LUNG: Clear to auscultation bilaterally; No rales, rhonchi, wheezing, or rubs. Unlabored respirations  HEART: Regular rate and rhythm; No murmurs, rubs, or gallops  ABDOMEN: BSx4; Soft, nontender, nondistended  EXTREMITIES:  2+ Peripheral Pulses, brisk capillary refill. No clubbing, cyanosis, or edema  NERVOUS SYSTEM:  A&Ox3, no focal deficits   SKIN: No rashes or lesions

## 2024-07-12 PROCEDURE — 99233 SBSQ HOSP IP/OBS HIGH 50: CPT

## 2024-07-12 PROCEDURE — 99232 SBSQ HOSP IP/OBS MODERATE 35: CPT

## 2024-07-12 RX ADMIN — Medication 1 APPLICATION(S): at 13:10

## 2024-07-12 RX ADMIN — POLYETHYLENE GLYCOL 3350 17 GRAM(S): 1 POWDER ORAL at 05:41

## 2024-07-12 NOTE — CONSULT NOTE ADULT - ATTENDING COMMENTS
59 y/o M w/ PMHx of Down's Syndrome and Dementia (nonverbal at baseline) presented to the ED with his mother because of abdominal pain. CT abdomen and pelvis was performed which showed an indeterminate lesion which was followed by an MRI of the abdomen which showed a 2.2X 2.7 cm left retroperitoneal mass suspicion of pheochromocytoma?    #Rule out pheochromocytoma ?   -Patient unable to provide a history however does not appear to have any clinical signs or symptoms of catecholamine producing tumor  -Blood pressure low normal, electrolytes within normal limits  -Recommend obtaining serum aldosterone and renin level, can evaluate for Cushing's outpatient in a nonacute setting  -Would recommend ruling out pheochromocytoma with a serum metanephrine level, however clinical suspicion is overall low  -Since BP is on the lower end, and he came in with nausea and vomiting, can obtain an a.m. cortisol  -If serum metanephrines are within normal limits can proceed with GI procedure  -Overall if there is concern for lipid poor adrenal lesion may benefit from eventual surgical intervention for the same

## 2024-07-12 NOTE — PROGRESS NOTE ADULT - SUBJECTIVE AND OBJECTIVE BOX
S: No new events/complaoints  comfortably sitting in chair  Ate breakfast.      All other pertinent ROS negative.      07-11-24 @ 07:01  -  07-12-24 @ 07:00  --------------------------------------------------------  IN: 0 mL / OUT: 100 mL / NET: -100 mL    07-12-24 @ 07:01  -  07-12-24 @ 17:20  --------------------------------------------------------  IN: 400 mL / OUT: 0 mL / NET: 400 mL      Vital Signs Last 24 Hrs  T(C): 36.4 (12 Jul 2024 13:00), Max: 37.3 (12 Jul 2024 05:23)  T(F): 97.6 (12 Jul 2024 13:00), Max: 99.2 (12 Jul 2024 05:23)  HR: 75 (12 Jul 2024 13:00) (57 - 75)  BP: 92/58 (12 Jul 2024 13:00) (84/47 - 125/74)  BP(mean): --  RR: 17 (12 Jul 2024 13:00) (17 - 17)  SpO2: 100% (12 Jul 2024 13:00) (98% - 100%)      PHYSICAL EXAM:    Constitutional: NAD, awake and alert  HEENT: PERR, EOMI, Normal Hearing, MMM  Neck: Soft and supple, No LAD, No JVD  Respiratory: Breath sounds are clear bilaterally, No wheezing, rales or rhonchi  Cardiovascular: S1 and S2, regular rate and rhythm, no Murmurs, gallops or rubs  Gastrointestinal: Bowel Sounds present, soft, nontender, nondistended, no guarding, no rebound  Extremities: No peripheral edema      MEDICATIONS:  MEDICATIONS  (STANDING):  chlorhexidine 2% Cloths 1 Application(s) Topical daily  enoxaparin Injectable 40 milliGRAM(s) SubCutaneous every 24 hours  polyethylene glycol 3350 17 Gram(s) Oral every 12 hours  senna 2 Tablet(s) Oral at bedtime      LABS: All Labs Reviewed:                Blood Culture:     Radiology: reviewed

## 2024-07-12 NOTE — PROGRESS NOTE ADULT - SUBJECTIVE AND OBJECTIVE BOX
Gastroenterology progress note:     Patient is a 58y old  Male who presents with a chief complaint of abdominal pain (12 Jul 2024 08:30)       Admitted on: 07-03-24    We are following the patient for:       Interval History:    No acute events overnight. Patient found to have concern for pheochromocytoma. Anesthesia requesting endocrine eval prior to procedure.       PAST MEDICAL & SURGICAL HISTORY:  Dementia      Down's syndrome      History of amblyopia          MEDICATIONS  (STANDING):  chlorhexidine 2% Cloths 1 Application(s) Topical daily  enoxaparin Injectable 40 milliGRAM(s) SubCutaneous every 24 hours  polyethylene glycol 3350 17 Gram(s) Oral every 12 hours  senna 2 Tablet(s) Oral at bedtime    MEDICATIONS  (PRN):      Allergies  penicillin (Rash)      Review of Systems:   unable to obtain due to mental status     Physical Examination:  T(C): 36.4 (07-12-24 @ 13:00), Max: 37.3 (07-12-24 @ 05:23)  HR: 75 (07-12-24 @ 13:00) (57 - 75)  BP: 92/58 (07-12-24 @ 13:00) (84/47 - 125/74)  RR: 17 (07-12-24 @ 13:00) (17 - 17)  SpO2: 100% (07-12-24 @ 13:00) (98% - 100%)      07-11-24 @ 07:01  -  07-12-24 @ 07:00  --------------------------------------------------------  IN: 0 mL / OUT: 100 mL / NET: -100 mL    07-12-24 @ 07:01  -  07-12-24 @ 14:56  --------------------------------------------------------  IN: 400 mL / OUT: 0 mL / NET: 400 mL        GENERAL: AAOx0, no acute distress.  HEAD:  Atraumatic, Normocephalic  EYES: conjunctiva and sclera clear  NECK: Supple, no JVD or thyromegaly  CHEST/LUNG: Clear to auscultation bilaterally; No wheeze, rhonchi, or rales  HEART: Regular rate and rhythm; normal S1, S2, No murmurs.  ABDOMEN: Soft, nontender, nondistended; Bowel sounds present  NEUROLOGY: No asterixis or tremor.   SKIN: Intact, no jaundice     Data:    Hgb trend:              Liver panel trend:  TBili 0.5   /   AST 31   /   ALT 11   /   AlkP 78   /   Tptn 6.9   /   Alb 3.8    /   DBili --      07-05  TBili 0.5   /   AST 27   /   ALT 10   /   AlkP 75   /   Tptn 6.3   /   Alb 3.7    /   DBili --      07-04  TBili 0.6   /   AST 31   /   ALT 9   /   AlkP 78   /   Tptn 7.0   /   Alb 3.9    /   DBili --      07-03             Radiology:      MR Abdomen w/ IV Cont:   ACC: [id number] EXAM:  MR ABDOMEN IC   ORDERED BY: PIERRE SOLIZ     PROCEDURE DATE:  07/11/2024          INTERPRETATION:  CLINICAL INFORMATION: Mesenteric lesion    COMPARISON: None. Correlation made with CT abdomen pelvis on 7/3/2024.    CONTRAST/COMPLICATIONS:  IV Contrast: Gadavist  10 cc administered   0 cc discarded  Oral Contrast: NONE  Complications: None reported at time of study completion    PROCEDURE:  MRI of the abdomen was performed.      FINDINGS:  LOWER CHEST: Bibasilar atelectasis/consolidation.    LIVER: Few scattered hepatic cysts.  BILE DUCTS: Normal caliber.  GALLBLADDER: Within normal limits.  SPLEEN: Within normal limits.  PANCREAS: Within normal limits.  ADRENALS: 2.7 x 2.4 cm left retroperitoneal mass, probably arising from   the adrenal gland demonstrates areas of T2 hyperintensity and   postcontrast enhancement.  KIDNEYS/URETERS: Within normal limits.    VISUALIZED PORTIONS:  BOWEL: Within normal limits.  PERITONEUM: No ascites.  VESSELS: Within normal limits.  RETROPERITONEUM/LYMPH NODES: No lymphadenopathy.  ABDOMINAL WALL: Within normal limits.  BONES: Within normal limits.    IMPRESSION:  2.7 cm left retroperitoneal mass, probably arising from the adrenal   gland-suspicious for pheochromocytoma.        ---End of Report ---            JADEN ROCHE MD; Attending Radiologist  This document has been electronically signed. Jul 11 2024  3:24PM (07-11-24 @ 13:49)

## 2024-07-12 NOTE — PROGRESS NOTE ADULT - TIME BILLING
Coordination of care

## 2024-07-12 NOTE — PROGRESS NOTE ADULT - ASSESSMENT
Patient is a  57 y/o  male with Down's Syndrome and Dementia, no other pmh, presented to the ED with his mother because of abdominal pain and vomiting. Per mother, pt is nonverbal but he has been holding his abdomen for the past few weeks, and he recently lost 20lbs in the past four months despite having good appetite. According to the mother, pt also started having NBNB vomiting. Patient was scheduled for EGD/colono as OP but could not be done because pt could not drink the prep. Admitted for further investigation and management.    #Abdominal pain , Newly dx. Mesenteric Lesion  #Weight loss  #chronic constipation  - Pt nonverbal, but he has been holding his abdomen for the past few weeks  - 20lbs of weight loss over 4 months  - Scheduled for EGD/Colono OP, but could not be donne as pt unable to drink the prep.  - 7/4 CT A/P with oral and IV contrast: Lesion within the mesentery abutting the small bowel measuring approximately 2.9 x 2.7 cm which is indeterminate. Otherwise unremarkable.  No bowel obstruction.  - Previous CT A/P on 10/5/23 was negative.  - On miralax and senna  - f/u MR abdomen. Needs sedation prior to MRI > Planned for 7/10. > Postponed to 7/11.   MRI Abdomen:   2.7 cm left retroperitoneal mass, probably arising from the adrenal   gland-suspicious for pheochromocytoma.    Family denies any pheochromcytoma like paroxysms of sweating/pallor/palpitations.   Endocrine eval noted. Will check 24 hour urine for fractionated metanephrines and Plasma-free metanephrines level.   Even if pheochromocytoma confirmed, likely incidental finding? Does not explain his abdominal pain?  If not pheochromocytoma, need to workup for other differentials of Adrenal mass.    GI was planning inpatient EGD/Colonoscopy to look for any explanation for Abdominal pain & Weight loss. But will need endocrine clearance first for anesthesia.   c/w diet for now.   Miralax, Senna    DVT ppx: Lovenox  Acitivty: AAT    Pending:  Endocrine clearance then EGD/Colonoscopy    Dispo: home with caregiver once medically stable .

## 2024-07-12 NOTE — CONSULT NOTE ADULT - SUBJECTIVE AND OBJECTIVE BOX
HISTORY OF PRESENT ILLNESS  59 y/o M w/ PMHx of Down's Syndrome and Dementia (nonverbal at baseline) presented to the ED with his mother because of abdominal pain and vomiting. Per mother, pt is nonverbal but he has been holding his abdomen for the past few weeks, and he recently lost 20lbs in the past four months despite having good appetite. According to the mother, pt also had NBNB vomiting yesterday. Of note pt was scheduled for EGD/colono as OP but could not be done because pt could not drink the prep.    In the ED, VS stable, labs unremarkable.  CT A/P showed a lesion within the mesentery abutting the small bowel measuring approximately 2.9 x 2.7 cm which is indeterminate. Otherwise unremarkable.    Patient admitted for further managemen    Endocrinology has been consulted for the suspected pheochromocytoma.       FAMILY HISTORY  - Diabetes:  - Thyroid:  - Autoimmune:  - Other:    SOCIAL HISTORY  - Work:  - Alcohol:  - Smoking:  - Recreational Drugs:    ALLERGIES  penicillin (Rash)      CURRENT MEDICATIONS  chlorhexidine 2% Cloths 1 Application(s) Topical daily  enoxaparin Injectable 40 milliGRAM(s) SubCutaneous every 24 hours  polyethylene glycol 3350 17 Gram(s) Oral every 12 hours  senna 2 Tablet(s) Oral at bedtime      REVIEW OF SYSTEMS  Constitutional:  Negative fever, chills or loss of appetite.  Eyes:  Negative blurry vision or double vision.  Cardiovascular:  Negative for chest pain or palpitations.  Respiratory:  Negative for cough, wheezing, or shortness of breath.   Gastrointestinal:  Negative for nausea, vomiting, diarrhea, constipation, or abdominal pain.  Genitourinary:  Negative frequency, urgency or dysuria.  Neurologic:  No headache, confusion, dizziness, lightheadedness.    PHYSICAL EXAM  Vital Signs Last 24 Hrs  T(C): 37.3 (12 Jul 2024 05:23), Max: 37.3 (12 Jul 2024 05:23)  T(F): 99.2 (12 Jul 2024 05:23), Max: 99.2 (12 Jul 2024 05:23)  HR: 57 (12 Jul 2024 05:23) (54 - 69)  BP: 125/74 (12 Jul 2024 05:40) (84/47 - 125/74)  BP(mean): --  RR: 16 (11 Jul 2024 14:35) (16 - 18)  SpO2: 100% (12 Jul 2024 05:23) (98% - 100%)    Parameters below as of 11 Jul 2024 12:18  Patient On (Oxygen Delivery Method): room air    Constitutional: Awake, alert, in no acute distress.   HEENT: Normocephalic, atraumatic, GARETT, no proptosis or lid retraction.   Neck: supple, no acanthosis, no thyromegaly or palpable thyroid nodules.  Respiratory: Lungs clear to ausculation bilaterally.   Cardiovascular: regular rhythm, normal S1 and S2, no audible murmurs.   GI: soft, non-tender, non-distended, bowel sounds present, no masses appreciated.  Extremities: No lower extremity edema, peripheral pulses present.   Skin: no rashes.   Psychiatric: AAO x 3. Normal affect/mood.     LABS  CBC - WBC/HGB/HTC/PLT: 5.21/14.5/42.0/226 (07-05-24)  BMP: Na/K/Cl/Bicarb/BUN/Cr/Gluc: 143/4.0/106/22/9/0.7/92 (07-05-24)  Anion Gap: 15 (07-05-24)  eGFR: 108 (07-05-24)  Calcium: 8.8 (07-05-24)  Phosphorus: 3.4 (07-05-24)  Magnesium: 1.9 (07-05-24)  LFT - Alb/Tprot/Tbili/Dbili/AlkPhos/ALT/AST: 3.8/--/0.5/--/78/11/31 (07-05-24)      CBC - WBC/HGB/HTC/PLT: 5.21/14.5/42.0/226 (07-05-24)BMP: Na/K/Cl/Bicarb/BUN/Cr/Gluc: 143/4.0/106/22/9/0.7/92 (07-05-24)  Anion Gap: 15 (07-05-24)  eGFR: 108 (07-05-24)  Calcium: 8.8 (07-05-24)  Phosphorus: 3.4 (07-05-24)  Magnesium: 1.9 (07-05-24)    CAPILLARY BLOOD GLUCOSE & INSULIN RECEIVED        07-11-24 @ 07:01  -  07-12-24 @ 07:00  --------------------------------------------------------  IN: 0 mL / OUT: 100 mL / NET: -100 mL      A1C: BUN: 9  Creatinine: 0.7  GFR: 108  Weight: 63.5  BMI: 25.6 HISTORY OF PRESENT ILLNESS  59 y/o M w/ PMHx of Down's Syndrome and Dementia (nonverbal at baseline) presented to the ED with his mother because of abdominal pain and vomiting. Per mother, pt is nonverbal but he has been holding his abdomen for the past few weeks, and he recently lost 20lbs in the past four months despite having good appetite. According to the mother, pt also had NBNB vomiting yesterday. Of note pt was scheduled for EGD/colono as OP but could not be done because pt could not drink the prep.    In the ED, VS stable, labs unremarkable.  CT A/P showed a lesion within the mesentery abutting the small bowel measuring approximately 2.9 x 2.7 cm which is indeterminate. Otherwise unremarkable.    Patient admitted for further management    Endocrinology has been consulted for the suspected pheochromocytoma.       FAMILY HISTORY  - Diabetes:  - Thyroid:  - Autoimmune:  - Other:    SOCIAL HISTORY  - Work:  - Alcohol:  - Smoking:  - Recreational Drugs:    ALLERGIES  penicillin (Rash)      CURRENT MEDICATIONS  chlorhexidine 2% Cloths 1 Application(s) Topical daily  enoxaparin Injectable 40 milliGRAM(s) SubCutaneous every 24 hours  polyethylene glycol 3350 17 Gram(s) Oral every 12 hours  senna 2 Tablet(s) Oral at bedtime      REVIEW OF SYSTEMS  Constitutional:  Negative fever, chills or loss of appetite.  Eyes:  Negative blurry vision or double vision.  Cardiovascular:  Negative for chest pain or palpitations.  Respiratory:  Negative for cough, wheezing, or shortness of breath.   Gastrointestinal:  Negative for nausea, vomiting, diarrhea, constipation, or abdominal pain.  Genitourinary:  Negative frequency, urgency or dysuria.  Neurologic:  No headache, confusion, dizziness, lightheadedness.    PHYSICAL EXAM  Vital Signs Last 24 Hrs  T(C): 37.3 (12 Jul 2024 05:23), Max: 37.3 (12 Jul 2024 05:23)  T(F): 99.2 (12 Jul 2024 05:23), Max: 99.2 (12 Jul 2024 05:23)  HR: 57 (12 Jul 2024 05:23) (54 - 69)  BP: 125/74 (12 Jul 2024 05:40) (84/47 - 125/74)  BP(mean): --  RR: 16 (11 Jul 2024 14:35) (16 - 18)  SpO2: 100% (12 Jul 2024 05:23) (98% - 100%)    Parameters below as of 11 Jul 2024 12:18  Patient On (Oxygen Delivery Method): room air    Constitutional: Awake, alert, in no acute distress.   HEENT: Normocephalic, atraumatic, GARETT, no proptosis or lid retraction.   Neck: supple, no acanthosis, no thyromegaly or palpable thyroid nodules.  Respiratory: Lungs clear to ausculation bilaterally.   Cardiovascular: regular rhythm, normal S1 and S2, no audible murmurs.   GI: soft, non-tender, non-distended, bowel sounds present, no masses appreciated.  Extremities: No lower extremity edema, peripheral pulses present.   Skin: no rashes.   Psychiatric: AAO x 3. Normal affect/mood.     LABS  CBC - WBC/HGB/HTC/PLT: 5.21/14.5/42.0/226 (07-05-24)  BMP: Na/K/Cl/Bicarb/BUN/Cr/Gluc: 143/4.0/106/22/9/0.7/92 (07-05-24)  Anion Gap: 15 (07-05-24)  eGFR: 108 (07-05-24)  Calcium: 8.8 (07-05-24)  Phosphorus: 3.4 (07-05-24)  Magnesium: 1.9 (07-05-24)  LFT - Alb/Tprot/Tbili/Dbili/AlkPhos/ALT/AST: 3.8/--/0.5/--/78/11/31 (07-05-24)      CBC - WBC/HGB/HTC/PLT: 5.21/14.5/42.0/226 (07-05-24)BMP: Na/K/Cl/Bicarb/BUN/Cr/Gluc: 143/4.0/106/22/9/0.7/92 (07-05-24)  Anion Gap: 15 (07-05-24)  eGFR: 108 (07-05-24)  Calcium: 8.8 (07-05-24)  Phosphorus: 3.4 (07-05-24)  Magnesium: 1.9 (07-05-24)    CAPILLARY BLOOD GLUCOSE & INSULIN RECEIVED        07-11-24 @ 07:01  -  07-12-24 @ 07:00  --------------------------------------------------------  IN: 0 mL / OUT: 100 mL / NET: -100 mL      A1C: BUN: 9  Creatinine: 0.7  GFR: 108  Weight: 63.5  BMI: 25.6 HISTORY OF PRESENT ILLNESS  57 y/o M w/ PMHx of Down's Syndrome and Dementia (nonverbal at baseline) presented to the ED with his mother because of abdominal pain and vomiting. Per mother, pt is nonverbal but he has been holding his abdomen for the past few weeks, and he recently lost 20lbs in the past four months despite having good appetite. According to the mother, pt also had NBNB vomiting yesterday. Of note pt was scheduled for EGD/colono as OP but could not be done because pt could not drink the prep.    In the ED, VS stable, labs unremarkable.  CT A/P showed a lesion within the mesentery abutting the small bowel measuring approximately 2.9 x 2.7 cm which is indeterminate. Otherwise unremarkable.    Patient admitted for further management    Endocrinology has been consulted for the suspected pheochromocytoma.       ALLERGIES  penicillin (Rash)      CURRENT MEDICATIONS  chlorhexidine 2% Cloths 1 Application(s) Topical daily  enoxaparin Injectable 40 milliGRAM(s) SubCutaneous every 24 hours  polyethylene glycol 3350 17 Gram(s) Oral every 12 hours  senna 2 Tablet(s) Oral at bedtime      REVIEW OF SYSTEMS  Constitutional:  Negative fever, chills or loss of appetite.  Eyes:  Negative blurry vision or double vision.  Cardiovascular:  Negative for chest pain or palpitations.  Respiratory:  Negative for cough, wheezing, or shortness of breath.   Gastrointestinal:  Negative for nausea, vomiting, diarrhea, constipation, or abdominal pain.  Genitourinary:  Negative frequency, urgency or dysuria.  Neurologic:  No headache, confusion, dizziness, lightheadedness.    PHYSICAL EXAM  Vital Signs Last 24 Hrs  T(C): 37.3 (12 Jul 2024 05:23), Max: 37.3 (12 Jul 2024 05:23)  T(F): 99.2 (12 Jul 2024 05:23), Max: 99.2 (12 Jul 2024 05:23)  HR: 57 (12 Jul 2024 05:23) (54 - 69)  BP: 125/74 (12 Jul 2024 05:40) (84/47 - 125/74)  BP(mean): --  RR: 16 (11 Jul 2024 14:35) (16 - 18)  SpO2: 100% (12 Jul 2024 05:23) (98% - 100%)    Parameters below as of 11 Jul 2024 12:18  Patient On (Oxygen Delivery Method): room air    Constitutional: Awake, alert, in no acute distress.   HEENT: Normocephalic, atraumatic, GARETT, no proptosis or lid retraction.   Neck: supple, no acanthosis, no thyromegaly or palpable thyroid nodules.  Respiratory: Lungs clear to ausculation bilaterally.   Cardiovascular: regular rhythm, normal S1 and S2, no audible murmurs.   GI: soft, non-tender, non-distended, bowel sounds present, no masses appreciated.  Extremities: No lower extremity edema, peripheral pulses present.   Skin: no rashes.   Psychiatric: AAO x 3. Normal affect/mood.     LABS  CBC - WBC/HGB/HTC/PLT: 5.21/14.5/42.0/226 (07-05-24)  BMP: Na/K/Cl/Bicarb/BUN/Cr/Gluc: 143/4.0/106/22/9/0.7/92 (07-05-24)  Anion Gap: 15 (07-05-24)  eGFR: 108 (07-05-24)  Calcium: 8.8 (07-05-24)  Phosphorus: 3.4 (07-05-24)  Magnesium: 1.9 (07-05-24)  LFT - Alb/Tprot/Tbili/Dbili/AlkPhos/ALT/AST: 3.8/--/0.5/--/78/11/31 (07-05-24)      CBC - WBC/HGB/HTC/PLT: 5.21/14.5/42.0/226 (07-05-24)BMP: Na/K/Cl/Bicarb/BUN/Cr/Gluc: 143/4.0/106/22/9/0.7/92 (07-05-24)  Anion Gap: 15 (07-05-24)  eGFR: 108 (07-05-24)  Calcium: 8.8 (07-05-24)  Phosphorus: 3.4 (07-05-24)  Magnesium: 1.9 (07-05-24)    CAPILLARY BLOOD GLUCOSE & INSULIN RECEIVED        07-11-24 @ 07:01  -  07-12-24 @ 07:00  --------------------------------------------------------  IN: 0 mL / OUT: 100 mL / NET: -100 mL      A1C: BUN: 9  Creatinine: 0.7  GFR: 108  Weight: 63.5  BMI: 25.6

## 2024-07-12 NOTE — PROGRESS NOTE ADULT - ASSESSMENT
58 yr old male with Down's Syndrome and Dementia at average risk for CRC went to the ED on 10/5/23 for chronic RLQ pain. According to his mother, he is nonverbal but he has been holding his RLQ for the past year. She also stated that he has lost about 25 lbs in the past year. She stated that he has a good appetite and eats well. He has a long H/O constipation; he takes Senokot q day and Miralax without BMs q day. His last BM was 2 days ago. His mother stated he has not had any heartburn, blood in the stool, melena, vomiting, dysphagia. Patient was seen in clinic and was scheduled for EGD and colonoscopy but could not be done as he could not drink the prep.  CT scan of abdomen Lesion within the mesentery abutting the small bowel measuring approximately 2.9 x 2.7 cm which is indeterminate. no obstruction.     #Unintentional Weight loss/chronic RLQ Abdominal pain/ nausea, vomiting  #Lesion within the mesentery abutting the small bowel   #Chronic constipation  - lipase, lactate : normal  - CTAP: Lesion within the mesentery abutting the small bowel measuring approximately 2.9 x 2.7 cm which is indeterminate. Consider further characterization with MRI as clinically indicated.  - MRI concerning for pheochromocytoma   - phy exam benign  - no family hx of GI related malignancies, not on AC    RECS  - pending Endocrine eval of adrenal mass      - plan for EGD/colonosocpy next week  - cw Miralax TID, senna 2 tabs night  - Monitor BMs  - Recommend ambulation, frequent repositioning  - Will follow

## 2024-07-12 NOTE — CONSULT NOTE ADULT - ASSESSMENT
ASSESSMENT / RECOMMENDATIONS    59 y/o M w/ PMHx of Down's Syndrome and Dementia (nonverbal at baseline) presented to the ED with his mother because of abdominal pain and vomiting. CTAP showed showed a lesion within the mesentery abutting the small bowel measuring approximately 2.9 x 2.7 cm which is indeterminate. Otherwise unremarkable. MR Abdomen subsequently obtained which 2.7 cm left retroperitoneal mass, probably arising from the adrenal gland-suspicious for pheochromocytoma. Endocrinology consulted for clearance for anesthesia given MR findings.     #R/o pheochromocytoma   - Presented w/ abd pain, NBNB emesis and weight loss  - CTAP w/ PO & IVC (7/3/24): Lesion within the mesentery abutting the small bowel measuring approximately 2.9 x 2.7 cm which is indeterminate. Consider further characterization with MRI as clinically indicated.  - MR Abdomen w/ IVC (7/11/24):  2.7 cm left retroperitoneal mass, probably arising from the adrenal gland-suspicious for pheochromocytoma  - Unable to obtain history as patient nonverbal at baseline  - BP and HR have been on the lower side based on flow sheet which goes against pheo  Plan:  - Check serum & urine metanephrine  - Check serum & urine catecholamine fractionation  - Avoid BB, glucagon, histamine, metoclopramide, & high-dose corticosteroids pending r/o pheo  - If pheochromocytoma is confirmed, will need surgical intervention and alpha-blockade prior to intervention  - May need a diagnostic CT-guided FNA biopsy to r/o malignancy, ONLY AFTER PHEOCHROMOCYTOMA IS RULED OUT    *****INCOMPLETE NOTE*****  Case discussed with Dr. Camarena. Primary team updated.      ASSESSMENT / RECOMMENDATIONS    57 y/o M w/ PMHx of Down's Syndrome and Dementia (nonverbal at baseline) presented to the ED with his mother because of abdominal pain and vomiting. CTAP showed showed a lesion within the mesentery abutting the small bowel measuring approximately 2.9 x 2.7 cm which is indeterminate. Otherwise unremarkable. MR Abdomen subsequently obtained which 2.7 cm left retroperitoneal mass, probably arising from the adrenal gland-suspicious for pheochromocytoma. Endocrinology consulted for clearance for anesthesia given MR findings.     #Adrenal incidentaloma  #R/o pheochromocytoma   - Presented w/ abd pain, NBNB emesis and weight loss  - CTAP w/ PO & IVC (7/3/24): Lesion within the mesentery abutting the small bowel measuring approximately 2.9 x 2.7 cm which is indeterminate. Consider further characterization with MRI as clinically indicated.  - MR Abdomen w/ IVC (7/11/24):  2.7 cm left retroperitoneal mass, probably arising from the adrenal gland-suspicious for pheochromocytoma  - Unable to obtain history as patient nonverbal at baseline  - BP and HR have been on the lower side based on flow sheet which goes against pheochromocytoma   - Overall, clinical suspicion for pheochromocytoma is extremely low; more concerning for adrenocortical carcinoma    Plan:  - F/u serum metanephrine- specimen received  - Check serum aldosterone/renin ratio, AM cortisol   - Low clinical suspicion for cushing, no need for 24 hour urine cortisol or 1 mg dexamethasone suppression test  - Avoid BB, glucagon, histamine & metoclopramide pending pheo r/o  - May need surgical intervention to r/o malignancy, after pheochromocytoma is ruled out, if aligns w/ [facility]    Case discussed with Dr. Camarena. Primary team updated.      ASSESSMENT / RECOMMENDATIONS    59 y/o M w/ PMHx of Down's Syndrome and Dementia (nonverbal at baseline) presented to the ED with his mother because of abdominal pain and vomiting. CTAP showed showed a lesion within the mesentery abutting the small bowel measuring approximately 2.9 x 2.7 cm which is indeterminate. Otherwise unremarkable. MR Abdomen subsequently obtained which 2.7 cm left retroperitoneal mass, probably arising from the adrenal gland-suspicious for pheochromocytoma. Endocrinology consulted for clearance for anesthesia given MR findings.     #Adrenal incidentaloma  #R/o pheochromocytoma   - Presented w/ abd pain, NBNB emesis and weight loss  - CTAP w/ PO & IVC (7/3/24): Lesion within the mesentery abutting the small bowel measuring approximately 2.9 x 2.7 cm which is indeterminate. Consider further characterization with MRI as clinically indicated.  - MR Abdomen w/ IVC (7/11/24):  2.7 cm left retroperitoneal mass, probably arising from the adrenal gland-suspicious for pheochromocytoma  - Unable to obtain history as patient nonverbal at baseline  - BP and HR have been on the lower side based on flow sheet which goes against pheochromocytoma   - Overall, clinical suspicion for pheochromocytoma is extremely low; more concerning for adrenocortical carcinoma    Plan:  - F/u serum metanephrine- specimen received  - Check serum aldosterone/renin ratio, AM cortisol   - Low clinical suspicion for cushing, no need for 24 hour urine cortisol or 1 mg dexamethasone suppression test  - Avoid BB, glucagon, histamine & metoclopramide pending pheo r/o    Case discussed with Dr. Camarena. Primary team updated.

## 2024-07-13 PROCEDURE — 99233 SBSQ HOSP IP/OBS HIGH 50: CPT

## 2024-07-13 RX ADMIN — ENOXAPARIN SODIUM 40 MILLIGRAM(S): 100 INJECTION SUBCUTANEOUS at 11:03

## 2024-07-13 RX ADMIN — POLYETHYLENE GLYCOL 3350 17 GRAM(S): 1 POWDER ORAL at 17:34

## 2024-07-13 RX ADMIN — Medication 1 APPLICATION(S): at 11:03

## 2024-07-13 NOTE — PROGRESS NOTE ADULT - ASSESSMENT
57 y/o  male with Down's Syndrome and Dementia, no other pmh, presented to the ED with his mother because of abdominal pain and vomiting. Per mother, pt is nonverbal but he has been holding his abdomen for the past few weeks, and he recently lost 20lbs in the past four months despite having good appetite.  According to the mother, pt also started having NBNB vomiting. Patient was scheduled for EGD/colono as OP but could not be done because pt could not drink the prep. Admitted for further investigation and management.    #Abdominal pain , Newly dx. Mesenteric Lesion  #Weight loss   #chronic constipation  - Pt nonverbal, but he has been holding his abdomen for the past few weeks  - 20lbs of weight loss over 4 months  - Scheduled for EGD/Colono OP, but could not be done as pt unable to drink the prep.  - 7/4 CT A/P with oral and IV contrast: Lesion within the mesentery abutting the small bowel measuring approximately 2.9 x 2.7 cm which is indeterminate. Otherwise unremarkable.  No bowel obstruction.  - Previous CT A/P on 10/5/23 was negative.  - On miralax and senna   MRI Abdomen:   2.7 cm left retroperitoneal mass, probably arising from the adrenal   gland-suspicious for pheochromocytoma.    Family denies any pheochromcytoma like paroxysms of sweating/pallor/palpitations.   Endocrine eval noted. Will check 24 hour urine for fractionated metanephrines and Plasma-free metanephrines level.   Even if pheochromocytoma confirmed, likely incidental finding? Does not explain his abdominal pain?  If not pheochromocytoma, need to workup for other differentials of Adrenal mass.    GI was planning inpatient EGD/Colonoscopy to look for any explanation for Abdominal pain & Weight loss.  c/w diet for now.   Miralax, Senna    Pending:  Endocrine eval,  EGD/Colonoscopy    Dispo: home with caregiver once medically stable .

## 2024-07-13 NOTE — PROGRESS NOTE ADULT - SUBJECTIVE AND OBJECTIVE BOX
TANYA SANCHEZ 58y Male  MRN#: [id number]       SUBJECTIVE  Patient is a 58y old Male who presents with a chief complaint of abdominal pain (12 Jul 2024 17:19)  Currently admitted to medicine with the primary diagnosis of Abdominal pain  no overnight events     OBJECTIVE  PAST MEDICAL & SURGICAL HISTORY  Dementia    Down's syndrome    History of amblyopia      ALLERGIES:  penicillin (Rash)    MEDICATIONS:  STANDING MEDICATIONS  chlorhexidine 2% Cloths 1 Application(s) Topical daily  enoxaparin Injectable 40 milliGRAM(s) SubCutaneous every 24 hours  polyethylene glycol 3350 17 Gram(s) Oral every 12 hours  senna 2 Tablet(s) Oral at bedtime    PRN MEDICATIONS      VITAL SIGNS: Last 24 Hours  T(C): 36.3 (13 Jul 2024 04:50), Max: 37.2 (12 Jul 2024 20:39)  T(F): 97.4 (13 Jul 2024 04:50), Max: 99 (12 Jul 2024 20:39)  HR: 50 (13 Jul 2024 04:50) (50 - 76)  BP: 104/67 (13 Jul 2024 04:50) (92/58 - 108/59)  BP(mean): --  RR: 19 (13 Jul 2024 04:50) (17 - 19)  SpO2: 95% (13 Jul 2024 04:50) (95% - 100%)    PHYSICAL EXAM:    GENERAL: not in distress  HEENT:  No JVD  PULMONARY: Clear to auscultation bilaterally  CARDIOVASCULAR: Regular rate and rhythm  GASTROINTESTINAL: Soft, Nontender, Nondistended  MUSCULOSKELETAL:  No clubbing, cyanosis, or edema  NEUROLOGY: AO*0  SKIN: No rashes or lesions

## 2024-07-14 PROCEDURE — 99232 SBSQ HOSP IP/OBS MODERATE 35: CPT

## 2024-07-14 RX ADMIN — Medication 1 APPLICATION(S): at 11:24

## 2024-07-14 RX ADMIN — Medication 2 TABLET(S): at 22:00

## 2024-07-14 RX ADMIN — POLYETHYLENE GLYCOL 3350 17 GRAM(S): 1 POWDER ORAL at 17:01

## 2024-07-14 NOTE — PROGRESS NOTE ADULT - SUBJECTIVE AND OBJECTIVE BOX
MEDICATIONS  STANDING MEDICATIONS  chlorhexidine 2% Cloths 1 Application(s) Topical daily  enoxaparin Injectable 40 milliGRAM(s) SubCutaneous every 24 hours  polyethylene glycol 3350 17 Gram(s) Oral every 12 hours  senna 2 Tablet(s) Oral at bedtime    PRN MEDICATIONS    VITALS  T(F): 98.4 (07-14-24 @ 14:00), Max: 98.4 (07-14-24 @ 14:00)  HR: 64 (07-14-24 @ 14:00) (51 - 64)  BP: 109/69 (07-14-24 @ 14:00) (91/54 - 109/69)  RR: 18 (07-14-24 @ 14:00) (18 - 19)  SpO2: 100% (07-14-24 @ 07:32) (98% - 100%) MEDICATIONS  STANDING MEDICATIONS  chlorhexidine 2% Cloths 1 Application(s) Topical daily  enoxaparin Injectable 40 milliGRAM(s) SubCutaneous every 24 hours  polyethylene glycol 3350 17 Gram(s) Oral every 12 hours  senna 2 Tablet(s) Oral at bedtime    PRN MEDICATIONS    VITALS  T(F): 98.4 (07-14-24 @ 14:00), Max: 98.4 (07-14-24 @ 14:00)  HR: 64 (07-14-24 @ 14:00) (51 - 64)  BP: 109/69 (07-14-24 @ 14:00) (91/54 - 109/69)  RR: 18 (07-14-24 @ 14:00) (18 - 19)  SpO2: 100% (07-14-24 @ 07:32) (98% - 100%)     SUBJECTIVE/OVERNIGHT EVENTS  Today is hospital day 11d. This morning patient was seen and examined at bedside, resting comfortably in bed. No acute or major events overnight.    MEDICATIONS  STANDING MEDICATIONS  chlorhexidine 2% Cloths 1 Application(s) Topical daily  enoxaparin Injectable 40 milliGRAM(s) SubCutaneous every 24 hours  polyethylene glycol 3350 17 Gram(s) Oral every 12 hours  senna 2 Tablet(s) Oral at bedtime    PRN MEDICATIONS    VITALS  T(F): 98.4 (07-14-24 @ 14:00), Max: 98.4 (07-14-24 @ 14:00)  HR: 64 (07-14-24 @ 14:00) (51 - 64)  BP: 109/69 (07-14-24 @ 14:00) (91/54 - 109/69)  RR: 18 (07-14-24 @ 14:00) (18 - 19)  SpO2: 100% (07-14-24 @ 07:32) (98% - 100%)    PHYSICAL EXAM  GENERAL  (NAD, lying in bed comfortably      ABDOMEN  NT     SUBJECTIVE/OVERNIGHT EVENTS  Today is hospital day 11d. This morning patient was seen and examined at bedside, resting comfortably in bed. No acute or major events overnight.    MEDICATIONS  STANDING MEDICATIONS  chlorhexidine 2% Cloths 1 Application(s) Topical daily  enoxaparin Injectable 40 milliGRAM(s) SubCutaneous every 24 hours  polyethylene glycol 3350 17 Gram(s) Oral every 12 hours  senna 2 Tablet(s) Oral at bedtime    PRN MEDICATIONS    VITALS  T(F): 98.4 (07-14-24 @ 14:00), Max: 98.4 (07-14-24 @ 14:00)  HR: 64 (07-14-24 @ 14:00) (51 - 64)  BP: 109/69 (07-14-24 @ 14:00) (91/54 - 109/69)  RR: 18 (07-14-24 @ 14:00) (18 - 19)  SpO2: 100% (07-14-24 @ 07:32) (98% - 100%)    PHYSICAL EXAM  GENERAL  (NAD, lying in bed comfortably      ABDOMEN  pt refused exam

## 2024-07-14 NOTE — PROGRESS NOTE ADULT - SUBJECTIVE AND OBJECTIVE BOX
TANYA SANCHEZ 58y Male  MRN#: [id number]       SUBJECTIVE  Patient is a 58y old Male who presents with a chief complaint of abdominal pain (13 Jul 2024 09:49)  Currently admitted to medicine with the primary diagnosis of Abdominal pain  no overnight events         OBJECTIVE  PAST MEDICAL & SURGICAL HISTORY  Dementia    Down's syndrome    History of amblyopia      ALLERGIES:  penicillin (Rash)    MEDICATIONS:  STANDING MEDICATIONS  chlorhexidine 2% Cloths 1 Application(s) Topical daily  enoxaparin Injectable 40 milliGRAM(s) SubCutaneous every 24 hours  polyethylene glycol 3350 17 Gram(s) Oral every 12 hours  senna 2 Tablet(s) Oral at bedtime    PRN MEDICATIONS      VITAL SIGNS: Last 24 Hours  T(C): 36.6 (14 Jul 2024 05:20), Max: 36.7 (13 Jul 2024 20:06)  T(F): 97.9 (14 Jul 2024 05:20), Max: 98 (13 Jul 2024 20:06)  HR: 51 (14 Jul 2024 07:32) (51 - 64)  BP: 91/54 (14 Jul 2024 07:32) (91/54 - 100/64)  BP(mean): 67 (14 Jul 2024 07:32) (67 - 67)  RR: 19 (14 Jul 2024 07:32) (18 - 19)  SpO2: 100% (14 Jul 2024 07:32) (98% - 100%)    PHYSICAL EXAM:    GENERAL: no distress  HEENT:  No JVD  PULMONARY: Clear to auscultation bilaterally  CARDIOVASCULAR: Regular rate and rhythm  GASTROINTESTINAL: Soft, Nontender, Nondistended  MUSCULOSKELETAL:   No clubbing, cyanosis, or edema  NEUROLOGY:AAOx0  SKIN: No rashes or lesions

## 2024-07-14 NOTE — PROGRESS NOTE ADULT - ASSESSMENT
57 y/o  male with Down's Syndrome and Dementia, no other pmh, presented to the ED with his mother because of abdominal pain and vomiting. Per mother, pt is nonverbal but he has been holding his abdomen for the past few weeks, and he recently lost 20lbs in the past four months despite having good appetite.  According to the mother, pt also started having NBNB vomiting. Patient was scheduled for EGD/colono as OP but could not be done because pt could not drink the prep. Admitted for further investigation and management.    #Abdominal pain , Newly dx. Mesenteric Lesion  #Weight loss   #chronic constipation  - Pt nonverbal, but he has been holding his abdomen for the past few weeks  - 20lbs of weight loss over 4 months  - Scheduled for EGD/Colono OP, but could not be done as pt unable to drink the prep.  - 7/4 CT A/P with oral and IV contrast: Lesion within the mesentery abutting the small bowel measuring approximately 2.9 x 2.7 cm which is indeterminate. Otherwise unremarkable.  No bowel obstruction.  - Previous CT A/P on 10/5/23 was negative.  - On miralax and senna   MRI Abdomen:   2.7 cm left retroperitoneal mass, probably arising from the adrenal   gland-suspicious for pheochromocytoma.    Family denies any pheochromcytoma like paroxysms of sweating/pallor/palpitations.   Endocrine eval noted. Will check 24 hour urine for fractionated metanephrines and Plasma-free metanephrines level.   Even if pheochromocytoma confirmed, likely incidental finding? Does not explain his abdominal pain?  If not pheochromocytoma, need to workup for other differentials of Adrenal mass.    GI was planning inpatient EGD/Colonoscopy to look for any explanation for Abdominal pain & Weight loss.  c/w diet for now.   Miralax, Senna    Pending:  metanephrine level  EGD/Colonoscopy    Dispo: home with caregiver once medically stable .

## 2024-07-14 NOTE — PROGRESS NOTE ADULT - ASSESSMENT
59 y/o  male with Down's Syndrome and Dementia, no other pmh, presented to the ED with his mother because of abdominal pain and vomiting. Per mother, pt is nonverbal but he has been holding his abdomen for the past few weeks, and he recently lost 20lbs in the past four months despite having good appetite.  According to the mother, pt also started having NBNB vomiting. Patient was scheduled for EGD/colono as OP but could not be done because pt could not drink the prep. Admitted for further investigation and management.    #Abdominal pain , Newly dx. Mesenteric Lesion  #Weight loss   #chronic constipation  - Pt nonverbal, but he has been holding his abdomen for the past few weeks  - 20lbs of weight loss over 4 months  - Scheduled for EGD/Colono OP, but could not be done as pt unable to drink the prep.  - 7/4 CT A/P with oral and IV contrast: Lesion within the mesentery abutting the small bowel measuring approximately 2.9 x 2.7 cm which is indeterminate. Otherwise unremarkable.  No bowel obstruction.  - Previous CT A/P on 10/5/23 was negative.  - On miralax and senna   MRI Abdomen:   2.7 cm left retroperitoneal mass, probably arising from the adrenal   gland-suspicious for pheochromocytoma.    Family denies any pheochromcytoma like paroxysms of sweating/pallor/palpitations.   Endocrine eval noted. Will check 24 hour urine for fractionated metanephrines and Plasma-free metanephrines level.   Even if pheochromocytoma confirmed, likely incidental finding? Does not explain his abdominal pain?  If not pheochromocytoma, need to workup for other differentials of Adrenal mass.    GI was planning inpatient EGD/Colonoscopy to look for any explanation for Abdominal pain & Weight loss.  c/w diet for now.   Miralax, Senna    Pending:  metanephrine level  EGD/Colonoscopy    Dispo: home with caregiver once medically stable .

## 2024-07-14 NOTE — PROGRESS NOTE ADULT - PROVIDER SPECIALTY LIST ADULT
Gastroenterology
Hospitalist
Internal Medicine
Gastroenterology
Hospitalist
Hospitalist
Internal Medicine
Gastroenterology
Gastroenterology
Hospitalist
Internal Medicine

## 2024-07-15 ENCOUNTER — TRANSCRIPTION ENCOUNTER (OUTPATIENT)
Age: 59
End: 2024-07-15

## 2024-07-15 VITALS
DIASTOLIC BLOOD PRESSURE: 72 MMHG | RESPIRATION RATE: 18 BRPM | HEART RATE: 71 BPM | OXYGEN SATURATION: 100 % | TEMPERATURE: 98 F | SYSTOLIC BLOOD PRESSURE: 113 MMHG

## 2024-07-15 PROCEDURE — 99239 HOSP IP/OBS DSCHRG MGMT >30: CPT

## 2024-07-15 RX ORDER — POLYETHYLENE GLYCOL 3350 1 G/G
17 POWDER ORAL
Qty: 1 | Refills: 1
Start: 2024-07-15 | End: 2024-09-12

## 2024-07-15 RX ADMIN — ENOXAPARIN SODIUM 40 MILLIGRAM(S): 100 INJECTION SUBCUTANEOUS at 11:08

## 2024-07-15 RX ADMIN — POLYETHYLENE GLYCOL 3350 17 GRAM(S): 1 POWDER ORAL at 05:25

## 2024-07-15 RX ADMIN — Medication 1 APPLICATION(S): at 11:10

## 2024-07-15 NOTE — DISCHARGE NOTE PROVIDER - ATTENDING DISCHARGE PHYSICAL EXAMINATION:
GENERAL: no distress  HEENT:  No JVD  PULMONARY: Clear to auscultation bilaterally  CARDIOVASCULAR: Regular rate and rhythm  GASTROINTESTINAL: Soft, Nontender, Nondistended  MUSCULOSKELETAL:   No clubbing, cyanosis, or edema  NEUROLOGY: Alert and awake, oriented x0, non verbal, moves all exts,   SKIN: No rashes or lesions

## 2024-07-15 NOTE — DISCHARGE NOTE PROVIDER - CARE PROVIDER_API CALL
Anne Wilson  Gastroenterology  [address] [place] [address]  Phone: [phone]  Fax: [phone]  Follow Up Time: 2 weeks   Anne Wilson  Gastroenterology  [address] [place] [address]  Phone: [phone]  Fax: [phone]  Follow Up Time: 2 weeks    Link Camarena  Endocrinology/Metab/Diabetes  [address] [place] [address]  Phone: [phone]  Fax: [phone]  Follow Up Time: 2 weeks    Emily Dobbs  Complex General Surgical Oncology  [address] [place] [address], [address] [place] [address]  Phone: [phone]  Fax: [phone]  Follow Up Time: 1 month

## 2024-07-15 NOTE — DISCHARGE NOTE PROVIDER - NSDCMRMEDTOKEN_GEN_ALL_CORE_FT
Senokot 8.6 mg oral tablet: 2 tab(s) orally once a day (at bedtime)   MiraLax oral powder for reconstitution: 17 gram(s) orally once a day as needed for  constipation  Senokot 8.6 mg oral tablet: 2 tab(s) orally once a day (at bedtime)

## 2024-07-15 NOTE — DISCHARGE NOTE PROVIDER - PROVIDER TOKENS
PROVIDER:[TOKEN:[[id number]],FOLLOWUP:[2 weeks]] PROVIDER:[TOKEN:[[id number]],FOLLOWUP:[2 weeks]],PROVIDER:[TOKEN:[[id number]],FOLLOWUP:[2 weeks]],PROVIDER:[TOKEN:[[id number]],FOLLOWUP:[1 month]]

## 2024-07-15 NOTE — CHART NOTE - NSCHARTNOTEFT_GEN_A_CORE
Registered Dietitian Follow-Up     Patient Profile Reviewed                           Yes [x]   No []     Pertinent Subjective Information:  Patient noted with fair to good PO intake of meals (consuming %)     Pertinent Medical Interventions:  Abdominal pain, new dx mesenteric lesion; weight loss; chronic constipation; GI was planning inpatient EGD/Colonoscopy to look for any explanation for Abdominal pain & Weight loss.  c/w diet for now.     Diet order:   Diet, DASH/TLC:   Sodium & Cholesterol Restricted (07-11-24 @ 18:24) [Active]    Anthropometrics:  Height (cm): 157.5 (07-11-24 @ 11:44)  Weight (kg): 63.5 (07-11-24 @ 11:44)  BMI (kg/m2): 25.6 (07-11-24 @ 11:44)  IBW: 50 kg       MEDICATIONS  (STANDING):  chlorhexidine 2% Cloths 1 Application(s) Topical daily  enoxaparin Injectable 40 milliGRAM(s) SubCutaneous every 24 hours  polyethylene glycol 3350 17 Gram(s) Oral every 12 hours  senna 2 Tablet(s) Oral at bedtime    MEDICATIONS  (PRN):    Pertinent Labs:   Finger Sticks:    Physical Findings:  - Appearance: alert, nonverbal   - GI function: 4x BM on 7/12   - Tubes: n/a - no feeding tubes   - Oral/Mouth cavity: regular texture/thin liquids  - Skin: no pressure injuries documented   - Edema: no edema documented      Nutrition Requirements:  Weight Used: 63.5 kg      Estimated Energy Needs    Continue [x]  Adjust []  1348-1618kcal/day (using MSJ 1-1.2AF)      Estimated Protein Needs    Continue [x]  Adjust []  64-77g/day (1-1.2g/kg)      Estimated Fluid Needs        Continue [x]  Adjust []  1 mL/kcal      Nutrient Intake: NPO     [x] Previous Nutrition Diagnosis:  Inadequate Protein Energy Intake             [x] Ongoing          [] Resolved    Nutrition Education:  deferred     Nutrition Intervention:  meals and snacks, coordination of care     Goal/Expected Outcome:   PO intake >/=75% of meals within 5-7 days     Indicator/Monitoring:   diet order, labs, skin status, NFPF     Recommendation:   1) Continue current diet order  2) Add Ensure Plus HP 3x/day (350 kcal, 20 gm Protein each) to aid in optimizing kcal and protein intake   3) Monitor BM, GI s/s    Patient is at moderate nutrition risk, RD to f/u in 5-7 days or PRN  RD to remain available: Melissa Box, [place] [phone] or via Teams. Registered Dietitian Follow-Up     Patient Profile Reviewed                           Yes [x]   No []     Pertinent Subjective Information:  Patient noted with fair to good PO intake of meals (consuming %)     Pertinent Medical Interventions:  Abdominal pain, new dx mesenteric lesion; weight loss; chronic constipation; GI was planning inpatient EGD/Colonoscopy to look for any explanation for Abdominal pain & Weight loss.  c/w diet for now.     Diet order:   Diet, DASH/TLC:   Sodium & Cholesterol Restricted (07-11-24 @ 18:24) [Active]    Anthropometrics:  Height (cm): 157.5 (07-11-24 @ 11:44)  Weight (kg): 63.5 (07-11-24 @ 11:44)  BMI (kg/m2): 25.6 (07-11-24 @ 11:44)  IBW: 50 kg       MEDICATIONS  (STANDING):  chlorhexidine 2% Cloths 1 Application(s) Topical daily  enoxaparin Injectable 40 milliGRAM(s) SubCutaneous every 24 hours  polyethylene glycol 3350 17 Gram(s) Oral every 12 hours  senna 2 Tablet(s) Oral at bedtime    MEDICATIONS  (PRN):    Pertinent Labs:   Finger Sticks:    Physical Findings:  - Appearance: alert, nonverbal   - GI function: 4x BM on 7/12   - Tubes: n/a - no feeding tubes   - Oral/Mouth cavity: regular texture/thin liquids  - Skin: no pressure injuries documented   - Edema: no edema documented      Nutrition Requirements:  Weight Used: 63.5 kg      Estimated Energy Needs    Continue [x]  Adjust []  1348-1618kcal/day (using MSJ 1-1.2AF)      Estimated Protein Needs    Continue [x]  Adjust []  64-77g/day (1-1.2g/kg)      Estimated Fluid Needs        Continue [x]  Adjust []  1 mL/kcal      Nutrient Intake: NPO     [x] Previous Nutrition Diagnosis:  Inadequate Protein Energy Intake             [x] Ongoing          [] Resolved    Nutrition Education:  deferred     Nutrition Intervention:  meals and snacks, coordination of care     Goal/Expected Outcome:   PO intake >/=75% of meals within 5-7 days     Indicator/Monitoring:   diet order, labs, skin status, NFPF     Recommendation:   1) Continue current diet order  2) Add Ensure Plus HP daily (350 kcal, 20 gm Protein each) to aid in optimizing kcal and protein intake   3) Monitor BM, GI s/s    Patient is at moderate nutrition risk, RD to f/u in 5-7 days or PRN  RD to remain available: Melissa Box, [place] [phone] or via Teams.

## 2024-07-15 NOTE — DISCHARGE NOTE PROVIDER - HOSPITAL COURSE
I called patient as I received message that he is interested in joining the program again but had to leave a message.   59 y/o  male with Down's Syndrome and Dementia, no other pmh, presented to the ED with his mother because of abdominal pain and vomiting. Per mother, pt is nonverbal but he has been holding his abdomen for the past few weeks, and he recently lost 20lbs in the past four months despite having good appetite.  According to the mother, pt also started having NBNB vomiting. Patient was scheduled for EGD/colono as OP but could not be done because pt could not drink the prep. Admitted for further investigation and management.    #Abdominal pain ,   # new Mesenteric Lesion  #Weight loss ,  #chronic constipation  MRI Abdomen:   2.7 cm left retroperitoneal mass, probably arising from the adrenal   gland-suspicious for pheochromocytoma.    GI planning for egd/ c-scope to look for any explanation for Abdominal pain & Weight loss.once r/u pheochromocytoma, pending metanephrine level result, need 1 week, can dc an f/u w GI for scopes or present to ER for inpt scopes    the mass should be followed by Endocrine clinic and surgery clinic for furtherevaluation and need of possible surgical removal  planned dw the mother at bedside     c/w diet for now.   Miralax, Senna

## 2024-07-15 NOTE — DISCHARGE NOTE PROVIDER - CARE PROVIDERS DIRECT ADDRESSES
,[email or web address]

## 2024-07-15 NOTE — DISCHARGE NOTE PROVIDER - NSDCCPCAREPLAN_GEN_ALL_CORE_FT
PRINCIPAL DISCHARGE DIAGNOSIS  Diagnosis: Abdominal pain  Assessment and Plan of Treatment: due to long time of abdominal pain and weight loss theres need for endoscopy and colonscopy for proper evaluation  but because we found a mass in the abdomen we need to rule out tuimor of the adrenal gland that can affect the procedure, the test was sent and it needs 1 week to get result, so you can follow up with GI clinic or present to emergency room in 10 days for scopes      SECONDARY DISCHARGE DIAGNOSES  Diagnosis: Abdominal mass  Assessment and Plan of Treatment: 2.7 cm left retroperitoneal mass, probably arising from the adrenal   gland, this need to be followed by Endocrine clinic and surgery clinic for furtherevaluation and need of possible surgical removal

## 2024-07-15 NOTE — DISCHARGE NOTE NURSING/CASE MANAGEMENT/SOCIAL WORK - PATIENT PORTAL LINK FT
You can access the FollowMyHealth Patient Portal offered by [facility] by registering at the following website: [email or web address]. By joining [employer]’s FollowMyHealth portal, you will also be able to view your health information using other applications (apps) compatible with our system.

## 2024-07-18 LAB
METANEPHRINE, PL: <25 PG/ML — SIGNIFICANT CHANGE UP (ref 0–88)
NORMETANEPHRINE, PL: 296.8 PG/ML — HIGH (ref 0–244)

## 2024-07-25 NOTE — CDI QUERY NOTE - NSCDIOTHERTXTBX_GEN_ALL_CORE_HH
DOCUMENTATION CLARIFICATION FORM   Encounter #: [id number]                                             Patient’s Name: TANYA SANCHEZ  Medical Record #: [id number]                                          Admit Date: 7-3-2024  : 1965                                                              Discharged: 7-  CDI Specialist/: Jj                                         Contact #: [phone]    Dear Dr. Palomino,                                                               Date: 2024              Clinical documentation and/or evidence in the medical record indicates that this patient has encephalopathy.  In order to ensure accurate coding and accuracy of the clinical record, the documentation in this patient’s medical record requires additional clarification.      Please include more specific documentation as to the type of encephalopathy in your progress note and/or discharge summary.    Please clarify if the prescribed Ativan and Haldol can be further specified as:  •	Ativan and Haldol were given for agitation  •	Other (specify)    Supporting documentation and/or clinical evidence:   •	7-3 H&P A 58 yr old male with Down's Syndrome and Dementia  •	7-3 RN Flowsheet…Mood/Behavior…  Calm   •	 07:51 RN Flowsheet… Mood/Behavior…combative (aggressive at times)  •	 19:26 RN Flowsheet… Mood/Behavior…combative (aggressive at times)  •	 09:27 Mood/Behavior…  Calm  •	 Medicine… no agitation…  •	 12:32 RN Progress Note… AT 12 PT Phlebotomy team came back but was unable to draw a blood, due to pt agitation  Treatment  •	7-3 11:29 Haldol 5mg IM x 1 dose  •	7-3 15:42 Ativan 2mg IVP x 1 dose  •	 01:32 Ativan 1mg IVP x 1 dose  •	 11:40 Ativan 2mg IVP x 1 dose  •	 10:48 Ativan 1mg IM x 1 dose  •	 11:39 Haldol 2.5mg IM x 1 dose

## 2024-08-02 ENCOUNTER — OUTPATIENT (OUTPATIENT)
Dept: OUTPATIENT SERVICES | Facility: HOSPITAL | Age: 59
LOS: 1 days | End: 2024-08-02
Payer: MEDICARE

## 2024-08-02 ENCOUNTER — APPOINTMENT (OUTPATIENT)
Dept: GASTROENTEROLOGY | Facility: CLINIC | Age: 59
End: 2024-08-02
Payer: MEDICARE

## 2024-08-02 VITALS
OXYGEN SATURATION: 100 % | WEIGHT: 138 LBS | BODY MASS INDEX: 24.45 KG/M2 | DIASTOLIC BLOOD PRESSURE: 103 MMHG | SYSTOLIC BLOOD PRESSURE: 130 MMHG | HEIGHT: 63 IN | HEART RATE: 60 BPM

## 2024-08-02 DIAGNOSIS — R63.4 ABNORMAL WEIGHT LOSS: ICD-10-CM

## 2024-08-02 DIAGNOSIS — R10.9 UNSPECIFIED ABDOMINAL PAIN: ICD-10-CM

## 2024-08-02 DIAGNOSIS — K59.00 CONSTIPATION, UNSPECIFIED: ICD-10-CM

## 2024-08-02 DIAGNOSIS — Z12.11 ENCOUNTER FOR SCREENING FOR MALIGNANT NEOPLASM OF COLON: ICD-10-CM

## 2024-08-02 DIAGNOSIS — Z00.00 ENCOUNTER FOR GENERAL ADULT MEDICAL EXAMINATION WITHOUT ABNORMAL FINDINGS: ICD-10-CM

## 2024-08-02 PROCEDURE — 99213 OFFICE O/P EST LOW 20 MIN: CPT

## 2024-08-04 NOTE — PHYSICAL EXAM
[Alert] : alert [No Acute Distress] : no acute distress [Well Developed] : well developed [Well Nourished] : well nourished [Sclera] : the sclera and conjunctiva were normal [Hearing Threshold Finger Rub Not [place]] : hearing was normal [Normal Lips/Gums] : the lips and gums were normal [Oropharynx] : the oropharynx was normal [Normal Appearance] : the appearance of the neck was normal [No Neck Mass] : no neck mass was observed [No Respiratory Distress] : no respiratory distress [No Acc Muscle Use] : no accessory muscle use [Respiration, Rhythm And Depth] : normal respiratory rhythm and effort [Auscultation Breath Sounds / Voice Sounds] : lungs were clear to auscultation bilaterally [Heart Rate And Rhythm] : heart rate was normal and rhythm regular [Normal S1, S2] : normal S1 and S2 [Murmurs] : no murmurs [Bowel Sounds] : normal bowel sounds [Abdomen Tenderness] : non-tender [No Masses] : no abdominal mass palpated [Abdomen Soft] : soft [No CVA Tenderness] : no CVA  tenderness [de-identified] : Nonverbal

## 2024-08-04 NOTE — PHYSICAL EXAM
[Alert] : alert [No Acute Distress] : no acute distress [Well Developed] : well developed [Well Nourished] : well nourished [Sclera] : the sclera and conjunctiva were normal [Hearing Threshold Finger Rub Not [place]] : hearing was normal [Normal Lips/Gums] : the lips and gums were normal [Oropharynx] : the oropharynx was normal [Normal Appearance] : the appearance of the neck was normal [No Neck Mass] : no neck mass was observed [No Respiratory Distress] : no respiratory distress [No Acc Muscle Use] : no accessory muscle use [Respiration, Rhythm And Depth] : normal respiratory rhythm and effort [Auscultation Breath Sounds / Voice Sounds] : lungs were clear to auscultation bilaterally [Heart Rate And Rhythm] : heart rate was normal and rhythm regular [Normal S1, S2] : normal S1 and S2 [Murmurs] : no murmurs [Bowel Sounds] : normal bowel sounds [No Masses] : no abdominal mass palpated [Abdomen Tenderness] : non-tender [Abdomen Soft] : soft [No CVA Tenderness] : no CVA  tenderness [de-identified] : Nonverbal

## 2024-08-04 NOTE — REVIEW OF SYSTEMS
[Recent Weight Loss (___ Lbs)] : recent [unfilled] ~Ulb weight loss [As Noted in HPI] : as noted in HPI [Abdominal Pain] : abdominal pain [Constipation] : constipation [Negative] : Heme/Lymph [Scleral Icterus (Yellow Eyes)] : no scleral icterus [Cough] : no cough [Vomiting] : no vomiting [Diarrhea] : no diarrhea [Heartburn] : no heartburn [Melena (black stool)] : no melena [Fecal Incontinence (soiling)] : no fecal incontinence [Jaundice (yellowing of skin)] : no jaundice [Easy Bleeding] : no tendency for easy bleeding [Swollen Glands] : no swollen glands

## 2024-08-04 NOTE — HISTORY OF PRESENT ILLNESS
[FreeTextEntry1] : 58 yr old male with Down's Syndrome and Dementia at average risk for CRC went to the ED on 10/5/23 for chronic RLQ pain. According to his mother, he is nonverbal but he has been holding his RLQ for the past year. She also stated that he has lost about 20 lbs in the past year. She stated that he has a good appetite and eats well. He has a long H/O constipation; he takes Senokot q day and Miralax without BMs q day.   He was planned for EGD/colonoscopy for abdominal pain and weight loss, did not tolerate prep and then was sent for inpatient EGD/colonoscopy.  On admission MRI revealed adrenal mass concerning for pheochromocytoma. Metanephrines were elevated. Patient was discharged from hospital with plan to follow up with endocrine and surgery prior to endoscopic intervention.

## 2024-08-04 NOTE — ASSESSMENT
[FreeTextEntry1] : 58 yr old male with Down's Syndrome and Dementia at average risk for CRC chronic RLQ pain and reported unintentional weight loss, was planned for EGD/colonoscopy but found to have pheochromocytoma on MRI, pending endocrine evaluation prior to procedure.   CRC Screening Weight loss/Abdominal pain - Patient did not tolerate prep and then was planned for inpatient EGD/colonoscopy. While inpatient found to have pheochromocytoma on MRI - Fu endocrine for evaluation of pheochromocytoma and clearance for procedure (appt Aug 20) - When cleared by endocrine/surgery will plan for inpatient EGD/colonoscopy, mother advised to go to ER for admission after clearance from endocrinology   Constipation - Senokot 2 at hs, colace 100 mg 3 OD, and Miralax OD to bid recommended

## 2024-08-05 NOTE — ED PROVIDER NOTE - NS ED ATTENDING STATEMENT MOD
Speech Therapy      Visit Type: Treatment and Discharge  -  Swallow  Reason for consult: History of dysphagia    Relevant History/Co-morbidities: Admitted for Weakness, bilateral lower extremity cellulitis    Videoswallow - 2019 - WFL  Seen at [facility] for swallow evaluation in 4/2024 - discharged on regular and thin liquids    SUBJECTIVE  - Patient agreed to participate in therapy this date.    Patient pleasant and cooperative, noted to be tangential, redirectable with verbal cues. Reported very thirsty, requesting water. Of note patient on fluid restriction, has marked cup in room on bedside table with ~ 100 mL remaining, consumed during session, no additional liquids consumed with SLP.      Patient reports breakfast tolerated well without difficulty, \"I at the whole thing, I'm stuffed!!\" States benefit from pills whole in puree, no difficulty. RN Masha reported no PO tolerance concerns.   Reports difficulty with undercooked solids at baseline, such as wild -\"they seem like little arrows when he doesn't cook them, he doesn't cook them enough ever ya know, so I don;t eat it, same with carrots.\"     Per chart, tolerating room air, afebrile. No new chest imaging to review.     Functional Cognition:    - Expression is verbal.     - Following commands intact.      Affect/Behavior: alert and appropriate    Pain at onset of session:   RN informed on pain level.    - Location: Patient reports abdominal discomfort, RN present and aware.      OBJECTIVE      Diet prior to visit: Regular and Liquid- Thin  - Feeding: feeds self    Swallow  No temperature spike noted.  Patient positioning: upright in bed  Pretrial vocal quality: normal  Volitional swallow: present. No pain associated with swallow.  Patient seen with thin liquids (~ 50 mL) via straw and regular solids. Very slightly slow mastication with regular solids, however appears WFL and efficient for consumption. Patient agreeable to limited intake however, unremarkable and  clinically tolerating per patient, RN, and chart review.     Numbers listed with consistency indicate IDDSI diet level.  Oral and pharyngeal phases assessed and found clinically unremarkable for thin (0), regular, thin (0), single swallow straw and thin (0), sequential swallow straw.    Esophageal symptoms: not present                   Education:   - Present and ready to learn: patient  Education provided during session:  - dysphagia, role of SLP and aspiration precautions  - Results of above outlined education: Needs reinforcement and Verbalizes understanding    ASSESSMENT  Progress: Goals met    Discharge needs based on today's assessment:  - Current level of function: at baseline level of function  - Therapy needs at discharge: does not require ongoing therapy  - ADL / IADLs (activities / instrumental activities of daily living) requiring support at discharge: nutrition management (eating/drinking)  - Impairments that require further therapy intervention: dysphagia    Patient seen for ongoing swallowing intervention session with focus on diet tolerance; further assessment of need for diet modification or objective imaging. See objective section above for details. Functional and appropriate mastication of solids. No overt clinical signs or symptoms of penetration/aspiration noted with vocal quality remaining clear and no coughing/throat clearing throughout. Patient denied globus sensation this session, denied coughing or globus with meal this morning.   Continue with diet recommendations listed below. Appears clinically tolerating baseline diet without concerns. Patient demonstrated strong awareness of what food items are successful for her and which are more difficult with avoidance of same, denies any present concerns or needs for ongoing intervention.     No further acute care speech therapy needs warranted at this time. Will sign off. Please re-consult if new concerns arise.         PLAN (while  hospitalized)  SLP Frequency: DC speech therapy  Frequency Comments: Tolerating baseline diet.             RECOMMENDATIONS     -Diet:          *Liquid- Thin and Regular    -Medication Administration:         *patient preference    -Feeding Guidelines:          *feeds self, sit up straight in bed/chair, periodic liquid wash and limit distractions when eating    -Speech Reviewed Swallow:         *with patient/family and with clinical caregivers    GOALS  Review Date: 8/9/2024  Long Term Goals: (to be met by time of discharge from hospital)  Patient will manage Liquid- Thin and Regular diet with optimum safety and efficiency of swallow function without aspiration related [place]. (GOAL MET 8/5/24)     Documented in the chart in the following areas:    Assessment.    Patient at End of Session:   Location: in bed  Safety measures: call light within reach and lines intact  Handoff to: nurse      Therapy procedure time and total treatment time can be found documented on the Time Entry flowsheet   This was a shared visit with the GRANT. I reviewed and verified the documentation.

## 2024-08-15 ENCOUNTER — APPOINTMENT (OUTPATIENT)
Dept: SURGERY | Facility: CLINIC | Age: 59
End: 2024-08-15
Payer: MEDICARE

## 2024-08-15 VITALS
BODY MASS INDEX: 24.98 KG/M2 | WEIGHT: 141 LBS | DIASTOLIC BLOOD PRESSURE: 60 MMHG | HEIGHT: 63 IN | SYSTOLIC BLOOD PRESSURE: 92 MMHG

## 2024-08-15 DIAGNOSIS — Q90.9 DOWN SYNDROME, UNSPECIFIED: ICD-10-CM

## 2024-08-15 DIAGNOSIS — R19.00 INTRA-ABDOMINAL AND PELVIC SWELLING, MASS AND LUMP, UNSPECIFIED SITE: ICD-10-CM

## 2024-08-15 PROCEDURE — 99205 OFFICE O/P NEW HI 60 MIN: CPT

## 2024-08-15 NOTE — HISTORY OF PRESENT ILLNESS
[de-identified] : TANYA SANCHEZ  is a pleasant 59 year old man  who came in 08/15/2024 for left retroperitoneal mass~  . He has Dr MARISA ROCHA as His PCP.  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

## 2024-08-15 NOTE — ASSESSMENT
[FreeTextEntry1] : TANYA SANCHEZ  is a pleasant 59 year old man  who came in 08/15/2024 for left retroperitoneal mass~  . He has Dr MARISA ROCHA as His PCP.  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 patient instructed to follow up with the referring physician and patient primary care provider   A total of 80 minutes was spent on this visit, obtaining h/p,  reviewing previous notes/imaging, counseling the patient on coming procedure and f/u , ordering tests (below), and documenting the findings in the note.

## 2024-08-24 ENCOUNTER — OUTPATIENT (OUTPATIENT)
Dept: OUTPATIENT SERVICES | Facility: HOSPITAL | Age: 59
LOS: 1 days | End: 2024-08-24
Payer: MEDICARE

## 2024-08-24 PROCEDURE — 83835 ASSAY OF METANEPHRINES: CPT

## 2024-08-24 PROCEDURE — 82530 CORTISOL FREE: CPT

## 2024-08-28 DIAGNOSIS — Z00.00 ENCOUNTER FOR GENERAL ADULT MEDICAL EXAMINATION WITHOUT ABNORMAL FINDINGS: ICD-10-CM

## 2024-08-29 DIAGNOSIS — Z00.00 ENCOUNTER FOR GENERAL ADULT MEDICAL EXAMINATION WITHOUT ABNORMAL FINDINGS: ICD-10-CM

## 2024-08-29 LAB
CORTIS 24H UR-MCNC: 24 H
CORTIS 24H UR-MRATE: 19 MCG/24 H
SPECIMEN VOL 24H UR: 1050 ML

## 2024-08-30 LAB
METANEPH 24H UR-MRATE: 66 UG/24 HR
METANEPHS 24H UR-MCNC: 63 UG/L
NORMETANEPHRINE 24 HR URINE: 357 UG/24 HR
NORMETANEPHRINE 24H UR-MCNC: 340 UG/L

## 2024-09-05 ENCOUNTER — APPOINTMENT (OUTPATIENT)
Dept: SURGERY | Facility: CLINIC | Age: 59
End: 2024-09-05

## 2024-09-05 VITALS
HEART RATE: 70 BPM | OXYGEN SATURATION: 95 % | BODY MASS INDEX: 24.98 KG/M2 | HEIGHT: 63 IN | TEMPERATURE: 97 F | WEIGHT: 141 LBS

## 2024-09-05 DIAGNOSIS — R19.00 INTRA-ABDOMINAL AND PELVIC SWELLING, MASS AND LUMP, UNSPECIFIED SITE: ICD-10-CM

## 2024-09-05 PROCEDURE — 99214 OFFICE O/P EST MOD 30 MIN: CPT

## 2024-10-09 ENCOUNTER — OUTPATIENT (OUTPATIENT)
Dept: OUTPATIENT SERVICES | Facility: HOSPITAL | Age: 59
LOS: 1 days | End: 2024-10-09
Payer: MEDICARE

## 2024-10-09 VITALS
SYSTOLIC BLOOD PRESSURE: 128 MMHG | HEART RATE: 72 BPM | TEMPERATURE: 99 F | WEIGHT: 141.98 LBS | OXYGEN SATURATION: 96 % | HEIGHT: 64 IN | RESPIRATION RATE: 19 BRPM | DIASTOLIC BLOOD PRESSURE: 77 MMHG

## 2024-10-09 DIAGNOSIS — K08.199 COMPLETE LOSS OF TEETH DUE TO OTHER SPECIFIED CAUSE, UNSPECIFIED CLASS: Chronic | ICD-10-CM

## 2024-10-09 DIAGNOSIS — Z01.818 ENCOUNTER FOR OTHER PREPROCEDURAL EXAMINATION: ICD-10-CM

## 2024-10-09 DIAGNOSIS — Z00.8 ENCOUNTER FOR OTHER GENERAL EXAMINATION: ICD-10-CM

## 2024-10-09 PROCEDURE — 99214 OFFICE O/P EST MOD 30 MIN: CPT | Mod: 25

## 2024-10-09 NOTE — H&P PST ADULT - HISTORY OF PRESENT ILLNESS
59 year old male presents for SEDATION NM MIGB SCAN with Dr. Link under general anesthesia on 10/16/24 under general anesthesia.    Patient recently seen by Dr. Dobbs in office on 9/5/24, as per office note left retroperitoneal mass found on 08/15/2024 ?  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    PATIENT / GUARDIAN CURRENTLY DENIES CHEST PAIN  SHORTNESS OF BREATH  PALPITATIONS,  DYSURIA, OR UPPER RESPIRATORY INFECTION IN PAST 2 WEEKS  Patient / Guardian verbalized understanding of instructions and was given the opportunity to ask questions and have them answered.  As per patient, this is their complete medical and surgical history, including medications both prescribed or over the counter.  written and verbal instructions with teach back on chlorhexidine shampoo provided,  pt verbalized understanding with returned demonstration    Anesthesia Alert  NO--Difficult Airway  NO--History of neck surgery or radiation  NO--Limited ROM of neck  NO--History of Malignant hyperthermia  NO--Personal or family history of Pseudocholinesterase deficiency   NO--Prior Anesthesia Complication  NO--Latex Allergy  NO--Loose teeth  NO--History of Rheumatoid Arthritis  NO--KARIE  NO--Bleeding risk  NO--Other_____  Mallampati airway: Class II     Revised Cardiac Risk Index for Pre-Operative Risk from [employer]  on 10/9/2024  ** All calculations should be rechecked by clinician prior to use **    RESULT SUMMARY:  0 points  Class I Risk    3.9 %  30-day risk of death, MI, or cardiac arrest    From [employer] 2017. These numbers are higher than those from the original study (Azam 1999). See Evidence for details.      INPUTS:  Elevated-risk surgery —> 0 = No  History of ischemic heart disease —> 0 = No  History of congestive heart failure —> 0 = No  History of cerebrovascular disease —> 0 = No  Pre-operative treatment with insulin —> 0 = No  Pre-operative creatinine >2 mg/dL / 176.8 µmol/L —> 0 = No    Duke Activity Status Index (DASI) from [employer]  on 10/9/2024  ** All calculations should be rechecked by clinician prior to use **    RESULT SUMMARY:  12.75 points  The higher the score (maximum 58.2), the higher the functional status.    4.31 METs        INPUTS:  Take care of self —> 2.75 = Yes  Walk indoors —> 1.75 = Yes  Walk 1&ndash;2 blocks on level ground —> 2.75 = Yes  Climb a flight of stairs or walk up a hill —> 5.5 = Yes  Run a short distance —> 0 = No  Do light work around the house —> 0 = No  Do moderate work around the house —> 0 = No  Do heavy work around the house —> 0 = No  Do yardwork —> 0 = No  Have sexual relations —> 0 = No  Participate in moderate recreational activities —> 0 = No  Participate in strenuous sports —> 0 = No

## 2024-10-09 NOTE — H&P PST ADULT - REASON FOR ADMISSION
.. Ongoing SW/CM Assessment/Plan of Care Note     See SW/CM flowsheets for goals and other objective data.    3PM: MD order received for patient to return to nursing home. Clinical updates have been sent. Confirmed with Moon from [facility] that [facility] may [facility] this evening. [facility] Spoke with patient's RN Bernadette.    Progress note:  [phone]assisted) patient. This worker advised VINITA Fleming that MD order will be needed for patient to return to nursing home. This is necessary for  to send clinical updates to nursing home.    CM/SW team to continue to follow for discharge needs.      
59 year old male presents for SEDATION NM MIGB SCAN with Dr. Link under general anesthesia on 10/16/24 under general anesthesia

## 2024-10-09 NOTE — H&P PST ADULT - NSICDXPASTMEDICALHX_GEN_ALL_CORE_FT
PAST MEDICAL HISTORY:  Dementia     Down's syndrome     History of amblyopia      PAST MEDICAL HISTORY:  Dementia     Down's syndrome     History of amblyopia     History of weight loss     Nonverbal

## 2024-10-09 NOTE — H&P PST ADULT - NSICDXFAMILYHX_GEN_ALL_CORE_FT
FAMILY HISTORY:  Mother  Still living? Unknown  FH: CAD (coronary artery disease), Age at diagnosis: Age Unknown  FH: hypertension, Age at diagnosis: Age Unknown

## 2024-10-10 DIAGNOSIS — Z00.8 ENCOUNTER FOR OTHER GENERAL EXAMINATION: ICD-10-CM

## 2024-10-10 DIAGNOSIS — Z01.818 ENCOUNTER FOR OTHER PREPROCEDURAL EXAMINATION: ICD-10-CM

## 2024-10-15 ENCOUNTER — OUTPATIENT (OUTPATIENT)
Dept: OUTPATIENT SERVICES | Facility: HOSPITAL | Age: 59
LOS: 1 days | End: 2024-10-15

## 2024-10-15 DIAGNOSIS — K08.199 COMPLETE LOSS OF TEETH DUE TO OTHER SPECIFIED CAUSE, UNSPECIFIED CLASS: Chronic | ICD-10-CM

## 2024-10-15 DIAGNOSIS — R19.00 INTRA-ABDOMINAL AND PELVIC SWELLING, MASS AND LUMP, UNSPECIFIED SITE: ICD-10-CM

## 2024-10-16 DIAGNOSIS — R19.00 INTRA-ABDOMINAL AND PELVIC SWELLING, MASS AND LUMP, UNSPECIFIED SITE: ICD-10-CM

## 2024-10-18 ENCOUNTER — NON-APPOINTMENT (OUTPATIENT)
Age: 59
End: 2024-10-18

## 2024-10-18 PROBLEM — Z87.898 PERSONAL HISTORY OF OTHER SPECIFIED CONDITIONS: Chronic | Status: ACTIVE | Noted: 2024-10-09

## 2024-10-18 PROBLEM — R47.01 APHASIA: Chronic | Status: ACTIVE | Noted: 2024-10-09

## 2024-10-21 ENCOUNTER — OUTPATIENT (OUTPATIENT)
Dept: OUTPATIENT SERVICES | Facility: HOSPITAL | Age: 59
LOS: 1 days | End: 2024-10-21
Payer: MEDICARE

## 2024-10-21 VITALS
HEART RATE: 66 BPM | SYSTOLIC BLOOD PRESSURE: 120 MMHG | WEIGHT: 141.98 LBS | TEMPERATURE: 98 F | OXYGEN SATURATION: 95 % | HEIGHT: 63 IN | DIASTOLIC BLOOD PRESSURE: 77 MMHG | RESPIRATION RATE: 20 BRPM

## 2024-10-21 DIAGNOSIS — K08.199 COMPLETE LOSS OF TEETH DUE TO OTHER SPECIFIED CAUSE, UNSPECIFIED CLASS: Chronic | ICD-10-CM

## 2024-10-21 DIAGNOSIS — R19.00 INTRA-ABDOMINAL AND PELVIC SWELLING, MASS AND LUMP, UNSPECIFIED SITE: ICD-10-CM

## 2024-10-21 DIAGNOSIS — Z01.818 ENCOUNTER FOR OTHER PREPROCEDURAL EXAMINATION: ICD-10-CM

## 2024-10-21 PROCEDURE — 99214 OFFICE O/P EST MOD 30 MIN: CPT | Mod: 25

## 2024-10-21 NOTE — H&P PST ADULT - NSANTHOSAYNRD_GEN_A_CORE
No. KARIE screening performed.  STOP BANG Legend: 0-2 = LOW Risk; 3-4 = INTERMEDIATE Risk; 5-8 = HIGH Risk
no shortness of breath.

## 2024-10-21 NOTE — H&P PST ADULT - REASON FOR ADMISSION
Patient is a 59  year old  male presenting to PAST in preparation for   ROBOTIC POSSIBLE LAPAROSCOPIC POSSIBLE OPEN LEFT ADRENALECTOMY AND RETROPERITONEAL MASS RESECTION AND DIAGNOSTIC LAPAROSCOPY on  10/30/24  under general anesthesia by Dr. palm

## 2024-10-21 NOTE — H&P PST ADULT - NSHP PST SURGERY DATE_DT_GEN_A_CORE
Emergency Department TeleTriage Encounter Note      CHIEF COMPLAINT    Chief Complaint   Patient presents with    Abdominal Pain     Started last night. Hx of Ulcerative Colitis    Nausea    Vomiting    Diarrhea       VITAL SIGNS   Initial Vitals [06/03/24 2140]   BP Pulse Resp Temp SpO2   (!) 140/89 94 16 97.5 °F (36.4 °C) 99 %      MAP       --            ALLERGIES    Review of patient's allergies indicates:  No Known Allergies    PROVIDER TRIAGE NOTE    52-year-old male presents with abdominal pain, nausea and vomiting.  Patient with a history of ulcerative colitis, feels as though he is having a flare.    ORDERS  Labs Reviewed - No data to display    ED Orders (720h ago, onward)      None              Virtual Visit Note: The provider triage portion of this emergency department evaluation and documentation was performed via [employer]ofxp, a HIPAA-compliant telemedicine application, in concert with a tele-presenter in the room. A face to face patient evaluation with one of my colleagues will occur once the patient is placed in an emergency department room.      DISCLAIMER: This note was prepared with [employer] voice recognition transcription software. Garbled syntax, mangled pronouns, and other bizarre constructions may be attributed to that software system.    
30-Oct-2024

## 2024-10-21 NOTE — H&P PST ADULT - NSICDXPASTMEDICALHX_GEN_ALL_CORE_FT
PAST MEDICAL HISTORY:  Dementia     Down's syndrome     History of amblyopia     History of weight loss     Nonverbal

## 2024-10-21 NOTE — H&P PST ADULT - HISTORY OF PRESENT ILLNESS
Patient recently seen by Dr. Dobbs in office on 9/5/24, as per office note left retroperitoneal mass found on 08/15/2024 ?  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  mri done last month  per mother  no interim changes in pt's medica; condition since last past visit    PATIENT / GUARDIAN CURRENTLY DENIES CHEST PAIN  SHORTNESS OF BREATH  PALPITATIONS,  DYSURIA, OR UPPER RESPIRATORY INFECTION IN PAST 2 WEEKS  Patient / Guardian verbalized understanding of instructions and was given the opportunity to ask questions and have them answered.  As per patient, this is their complete medical and surgical history, including medications both prescribed or over the counter.  written and verbal instructions with teach back on chlorhexidine shampoo provided,  pt verbalized understanding with returned demonstration    Anesthesia Alert  NO--Difficult Airway  NO--History of neck surgery or radiation  NO--Limited ROM of neck  NO--History of Malignant hyperthermia  NO--Personal or family history of Pseudocholinesterase deficiency   NO--Prior Anesthesia Complication  NO--Latex Allergy  NO--Loose teeth  NO--History of Rheumatoid Arthritis  NO--KARIE  NO--Bleeding risk  NO--Other_____  Mallampati airway: Class II     Revised Cardiac Risk Index for Pre-Operative Risk   RESULT SUMMARY:  0 points  Class I Risk    3.9 %  30-day risk of death, MI, or cardiac arrest            Duke Activity Status Index (DASI) from [email or web address]  .  RESULT SUMMARY:  12.75 points  The higher the score (maximum 58.2), the higher the functional status.    4.31 METs

## 2024-10-22 DIAGNOSIS — Z01.818 ENCOUNTER FOR OTHER PREPROCEDURAL EXAMINATION: ICD-10-CM

## 2024-10-29 NOTE — ASU PATIENT PROFILE, ADULT - FALL HARM RISK - RISK INTERVENTIONS
Assistance OOB with selected safe patient handling equipment/Assistance with ambulation/Communicate Fall Risk and Risk Factors to all staff, patient, and family/Discuss with provider need for PT consult/Monitor gait and stability/Reinforce activity limits and safety measures with patient and family/Visual Cue: Yellow wristband/Bed in lowest position, wheels locked, appropriate side rails in place/Call bell, personal items and telephone in reach/Instruct patient to call for assistance before getting out of bed or chair/Non-slip footwear when patient is out of bed/[place]on to call system/Physically safe environment - no spills, clutter or unnecessary equipment/Purposeful Proactive Rounding/Room/bathroom lighting operational, light cord in reach

## 2024-10-30 ENCOUNTER — APPOINTMENT (OUTPATIENT)
Dept: SURGERY | Facility: HOSPITAL | Age: 59
End: 2024-10-30

## 2024-10-30 ENCOUNTER — INPATIENT (INPATIENT)
Facility: HOSPITAL | Age: 59
LOS: 0 days | Discharge: HOME HEALTH | DRG: 392 | End: 2024-10-31
Attending: SURGERY | Admitting: SURGERY
Payer: MEDICARE

## 2024-10-30 ENCOUNTER — RESULT REVIEW (OUTPATIENT)
Age: 59
End: 2024-10-30

## 2024-10-30 VITALS
OXYGEN SATURATION: 100 % | HEART RATE: 66 BPM | HEIGHT: 63 IN | RESPIRATION RATE: 16 BRPM | TEMPERATURE: 98 F | WEIGHT: 141.98 LBS | SYSTOLIC BLOOD PRESSURE: 111 MMHG | DIASTOLIC BLOOD PRESSURE: 59 MMHG

## 2024-10-30 DIAGNOSIS — R19.00 INTRA-ABDOMINAL AND PELVIC SWELLING, MASS AND LUMP, UNSPECIFIED SITE: ICD-10-CM

## 2024-10-30 DIAGNOSIS — K08.199 COMPLETE LOSS OF TEETH DUE TO OTHER SPECIFIED CAUSE, UNSPECIFIED CLASS: Chronic | ICD-10-CM

## 2024-10-30 LAB
ANION GAP SERPL CALC-SCNC: 15 MMOL/L — HIGH (ref 7–14)
APTT BLD: 39.3 SEC — HIGH (ref 27–39.2)
BASOPHILS # BLD AUTO: 0.03 K/UL — SIGNIFICANT CHANGE UP (ref 0–0.2)
BASOPHILS NFR BLD AUTO: 0.4 % — SIGNIFICANT CHANGE UP (ref 0–1)
BUN SERPL-MCNC: 16 MG/DL — SIGNIFICANT CHANGE UP (ref 10–20)
CALCIUM SERPL-MCNC: 7.9 MG/DL — LOW (ref 8.4–10.4)
CHLORIDE SERPL-SCNC: 101 MMOL/L — SIGNIFICANT CHANGE UP (ref 98–110)
CO2 SERPL-SCNC: 19 MMOL/L — SIGNIFICANT CHANGE UP (ref 17–32)
CREAT SERPL-MCNC: 0.9 MG/DL — SIGNIFICANT CHANGE UP (ref 0.7–1.5)
EGFR: 100 ML/MIN/1.73M2 — SIGNIFICANT CHANGE UP
EOSINOPHIL # BLD AUTO: 0.02 K/UL — SIGNIFICANT CHANGE UP (ref 0–0.7)
EOSINOPHIL NFR BLD AUTO: 0.3 % — SIGNIFICANT CHANGE UP (ref 0–8)
GLUCOSE SERPL-MCNC: 169 MG/DL — HIGH (ref 70–99)
HCT VFR BLD CALC: 39.7 % — LOW (ref 42–52)
HGB BLD-MCNC: 13.1 G/DL — LOW (ref 14–18)
IMM GRANULOCYTES NFR BLD AUTO: 0.4 % — HIGH (ref 0.1–0.3)
INR BLD: 1.01 RATIO — SIGNIFICANT CHANGE UP (ref 0.65–1.3)
LYMPHOCYTES # BLD AUTO: 0.68 K/UL — LOW (ref 1.2–3.4)
LYMPHOCYTES # BLD AUTO: 8.8 % — LOW (ref 20.5–51.1)
MAGNESIUM SERPL-MCNC: 1.6 MG/DL — LOW (ref 1.8–2.4)
MCHC RBC-ENTMCNC: 32.4 PG — HIGH (ref 27–31)
MCHC RBC-ENTMCNC: 33 G/DL — SIGNIFICANT CHANGE UP (ref 32–37)
MCV RBC AUTO: 98.3 FL — HIGH (ref 80–94)
MONOCYTES # BLD AUTO: 0.08 K/UL — LOW (ref 0.1–0.6)
MONOCYTES NFR BLD AUTO: 1 % — LOW (ref 1.7–9.3)
NEUTROPHILS # BLD AUTO: 6.89 K/UL — HIGH (ref 1.4–6.5)
NEUTROPHILS NFR BLD AUTO: 89.1 % — HIGH (ref 42.2–75.2)
NRBC # BLD: 0 /100 WBCS — SIGNIFICANT CHANGE UP (ref 0–0)
PHOSPHATE SERPL-MCNC: 3.2 MG/DL — SIGNIFICANT CHANGE UP (ref 2.1–4.9)
PLATELET # BLD AUTO: 182 K/UL — SIGNIFICANT CHANGE UP (ref 130–400)
PMV BLD: 10.2 FL — SIGNIFICANT CHANGE UP (ref 7.4–10.4)
POTASSIUM SERPL-MCNC: 3.9 MMOL/L — SIGNIFICANT CHANGE UP (ref 3.5–5)
POTASSIUM SERPL-SCNC: 3.9 MMOL/L — SIGNIFICANT CHANGE UP (ref 3.5–5)
PROTHROM AB SERPL-ACNC: 11.9 SEC — SIGNIFICANT CHANGE UP (ref 9.95–12.87)
RBC # BLD: 4.04 M/UL — LOW (ref 4.7–6.1)
RBC # FLD: 13.9 % — SIGNIFICANT CHANGE UP (ref 11.5–14.5)
SODIUM SERPL-SCNC: 135 MMOL/L — SIGNIFICANT CHANGE UP (ref 135–146)
WBC # BLD: 7.73 K/UL — SIGNIFICANT CHANGE UP (ref 4.8–10.8)
WBC # FLD AUTO: 7.73 K/UL — SIGNIFICANT CHANGE UP (ref 4.8–10.8)

## 2024-10-30 PROCEDURE — 83735 ASSAY OF MAGNESIUM: CPT

## 2024-10-30 PROCEDURE — 88342 IMHCHEM/IMCYTCHM 1ST ANTB: CPT | Mod: 26

## 2024-10-30 PROCEDURE — 85025 COMPLETE CBC W/AUTO DIFF WBC: CPT

## 2024-10-30 PROCEDURE — 86900 BLOOD TYPING SEROLOGIC ABO: CPT

## 2024-10-30 PROCEDURE — 84100 ASSAY OF PHOSPHORUS: CPT

## 2024-10-30 PROCEDURE — 88341 IMHCHEM/IMCYTCHM EA ADD ANTB: CPT

## 2024-10-30 PROCEDURE — C9399: CPT

## 2024-10-30 PROCEDURE — 88360 TUMOR IMMUNOHISTOCHEM/MANUAL: CPT | Mod: 26,59

## 2024-10-30 PROCEDURE — 36415 COLL VENOUS BLD VENIPUNCTURE: CPT

## 2024-10-30 PROCEDURE — 88313 SPECIAL STAINS GROUP 2: CPT | Mod: 26

## 2024-10-30 PROCEDURE — 88360 TUMOR IMMUNOHISTOCHEM/MANUAL: CPT

## 2024-10-30 PROCEDURE — 86850 RBC ANTIBODY SCREEN: CPT

## 2024-10-30 PROCEDURE — 85730 THROMBOPLASTIN TIME PARTIAL: CPT

## 2024-10-30 PROCEDURE — 80053 COMPREHEN METABOLIC PANEL: CPT

## 2024-10-30 PROCEDURE — C1889: CPT

## 2024-10-30 PROCEDURE — 88307 TISSUE EXAM BY PATHOLOGIST: CPT | Mod: 26

## 2024-10-30 PROCEDURE — 85610 PROTHROMBIN TIME: CPT

## 2024-10-30 PROCEDURE — 86901 BLOOD TYPING SEROLOGIC RH(D): CPT

## 2024-10-30 PROCEDURE — S2900: CPT

## 2024-10-30 PROCEDURE — 60650 LAPAROSCOPY ADRENALECTOMY: CPT | Mod: 22,LT

## 2024-10-30 PROCEDURE — C9290: CPT

## 2024-10-30 PROCEDURE — 88313 SPECIAL STAINS GROUP 2: CPT

## 2024-10-30 PROCEDURE — C1751: CPT

## 2024-10-30 PROCEDURE — 88307 TISSUE EXAM BY PATHOLOGIST: CPT

## 2024-10-30 PROCEDURE — 88341 IMHCHEM/IMCYTCHM EA ADD ANTB: CPT | Mod: 26

## 2024-10-30 PROCEDURE — 88342 IMHCHEM/IMCYTCHM 1ST ANTB: CPT

## 2024-10-30 PROCEDURE — 80048 BASIC METABOLIC PNL TOTAL CA: CPT

## 2024-10-30 RX ORDER — PANTOPRAZOLE SODIUM 40 MG/1
40 TABLET, DELAYED RELEASE ORAL
Refills: 0 | Status: DISCONTINUED | OUTPATIENT
Start: 2024-10-30 | End: 2024-10-31

## 2024-10-30 RX ORDER — ACETAMINOPHEN 500 MG
650 TABLET ORAL EVERY 6 HOURS
Refills: 0 | Status: DISCONTINUED | OUTPATIENT
Start: 2024-10-30 | End: 2024-10-31

## 2024-10-30 RX ORDER — MAGNESIUM SULFATE IN 0.9% NACL 2 G/50 ML
2 INTRAVENOUS SOLUTION, PIGGYBACK (ML) INTRAVENOUS ONCE
Refills: 0 | Status: COMPLETED | OUTPATIENT
Start: 2024-10-30 | End: 2024-10-30

## 2024-10-30 RX ORDER — HYDROMORPHONE HCL/0.9% NACL/PF 6 MG/30 ML
0.5 PATIENT CONTROLLED ANALGESIA SYRINGE INTRAVENOUS
Refills: 0 | Status: DISCONTINUED | OUTPATIENT
Start: 2024-10-30 | End: 2024-10-30

## 2024-10-30 RX ORDER — DIAZEPAM 10 MG/1
2 FILM BUCCAL ONCE
Refills: 0 | Status: DISCONTINUED | OUTPATIENT
Start: 2024-10-30 | End: 2024-10-30

## 2024-10-30 RX ORDER — ONDANSETRON HYDROCHLORIDE 2 MG/ML
4 INJECTION, SOLUTION INTRAMUSCULAR; INTRAVENOUS ONCE
Refills: 0 | Status: DISCONTINUED | OUTPATIENT
Start: 2024-10-30 | End: 2024-10-30

## 2024-10-30 RX ORDER — MAG HYDROX/ALUMINUM HYD/SIMETH 200-200-20
10 SUSPENSION, ORAL (FINAL DOSE FORM) ORAL
Refills: 0 | DISCHARGE

## 2024-10-30 RX ORDER — ASCORBIC ACID 500 MG
500 TABLET ORAL DAILY
Refills: 0 | Status: DISCONTINUED | OUTPATIENT
Start: 2024-10-30 | End: 2024-10-31

## 2024-10-30 RX ORDER — ENOXAPARIN SODIUM 40MG/0.4ML
40 SYRINGE (ML) SUBCUTANEOUS EVERY 24 HOURS
Refills: 0 | Status: DISCONTINUED | OUTPATIENT
Start: 2024-10-30 | End: 2024-10-31

## 2024-10-30 RX ADMIN — Medication 650 MILLIGRAM(S): at 13:23

## 2024-10-30 RX ADMIN — Medication 25 GRAM(S): at 17:30

## 2024-10-30 RX ADMIN — Medication 40 MILLIGRAM(S): at 17:15

## 2024-10-30 RX ADMIN — Medication 500 MILLIGRAM(S): at 13:23

## 2024-10-30 RX ADMIN — Medication 0.5 MILLIGRAM(S): at 12:19

## 2024-10-30 RX ADMIN — Medication 650 MILLIGRAM(S): at 19:30

## 2024-10-30 RX ADMIN — DIAZEPAM 2 MILLIGRAM(S): 10 FILM BUCCAL at 12:30

## 2024-10-30 RX ADMIN — Medication 0.5 MILLIGRAM(S): at 12:07

## 2024-10-30 RX ADMIN — Medication 100 MILLILITER(S): at 13:26

## 2024-10-30 RX ADMIN — Medication 650 MILLIGRAM(S): at 14:15

## 2024-10-30 RX ADMIN — Medication 650 MILLIGRAM(S): at 22:39

## 2024-10-30 RX ADMIN — Medication 650 MILLIGRAM(S): at 22:38

## 2024-10-30 NOTE — BRIEF OPERATIVE NOTE - OPERATION/FINDINGS
Robot-assisted laparoscopic left adrenalectomy, left adrenal vein identified and endoclipped x2, adrenal arteries taken with vessel-sealer. Mass 4x4 cm no obvious invasion into adjacent organs although adhered to gerota's fascia.

## 2024-10-30 NOTE — CHART NOTE - NSCHARTNOTEFT_GEN_A_CORE
Post Operative Note  Patient: TANYA SANCHEZ 59y (1965) Male   MRN: [id number]  Location: [facility] PACU 001 A  Visit: 10-30-24 Inpatient  Date: 10-30-24 @ 16:23    Procedure: Left adrenal mass     S/P Robot-assisted laparoscopic left adrenalectomy        Subjective: Patient was agitated and combative in the PACU, received Ativan per the Anesthesiology Team, no longer agitated.  Nausea: no, Vomiting no, Ambulating: no, Flatus: no  Pain Assessment: Patient is complaining of *** pain that is appropriate for post-operative course.     Objective:  Vitals: T(F): 98.2 (10-30-24 @ 14:30), Max: 98.2 (10-30-24 @ 14:30)  HR: 80 (10-30-24 @ 15:30)  BP: 162/77 (10-30-24 @ 15:30) (108/53 - 162/77)  RR: 15 (10-30-24 @ 15:30)  SpO2: 98% (10-30-24 @ 15:30)  Vent Settings:     In:   IV Fluids: ascorbic acid 500 milliGRAM(s) Oral daily  lactated ringers. 1000 milliLiter(s) (100 mL/Hr) IV Continuous <Continuous>  magnesium sulfate  IVPB 2 Gram(s) IV Intermittent once      Physical Examination:  General Appearance: NAD  HEENT: EOMI  Heart: RRR  Lungs: adequate respiratory effort  Abdomen:  Soft, minimally tender, appropriate for post-op course, nondistended. No rigidity, guarding, or rebound tenderness.   MSK/Extremities: Warm & well-perfused. Peripheral pulses intact.  Skin: Warm, dry. No jaundice.   Incisions/Wounds: Dermabond on incisions, no signs of infection/active bleeding/drainage    Medications: [Standing]  acetaminophen     Tablet .. 650 milliGRAM(s) Oral every 6 hours  ascorbic acid 500 milliGRAM(s) Oral daily  enoxaparin Injectable 40 milliGRAM(s) SubCutaneous every 24 hours  HYDROmorphone  Injectable 0.5 milliGRAM(s) IV Push every 10 minutes PRN  lactated ringers. 1000 milliLiter(s) IV Continuous <Continuous>  magnesium sulfate  IVPB 2 Gram(s) IV Intermittent once  ondansetron Injectable 4 milliGRAM(s) IV Push once PRN  pantoprazole    Tablet 40 milliGRAM(s) Oral before breakfast    Medications: [PRN]  acetaminophen     Tablet .. 650 milliGRAM(s) Oral every 6 hours  ascorbic acid 500 milliGRAM(s) Oral daily  enoxaparin Injectable 40 milliGRAM(s) SubCutaneous every 24 hours  HYDROmorphone  Injectable 0.5 milliGRAM(s) IV Push every 10 minutes PRN  lactated ringers. 1000 milliLiter(s) IV Continuous <Continuous>  magnesium sulfate  IVPB 2 Gram(s) IV Intermittent once  ondansetron Injectable 4 milliGRAM(s) IV Push once PRN  pantoprazole    Tablet 40 milliGRAM(s) Oral before breakfast      DVT PROPHYLAXIS: enoxaparin Injectable 40 milliGRAM(s) SubCutaneous every 24 hours    GI PROPHYLAXIS: pantoprazole    Tablet 40 milliGRAM(s) Oral before breakfast    ANTICOAGULATION:   ANTIBIOTICS:        Labs:                        13.1   7.73  )-----------( 182      ( 30 Oct 2024 13:35 )             39.7     10-30    135  |  101  |  16  ----------------------------<  169[H]  3.9   |  19  |  0.9    Ca    7.9[L]      30 Oct 2024 13:35  Phos  3.2     10-30  Mg     1.6     10-30    TPro  6.4  /  Alb  3.5  /  TBili  0.2  /  DBili  x   /  AST  18  /  ALT  7   /  AlkPhos  89  10-30    PT/INR - ( 30 Oct 2024 13:35 )   PT: 11.90 sec;   INR: 1.01 ratio         PTT - ( 30 Oct 2024 13:35 )  PTT:39.3 sec        Imaging:  No post-op imaging studies    Assessment:  59yM s/p Robot-assisted laparoscopic left adrenalectomy    Plan:  - Regular diet   - No antibiotics.   - Monitor vitals  - Monitor post-op labs  - 8pm labs and replete as necessary  - Continue Pain Medications if necessary  - Encourage ambulation as tolerated    Date/Time: 10-30-24 @ 16:23

## 2024-10-30 NOTE — PRE-ANESTHESIA EVALUATION ADULT - NSANTHPMHFT_GEN_ALL_CORE
down syndrome, dementia, aphasic  per mother, no hx of cardiac conditions, no surgeries in past  no headaches, palpitations  no hx of MI/CVA down syndrome, dementia, aphasic  per mother, no hx of cardiac conditions, no surgeries in past  no headaches, palpitations  no hx of MI/CVA    Per surgery note - metabolic work up negative down syndrome, dementia, aphasic  per mother, no hx of cardiac conditions  no headaches, palpitations or symptoms leading up to procedure other than 20lb weight loss  no hx of MI/CVA    Per surgery note - metabolic work up negative

## 2024-10-30 NOTE — CHART NOTE - NSCHARTNOTEFT_GEN_A_CORE
PACU ANESTHESIA ADMISSION NOTE      Procedure: Robot-assisted laparoscopic left adrenalectomy      Post op diagnosis:  Left adrenal mass        ____  Intubated  TV:______       Rate: ______      FiO2: ______    __x__  Patent Airway    __x__  Full return of protective reflexes    ____  Full recovery from anesthesia / back to baseline status    Vitals:  T(C): 36.7 (10-30-24 @ 07:12), Max: 36.7 (10-30-24 @ 06:21)  HR: 66 (10-30-24 @ 07:12) (66 - 66)  BP: 111/59 (10-30-24 @ 07:12) (111/59 - 111/59)  RR: 16 (10-30-24 @ 07:12) (16 - 16)  SpO2: 100% (10-30-24 @ 07:12) (100% - 100%)    Mental Status:  __x__ Awake   ___x__ Alert   ___x__ Drowsy   _____ Sedated    Nausea/Vomiting:  __x__ NO  ______Yes,   See Post - Op Orders          Pain Scale (0-10):  _____    Treatment: ____ None    __x__ See Post - Op/PCA Orders    Post - Operative Fluids:   ____ Oral   __x__ See Post - Op Orders    Plan: Discharge:   ____Home       __x___Floor     _____Critical Care    _____  Other:_________________    Comments: Patient had smooth intraoperative event. Lower tooth loose, was dislodged with minimal pressure. Will inform mother.  PACU Vital signs: stable, per record PACU ANESTHESIA ADMISSION NOTE      Procedure: Robot-assisted laparoscopic left adrenalectomy      Post op diagnosis:  Left adrenal mass        ____  Intubated  TV:______       Rate: ______      FiO2: ______    __x__  Patent Airway    __x__  Full return of protective reflexes    ____  Full recovery from anesthesia / back to baseline status    Vitals:  T(C): 36.7 (10-30-24 @ 07:12), Max: 36.7 (10-30-24 @ 06:21)  HR: 66 (10-30-24 @ 07:12) (66 - 66)  BP: 111/59 (10-30-24 @ 07:12) (111/59 - 111/59)  RR: 16 (10-30-24 @ 07:12) (16 - 16)  SpO2: 100% (10-30-24 @ 07:12) (100% - 100%)    Mental Status:  __x__ Awake   ___x__ Alert   _____ Drowsy   _____ Sedated    Nausea/Vomiting:  __x__ NO  ______Yes,   See Post - Op Orders          Pain Scale (0-10):  _____    Treatment: ____ None    __x__ See Post - Op/PCA Orders    Post - Operative Fluids:   ____ Oral   __x__ See Post - Op Orders    Plan: Discharge:   ____Home       __x___Floor     _____Critical Care    _____  Other:_________________    Comments: Patient had smooth intraoperative event. Lower tooth loose, was dislodged with minimal pressure. Will inform mother. 30mcgs of precedex given over all in PACU due to agitation. Now comfortable.  PACU Vital signs: stable, per record PACU ANESTHESIA ADMISSION NOTE      Procedure: Robot-assisted laparoscopic left adrenalectomy      Post op diagnosis:  Left adrenal mass        ____  Intubated  TV:______       Rate: ______      FiO2: ______    __x__  Patent Airway    __x__  Full return of protective reflexes    ____  Full recovery from anesthesia / back to baseline status    Vitals:  T(C): 36.7 (10-30-24 @ 07:12), Max: 36.7 (10-30-24 @ 06:21)  HR: 66 (10-30-24 @ 07:12) (66 - 66)  BP: 111/59 (10-30-24 @ 07:12) (111/59 - 111/59)  RR: 16 (10-30-24 @ 07:12) (16 - 16)  SpO2: 100% (10-30-24 @ 07:12) (100% - 100%)    Mental Status:  __x__ Awake   ___x__ Alert   _____ Drowsy   _____ Sedated    Nausea/Vomiting:  __x__ NO  ______Yes,   See Post - Op Orders          Pain Scale (0-10):  _____    Treatment: ____ None    __x__ See Post - Op/PCA Orders    Post - Operative Fluids:   ____ Oral   __x__ See Post - Op Orders    Plan: Discharge:   ____Home       __x___Floor     _____Critical Care    _____  Other:_________________    Comments: Patient had smooth intraoperative event. Lower tooth loose, was dislodged with minimal pressure. Mother informed. 30mcgs of precedex given over all in PACU due to agitation. Now comfortable.  PACU Vital signs: stable, per record

## 2024-10-31 ENCOUNTER — TRANSCRIPTION ENCOUNTER (OUTPATIENT)
Age: 59
End: 2024-10-31

## 2024-10-31 VITALS
RESPIRATION RATE: 18 BRPM | TEMPERATURE: 98 F | DIASTOLIC BLOOD PRESSURE: 63 MMHG | OXYGEN SATURATION: 99 % | HEART RATE: 64 BPM | SYSTOLIC BLOOD PRESSURE: 102 MMHG

## 2024-10-31 RX ORDER — POLYETHYLENE GLYCOL 3350 17 G/17G
17 POWDER, FOR SOLUTION ORAL
Qty: 2 | Refills: 0
Start: 2024-10-31 | End: 2024-11-29

## 2024-10-31 RX ADMIN — Medication 650 MILLIGRAM(S): at 05:45

## 2024-10-31 RX ADMIN — Medication 500 MILLIGRAM(S): at 12:23

## 2024-10-31 RX ADMIN — PANTOPRAZOLE SODIUM 40 MILLIGRAM(S): 40 TABLET, DELAYED RELEASE ORAL at 05:47

## 2024-10-31 RX ADMIN — Medication 650 MILLIGRAM(S): at 12:23

## 2024-10-31 RX ADMIN — Medication 650 MILLIGRAM(S): at 05:46

## 2024-10-31 NOTE — DISCHARGE NOTE PROVIDER - HOSPITAL COURSE
59 year old male presents for scheduled robotic left adrenalectomy. Intraoperatively, robot-assisted laparoscopic left adrenalectomy, left adrenal vein identified and endoclipped x2, adrenal arteries taken with vessel-sealer. Mass 4x4 cm no obvious invasion into adjacent organs although adhered to gerota's fascia. Post operatively, patient is tolerating diet, pain is controlled, voiding. Patient is medically appropriate for discharge.

## 2024-10-31 NOTE — DISCHARGE NOTE PROVIDER - NSDCMRMEDTOKEN_GEN_ALL_CORE_FT
Maalox Max oral suspension: 10 milliliter(s) orally 3 times a week  MiraLax oral powder for reconstitution: 17 gram(s) orally 2 times a day  vitamin c one tablet orally daily: 1 tablet po daily

## 2024-10-31 NOTE — PROGRESS NOTE ADULT - ASSESSMENT
ASSESSMENT:  59M MPHx of down syndrome, dementia, diagnosed with left adrenal mass nonfunctional s/p robotic left adrenalectomy     POD#1     PLAN:  - Regular diet  - Tylenol IV for pain  - Ambulation        BLUE TEAM SPECTRA: [id number]

## 2024-10-31 NOTE — DISCHARGE NOTE PROVIDER - NSDCCPTREATMENT_GEN_ALL_CORE_FT
PRINCIPAL PROCEDURE  Procedure: Robot-assisted laparoscopic left adrenalectomy  Findings and Treatment:

## 2024-10-31 NOTE — PROGRESS NOTE ADULT - SUBJECTIVE AND OBJECTIVE BOX
GENERAL SURGERY PROGRESS NOTE    Patient: TANYA SANCHEZ , 59y ([birth year])Male   MRN: [id number]  Location: [address]  Visit: 10-30-24 Inpatient  Date: 10-31-24 @ 00:39    Hospital Day #: 2  Post-Op Day #: 1     Procedure/Dx/Injuries:  s/p robot-assisted laparoscopic left adrenalectomy     Events of past 24 hours:  Pt was seen and examen at the bedside. No acute events overnight. Pt is tolerating diet, pointing toward where the pain is, left side of the abdomen. Pt passed TOV     PAST MEDICAL & SURGICAL HISTORY:  Dementia  Down's syndrome  History of amblyopia  Nonverbal  History of weight loss  Loss of teeth due to extraction    Vitals:   T(F): 97.3 (10-30-24 @ 22:36), Max: 98.2 (10-30-24 @ 14:30)  HR: 75 (10-30-24 @ 22:36)  BP: 119/69 (10-30-24 @ 22:36)  RR: 19 (10-30-24 @ 22:36)  SpO2: 99% (10-30-24 @ 22:36)    Diet, Regular    Fluids:     I & O's:    PHYSICAL EXAM:  General: NAD, AAOx0, calm  HEENT: NCAT, EOMI  Cardiac: RRR  Respiratory: On RA, saturating well, normal respiratory effort  Abdomen: Soft, non-distended, non-tender, incision sites clean, dermabong over  Skin: Warm/dry, normal color, no jaundice  Incision/wound: healing well, clean, dry and intact    MEDICATIONS  (STANDING):  acetaminophen     Tablet .. 650 milliGRAM(s) Oral every 6 hours  ascorbic acid 500 milliGRAM(s) Oral daily  enoxaparin Injectable 40 milliGRAM(s) SubCutaneous every 24 hours  pantoprazole    Tablet 40 milliGRAM(s) Oral before breakfast    MEDICATIONS  (PRN):    DVT PROPHYLAXIS: enoxaparin Injectable 40 milliGRAM(s) SubCutaneous every 24 hours    GI PROPHYLAXIS: pantoprazole    Tablet 40 milliGRAM(s) Oral before breakfast    ANTICOAGULATION:   ANTIBIOTICS:      LAB/STUDIES:  Labs:  CAPILLARY BLOOD GLUCOSE                       13.1   7.73  )-----------( 182      ( 30 Oct 2024 13:35 )             39.7       Auto Neutrophil %: 89.1 % (10-30-24 @ 13:35)  Auto Immature Granulocyte %: 0.4 % (10-30-24 @ 13:35)  Auto Neutrophil %: 56.1 % (10-30-24 @ 09:35)  Auto Immature Granulocyte %: 0.2 % (10-30-24 @ 09:35)    10-30    135  |  101  |  16  ----------------------------<  169[H]  3.9   |  19  |  0.9      Calcium: 7.9 mg/dL (10-30-24 @ 13:35)      LFTs:             6.4  | 0.2  | 18       ------------------[89      ( 30 Oct 2024 09:35 )  3.5  | x    | 7             Coags:     11.90  ----< 1.01    ( 30 Oct 2024 13:35 )     39.3      Urinalysis Basic - ( 30 Oct 2024 13:35 )    Color: x / Appearance: x / SG: x / pH: x  Gluc: 169 mg/dL / Ketone: x  / Bili: x / Urobili: x   Blood: x / Protein: x / Nitrite: x   Leuk Esterase: x / RBC: x / WBC x   Sq Epi: x / Non Sq Epi: x / Bacteria: x

## 2024-10-31 NOTE — DISCHARGE NOTE PROVIDER - NSDCCPCAREPLAN_GEN_ALL_CORE_FT
PRINCIPAL DISCHARGE DIAGNOSIS  Diagnosis: Retroperitoneal mass  Assessment and Plan of Treatment: Activity: No heavy lifting > 10 lbs for 2 weeks. Avoid straining or excessive activity x 6 weeks.   Dressings: . Do not scrub wounds. You may shower but do not bathe. May use ice packs for pain and swelling.   Pain control: You may take over-the-counter tylenol and motrin three times per day with food for up to 3 days  Follow up: Please call the number provided to make an appointment with Dr. Dobbs in 1-2 weeks. Please call with any questions or concerns including fevers, worsening pain, pus from the wounds, or redness of the skin.

## 2024-10-31 NOTE — DISCHARGE NOTE PROVIDER - CARE PROVIDER_API CALL
Emily Dobbs  Complex General Surgical Oncology  [address] [facility], [address] [place] [address]  Phone: [phone]  Fax: [phone]  Follow Up Time: 2 weeks

## 2024-10-31 NOTE — DISCHARGE NOTE NURSING/CASE MANAGEMENT/SOCIAL WORK - PATIENT PORTAL LINK FT
You can access the FollowMyHealth Patient Portal offered by [facility] by registering at the following website: [email or web address]. By joining [email or web address]’s FollowMyHealth portal, you will also be able to view your health information using other applications (apps) compatible with our system.

## 2024-10-31 NOTE — DISCHARGE NOTE PROVIDER - NSDCFUSCHEDAPPT_GEN_ALL_CORE_FT
Bee Weinstein  [facility]  GENS[facility] [address]  Scheduled Appointment: 11/12/2024

## 2024-10-31 NOTE — DISCHARGE NOTE NURSING/CASE MANAGEMENT/SOCIAL WORK - FINANCIAL ASSISTANCE
[facility] provides services at a reduced cost to those who are determined to be eligible through [facility]’s financial assistance program. Information regarding [facility]’s financial assistance program can be found by going to [email or web address] or by calling [phone].

## 2024-10-31 NOTE — DISCHARGE NOTE NURSING/CASE MANAGEMENT/SOCIAL WORK - NSDCPETBCESMAN_GEN_ALL_CORE
If you are a smoker, it is important for your health to stop smoking. Please be aware that second hand smoke is also harmful.
99

## 2024-11-11 PROBLEM — D35.00 PHEOCHROMOCYTOMA: Status: ACTIVE | Noted: 2024-11-11

## 2024-11-12 ENCOUNTER — APPOINTMENT (OUTPATIENT)
Dept: SURGERY | Facility: CLINIC | Age: 59
End: 2024-11-12
Payer: MEDICARE

## 2024-11-12 VITALS — WEIGHT: 144 LBS | BODY MASS INDEX: 25.52 KG/M2 | HEIGHT: 63 IN

## 2024-11-12 DIAGNOSIS — D35.00 BENIGN NEOPLASM OF UNSPECIFIED ADRENAL GLAND: ICD-10-CM

## 2024-11-12 PROCEDURE — 99024 POSTOP FOLLOW-UP VISIT: CPT

## 2024-11-13 NOTE — PHYSICAL EXAM
[Normal] : supple, no neck mass and thyroid not enlarged [Normal Neck Lymph Nodes] : normal neck lymph nodes  [Normal Supraclavicular Lymph Nodes] : normal supraclavicular lymph nodes [Normal Groin Lymph Nodes] : normal groin lymph nodes [Normal Axillary Lymph Nodes] : normal axillary lymph nodes [Normal] : normal appearance, no rash, nodules, vesicles, ulcers, erythema [de-identified] : soft non tender, non distended

## 2024-11-13 NOTE — HISTORY OF PRESENT ILLNESS
[de-identified] : ROBYN REMY  is a pleasant 59 year old man  who came in 08/15/2024 for left retroperitoneal mass~  . He has Dr DANNY RAMIREZ as His PCP.  he was recently in hospital for abdominal pain with ct suggested mesenteric mass but mri suggested 2.8cm left retroperitoneal mass arising from adrenal with possible pheochromocytoma origin  he has Down syndrome, he came with his mom and home health aide  he takes senna and miralax he lost weight recently 20 pounds as per his mom  11/12/24 post operative visit [Post-Op Complications] : No post-op complications reported

## 2024-11-13 NOTE — HISTORY OF PRESENT ILLNESS
[de-identified] : ROBYN REMY  is a pleasant 59 year old man  who came in 08/15/2024 for left retroperitoneal mass~  . He has Dr DANNY RAMIREZ as His PCP.  he was recently in hospital for abdominal pain with ct suggested mesenteric mass but mri suggested 2.8cm left retroperitoneal mass arising from adrenal with possible pheochromocytoma origin  he has Down syndrome, he came with his mom and home health aide  he takes senna and miralax he lost weight recently 20 pounds as per his mom  11/12/24 post operative visit [Post-Op Complications] : No post-op complications reported

## 2024-11-13 NOTE — REASON FOR VISIT
[Family Member] : family member [de-identified] : left adrenalectomy and retroperitoneal mass resection [de-identified] : 10/30/24

## 2024-11-13 NOTE — PHYSICAL EXAM
[Normal] : supple, no neck mass and thyroid not enlarged [Normal Neck Lymph Nodes] : normal neck lymph nodes  [Normal Supraclavicular Lymph Nodes] : normal supraclavicular lymph nodes [Normal Groin Lymph Nodes] : normal groin lymph nodes [Normal Axillary Lymph Nodes] : normal axillary lymph nodes [Normal] : normal appearance, no rash, nodules, vesicles, ulcers, erythema [de-identified] : soft non tender, non distended

## 2024-11-13 NOTE — REASON FOR VISIT
[Family Member] : family member [de-identified] : left adrenalectomy and retroperitoneal mass resection [de-identified] : 10/30/24

## 2024-11-13 NOTE — ASSESSMENT
[FreeTextEntry1] : ROBYN REMY  is a pleasant 59 year old man  who came in 08/15/2024 for left retroperitoneal mass~  . He has Dr DANNY RAMIREZ as His PCP.  he was recently in hospital for abdominal pain with ct suggested mesenteric mass but mri suggested 2.8cm left retroperitoneal mass arising from adrenal with possible pheochromocytoma origin  he has Down syndrome, he came with his mom and home health aide  he takes senna and miralax he lost weight recently 20 pounds as per his mom   will send for metabolic work up for his adrenal mass, will send for tumor markers for his weight loss, will see in couple week   09/05/2024 : no new reported symptoms, exam is unchanged, metabolic work up is negative, will send for MIBG scan as gallium dotatate was not feasible given his need for anasthesia   11/12/24 returns today post excision of left adrenal and retroperitoneal mass resection on October 30, 2024. Abdomen soft non tender, non -distended. Doing well per mother. Activity at baseline. Path reviewed with mother. 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 See note. - Surgical margins are negative for tumor (< 1mm from inked resection margin).  Plan -> medical oncology evaluation Will see in office in four months Encouraged to call office with any questions or concerns the above plan of care with discussed in details to the patient and all questions were answered to patient satisfaction. patient instructed to follow up with the referring physician and patient primary care provider   A total of   minutes was spent on this visit, obtaining h/p,  reviewing previous notes/imaging, counseling the patient on coming procedure and f/u , ordering tests (below), and documenting the findings in the note.

## 2024-11-15 DIAGNOSIS — D20.0 BENIGN NEOPLASM OF SOFT TISSUE OF RETROPERITONEUM: ICD-10-CM

## 2024-11-15 DIAGNOSIS — D35.02 BENIGN NEOPLASM OF LEFT ADRENAL GLAND: ICD-10-CM

## 2024-11-15 DIAGNOSIS — K08.409 PARTIAL LOSS OF TEETH, UNSPECIFIED CAUSE, UNSPECIFIED CLASS: ICD-10-CM

## 2024-11-15 DIAGNOSIS — Q90.9 DOWN SYNDROME, UNSPECIFIED: ICD-10-CM

## 2024-11-15 DIAGNOSIS — R63.4 ABNORMAL WEIGHT LOSS: ICD-10-CM

## 2024-11-15 DIAGNOSIS — H53.009 UNSPECIFIED AMBLYOPIA, UNSPECIFIED EYE: ICD-10-CM

## 2024-11-15 DIAGNOSIS — R19.00 INTRA-ABDOMINAL AND PELVIC SWELLING, MASS AND LUMP, UNSPECIFIED SITE: ICD-10-CM

## 2024-12-02 PROBLEM — Q90.9 DOWN SYNDROME: Status: ACTIVE | Noted: 2024-12-02

## 2024-12-02 PROBLEM — D35.02 PHEOCHROMOCYTOMA OF LEFT ADRENAL GLAND: Status: ACTIVE | Noted: 2024-12-02

## 2024-12-02 NOTE — CONSULT LETTER
[Dear  ___] : Dear  [unfilled], [Consult Letter:] : I had the pleasure of evaluating your patient, [unfilled]. [Please see my note below.] : Please see my note below. [Consult Closing:] : Thank you very much for allowing me to participate in the care of this patient.  If you have any questions, please do not hesitate to contact me. [Sincerely,] : Sincerely, [DrTee  ___] : Dr. LAROSE

## 2024-12-05 ENCOUNTER — APPOINTMENT (OUTPATIENT)
Age: 59
End: 2024-12-05
Payer: MEDICARE

## 2024-12-05 ENCOUNTER — OUTPATIENT (OUTPATIENT)
Dept: OUTPATIENT SERVICES | Facility: HOSPITAL | Age: 59
LOS: 1 days | End: 2024-12-05
Payer: MEDICARE

## 2024-12-05 VITALS
WEIGHT: 140.5 LBS | TEMPERATURE: 97.7 F | RESPIRATION RATE: 16 BRPM | SYSTOLIC BLOOD PRESSURE: 103 MMHG | DIASTOLIC BLOOD PRESSURE: 72 MMHG | BODY MASS INDEX: 24.89 KG/M2 | HEIGHT: 63 IN

## 2024-12-05 DIAGNOSIS — Z83.3 FAMILY HISTORY OF DIABETES MELLITUS: ICD-10-CM

## 2024-12-05 DIAGNOSIS — Z84.89 FAMILY HISTORY OF OTHER SPECIFIED CONDITIONS: ICD-10-CM

## 2024-12-05 DIAGNOSIS — Z82.49 FAMILY HISTORY OF ISCHEMIC HEART DISEASE AND OTHER DISEASES OF THE CIRCULATORY SYSTEM: ICD-10-CM

## 2024-12-05 DIAGNOSIS — Q90.9 DOWN SYNDROME, UNSPECIFIED: ICD-10-CM

## 2024-12-05 DIAGNOSIS — R19.00 INTRA-ABDOMINAL AND PELVIC SWELLING, MASS AND LUMP, UNSPECIFIED SITE: ICD-10-CM

## 2024-12-05 DIAGNOSIS — K08.199 COMPLETE LOSS OF TEETH DUE TO OTHER SPECIFIED CAUSE, UNSPECIFIED CLASS: Chronic | ICD-10-CM

## 2024-12-05 DIAGNOSIS — Z80.6 FAMILY HISTORY OF LEUKEMIA: ICD-10-CM

## 2024-12-05 DIAGNOSIS — D35.02 BENIGN NEOPLASM OF LEFT ADRENAL GLAND: ICD-10-CM

## 2024-12-05 DIAGNOSIS — F03.90 UNSPECIFIED DEMENTIA W/OUT BEHAVIORAL DISTURBANCE: ICD-10-CM

## 2024-12-05 PROCEDURE — G2211 COMPLEX E/M VISIT ADD ON: CPT

## 2024-12-05 PROCEDURE — 99205 OFFICE O/P NEW HI 60 MIN: CPT

## 2024-12-05 NOTE — RESULTS/DATA
[FreeTextEntry1] : Shae Accession Number : [id number] Patient:     ROBYN REMY Accession:                             [id number] Collected Date/Time:                   10/30/2024 11:15 EDT Received Date/Time:                    10/30/2024 13:22 EDT  Addendum Report - Auth (Verified)  Addendum This addendum is generated to report the results of immunohistochemical stains performed at [facility] on Block 1C with appropriate working control:  SDHB (succinate dehydrogenase B): positive in tumor cells. SSTR2 (somatostatin receptor 2): positive in tumor cells.  Clinical follow up is recommended. There is no change in original diagnosis.  Verified by: Regine Prajapati M.D. (Electronic Signature) Reported on: 11/07/24 21:50 [facility], [facility], [address] [place] [address] Phone: [phone]   Fax: [phone] _________________________________________________________________  Surgical Pathology Report - Auth (Verified) Specimen(s) Submitted Left adrenalectomy and retroperitoneal mass dissection  Final Diagnosis Adrenal gland, left, adrenalectomy and retroperitoneal mass resection: - Pheochromocytoma, moderately-differentiated (2.5 cm). See note. - Surgical margins are negative for tumor (< 1mm from inked resection margin).  Note:  The tumor shows zellballen pattern with moderate cellularity. Comedo necrosis, vascular invasion or capsular invasion are not identified. Proliferative index assessed by Ki-67 immunostain is 1-3%. Normetanephrine is elevated. The tumor is classified as moderately-differentiated Pheochromocytoma as per GAPP (Grading System for Adrenal Pheochromocytoma and Paraganglioma) of Kimura N, et al, with the score of 3 of 10 (histologic pattern - 0 points; cellularity - 1 point; comedo necrosis - 0 points; vascular or capsular invasion - 0 points; Ki-67 labeling index - 1 point; catecholamine type (Norepinephrine type - 1 point).  Immunohistochemically, the tumor is positive for synaptophysin, chromogranin, CD56 and GATA3 (weak), while negative for inhibin, SOX-10. S-100 immunostain highlights sustentacular cells. Reticulin highlights the zellballen pattern of growth.  Reference: Kimura N, Betsy R, Gregory N, et al. Pathological grading for predicting metastasis in pheochromocytoma and paraganglioma. Endocr Relat Cancer. 2014 May 6;21(3):405-414.  Note: The immunohistochemcial stains for SDHB (succinate dehydrogenase B) and SSTR2 (somatostatin receptor 2) is sent to [facility] and will be reported in an addendum. Meche Turner was notified of the diagnosis via [facility] email on 11/06/2024. Verified by: Regine Prajapati M.D. (Electronic Signature) Reported on: 11/06/24 ************************************************************ ACC: [id number] EXAM:  CT ABDOMEN AND PELVIS OC IC   ORDERED BY: ASH NEFF  PROCEDURE DATE:  07/03/2024   INTERPRETATION:  CLINICAL HISTORY / REASON FOR EXAM: Right lower quadrant pain. TECHNIQUE: Contiguous axial CT images were obtained from the lower chest to the pubic symphysis following administration of 95 mL Omnipaque 350  intravenous contrast, 5 mL discarded. Oral contrast was not administered. Reformatted images in the coronal and sagittal planes were acquired.  COMPARISON CT: CT abdomen and pelvis from October 5, 2023 OTHER STUDIES USED FOR CORRELATION: None. FINDINGS: LOWER CHEST: Bilateral lower lobe mosaic groundglass attenuation. HEPATOBILIARY: Left hepatic lobe cyst, stable. SPLEEN: Unremarkable. PANCREAS: Unremarkable. ADRENAL GLANDS: Unremarkable. KIDNEYS: Symmetric enhancement bilaterally. No evidence of hydronephrosis. ABDOMINOPELVIC NODES: Unremarkable. PELVIC ORGANS: Unremarkable.  PERITONEUM/MESENTERY/BOWEL: Mesenteric lesion adjacent to the small bowel measuring up to 2.9 x 2.7 cm (series 5, image 121). Oral contrast reaches  the cecum. No bowel obstruction, ascites or intraperitoneal free air. The appendix is not definitively visualized. No pericecal inflammatory changes.  BONES/SOFT TISSUES: No acute osseous abnormality.  VASCULAR: Aorta is normal in caliber. IMPRESSION: Lesion within the mesentery abutting the small bowel measuring approximately 2.9 x 2.7 cm which is indeterminate. Consider further  characterization with MRI as clinically indicated. --- End of Report --- DESTINI GOLDMAN MD; Attending Radiologist This document has been electronically signed. Jul  3 2024  6:42PM ************************************************************ ACC: [id number] EXAM:  CT ABDOMEN AND PELVIS IC   ORDERED BY: DION LYON  PROCEDURE DATE:  10/05/2023   INTERPRETATION:  CLINICAL STATEMENT: Abdominal pain TECHNIQUE: Contiguous axial CT images were obtained from the lower chest to the pubic symphysis following administration of intravenous contrast.   95 cc administered of Omnipaque 350 (5 cc discarded).  Oral contrast .  Reformatted images in the coronal and sagittal planes were acquired. COMPARISON CT: None. OTHER STUDIES USED FOR CORRELATION: None. FINDINGS: LOWER CHEST: Unremarkable. HEPATOBILIARY: 10 mm left hepatic cyst. SPLEEN: Unremarkable. PANCREAS: Unremarkable. ADRENAL GLANDS: Unremarkable. KIDNEYS: Unremarkable. ABDOMINOPELVIC NODES: Unremarkable.  PELVIC ORGANS: Underdistended thickened bladder wall. Correlate with urinalysis.  PERITONEUM/MESENTERY/BOWEL: No bowel obstruction, free fluid or free air. There is stool throughout the colon. The appendix is normal in appearance.  BONES/SOFT TISSUES: No acute osseous abnormality. OTHER: Bilateral gynecomastia.  IMPRESSION: No acute abnormality in the abdomen and pelvis. --- End of Report --- DAISHA OGDEN MD; Attending Radiologist This document has been electronically signed. Oct  5 2023  6:20PM *************************************************************** ACC: [id number] EXAM:  MR ABDOMEN IC   ORDERED BY: CECILIA TELLEZ  PROCEDURE DATE:  07/11/2024   INTERPRETATION:  CLINICAL INFORMATION: Mesenteric lesion COMPARISON: None. Correlation made with CT abdomen pelvis on 7/3/2024. CONTRAST/COMPLICATIONS: IV Contrast: Gadavist  10 cc administered   0 cc discarded Oral Contrast: NONE Complications: None reported at time of study completion  PROCEDURE: MRI of the abdomen was performed.  FINDINGS: LOWER CHEST: Bibasilar atelectasis/consolidation.  LIVER: Few scattered hepatic cysts. BILE DUCTS: Normal caliber. GALLBLADDER: Within normal limits. SPLEEN: Within normal limits. PANCREAS: Within normal limits. ADRENALS: 2.7 x 2.4 cm left retroperitoneal mass, probably arising from the adrenal gland demonstrates areas of T2 hyperintensity and postcontrast enhancement. KIDNEYS/URETERS: Within normal limits.  VISUALIZED PORTIONS: BOWEL: Within normal limits. PERITONEUM: No ascites. VESSELS: Within normal limits. RETROPERITONEUM/LYMPH NODES: No lymphadenopathy. ABDOMINAL WALL: Within normal limits. BONES: Within normal limits.  IMPRESSION: 2.7 cm left retroperitoneal mass, probably arising from the adrenal gland-suspicious for pheochromocytoma. --- End of Report --- DAISHA OGDEN MD; Attending Radiologist This document has been electronically signed. Jul 11 2024  3:24PM ***************************************************************

## 2024-12-05 NOTE — REVIEW OF SYSTEMS
[Loss of Hearing] : loss of hearing [Constipation] : constipation [Incontinence] : incontinence [Fever] : no fever [Chills] : no chills [Night Sweats] : no night sweats [Fatigue] : no fatigue [Recent Change In Weight] : ~T no recent weight change [Chest Pain] : no chest pain [Palpitations] : no palpitations [Cough] : no cough [SOB on Exertion] : no shortness of breath during exertion [Abdominal Pain] : no abdominal pain [Vomiting] : no vomiting [Dysuria] : no dysuria [Joint Pain] : no joint pain [Joint Stiffness] : no joint stiffness [Muscle Pain] : no muscle pain [Skin Rash] : no skin rash [Dizziness] : no dizziness [Difficulty Walking] : no difficulty walking [Anxiety] : no anxiety [Depression] : no depression [Muscle Weakness] : no muscle weakness [Easy Bruising] : no tendency for easy bruising [FreeTextEntry3] : diplopia from birth [FreeTextEntry8] : no blood, wears a diaper [de-identified] : NO HA, no falls

## 2024-12-05 NOTE — REVIEW OF SYSTEMS
[Loss of Hearing] : loss of hearing [Constipation] : constipation [Incontinence] : incontinence [Fever] : no fever [Chills] : no chills [Night Sweats] : no night sweats [Fatigue] : no fatigue [Recent Change In Weight] : ~T no recent weight change [Chest Pain] : no chest pain [Palpitations] : no palpitations [Cough] : no cough [SOB on Exertion] : no shortness of breath during exertion [Abdominal Pain] : no abdominal pain [Vomiting] : no vomiting [Dysuria] : no dysuria [Joint Pain] : no joint pain [Joint Stiffness] : no joint stiffness [Muscle Pain] : no muscle pain [Skin Rash] : no skin rash [Dizziness] : no dizziness [Difficulty Walking] : no difficulty walking [Anxiety] : no anxiety [Depression] : no depression [Muscle Weakness] : no muscle weakness [Easy Bruising] : no tendency for easy bruising [FreeTextEntry3] : diplopia from birth [FreeTextEntry8] : no blood, wears a diaper [de-identified] : NO HA, no falls

## 2024-12-05 NOTE — HISTORY OF PRESENT ILLNESS
[Disease: _____________________] : Disease: [unfilled] [T: ___] : T[unfilled] [N: ___] : N[unfilled] [M: ___] : M[unfilled] [AJCC Stage: ____] : AJCC Stage: [unfilled] [de-identified] : 12/5/2024... [place] is seen in consultation on 12/5/24, referred by Dr Meche Fletcher. He is accompanied by his mother and HHA.  This 59-year-old man has Down syndrome.  He was verbal until 2016, and he was able to make his needs known at that time.  At that time, he became less verbal and withdrawn and he was diagnosed with dementia.  He is mostly nonverbal at this time, and he has a 12-hour home health aide.  He lives at home with his mother, who is 85 years old with several chronic conditions.  During the day, he watches TV, and he may doze off at times but does not formally take a nap.  In better weather, he goes out with his home health aide and walks, but never alone.  His appetite is good.  There is no weight loss.  He feeds himself and has no difficulty in swallowing.  There is no apparent headache or chest pain.  There is no edema.  He is incontinent of urine and stool, and this has been the case for at least 3 to 4 years.  Previously, he would tell others that he needed to get to the bathroom.  He is not in any formal activities program.  He was admitted to the hospital with a complaint of pain, and an incidental finding was noted in the adrenal gland on a CAT scan.  He does not complain of any pain at this time.  He has not lost any weight.  There is no nausea or vomiting complaint.  He does have constipation and takes senna.  He did not have notable blood pressure elevations. In the evaluation of his adrenal mass, he did have an evaluation of hormone secretion.  The normetanephrine level was 296.8, with a stated range of 0-244.  Urinary metanephrines were normal.  He underwent a robotic left adrenalectomy on October 30.  His case was discussed at tumor board with Dr. Fletcher.    Hospital Course: Discharge Date 31-Oct-2024 Admission Date 30-Oct-2024 05:37 Hospital Course  59 year old male presents for scheduled robotic left adrenalectomy. Intraoperatively, robot-assisted laparoscopic left adrenalectomy, left adrenal vein identified and endoclipped x2, adrenal arteries taken with vessel-sealer. Mass 4x4 cm no obvious invasion into adjacent organs although adhered to Gerota's fascia. Post operatively, patient is tolerating diet, pain is controlled, voiding. Patient is medically appropriate for discharge.   [de-identified] : moderately-differentiated (2.5 cm). [de-identified] : SDHB (succinate dehydrogenase B): positive in tumor cells. SSTR2 (somatostatin receptor 2): positive in tumor cells. GAPP (Grading System for Adrenal Pheochromocytoma and Paraganglioma) of Kimura N, et al, with the score of 3 of 10 (histologic pattern - 0 points; cellularity - 1 point; comedo necrosis - 0 points; vascular or capsular invasion - 0 points; Ki-67 labeling index - 1 point; catecholamine type (Norepinephrine type - 1 point).

## 2024-12-05 NOTE — HISTORY OF PRESENT ILLNESS
[Disease: _____________________] : Disease: [unfilled] [T: ___] : T[unfilled] [N: ___] : N[unfilled] [M: ___] : M[unfilled] [AJCC Stage: ____] : AJCC Stage: [unfilled] [de-identified] : 12/5/2024... [place] is seen in consultation on 12/5/24, referred by Dr Meche Fletcher. He is accompanied by his mother and HHA.  This 59-year-old man has Down syndrome.  He was verbal until 2016, and he was able to make his needs known at that time.  At that time, he became less verbal and withdrawn and he was diagnosed with dementia.  He is mostly nonverbal at this time, and he has a 12-hour home health aide.  He lives at home with his mother, who is 85 years old with several chronic conditions.  During the day, he watches TV, and he may doze off at times but does not formally take a nap.  In better weather, he goes out with his home health aide and walks, but never alone.  His appetite is good.  There is no weight loss.  He feeds himself and has no difficulty in swallowing.  There is no apparent headache or chest pain.  There is no edema.  He is incontinent of urine and stool, and this has been the case for at least 3 to 4 years.  Previously, he would tell others that he needed to get to the bathroom.  He is not in any formal activities program.  He was admitted to the hospital with a complaint of pain, and an incidental finding was noted in the adrenal gland on a CAT scan.  He does not complain of any pain at this time.  He has not lost any weight.  There is no nausea or vomiting complaint.  He does have constipation and takes senna.  He did not have notable blood pressure elevations. In the evaluation of his adrenal mass, he did have an evaluation of hormone secretion.  The normetanephrine level was 296.8, with a stated range of 0-244.  Urinary metanephrines were normal.  He underwent a robotic left adrenalectomy on October 30.  His case was discussed at tumor board with Dr. Fletcher.    Hospital Course: Discharge Date 31-Oct-2024 Admission Date 30-Oct-2024 05:37 Hospital Course  59 year old male presents for scheduled robotic left adrenalectomy. Intraoperatively, robot-assisted laparoscopic left adrenalectomy, left adrenal vein identified and endoclipped x2, adrenal arteries taken with vessel-sealer. Mass 4x4 cm no obvious invasion into adjacent organs although adhered to Gerota's fascia. Post operatively, patient is tolerating diet, pain is controlled, voiding. Patient is medically appropriate for discharge.   [de-identified] : moderately-differentiated (2.5 cm). [de-identified] : SDHB (succinate dehydrogenase B): positive in tumor cells. SSTR2 (somatostatin receptor 2): positive in tumor cells. GAPP (Grading System for Adrenal Pheochromocytoma and Paraganglioma) of Kimura N, et al, with the score of 3 of 10 (histologic pattern - 0 points; cellularity - 1 point; comedo necrosis - 0 points; vascular or capsular invasion - 0 points; Ki-67 labeling index - 1 point; catecholamine type (Norepinephrine type - 1 point).

## 2024-12-05 NOTE — REASON FOR VISIT
[Initial Consultation] : an initial consultation [Parent] : parent [Formal Caregiver] : formal caregiver [FreeTextEntry2] : pheochromocytoma

## 2024-12-05 NOTE — HISTORY OF PRESENT ILLNESS
[Disease: _____________________] : Disease: [unfilled] [T: ___] : T[unfilled] [N: ___] : N[unfilled] [M: ___] : M[unfilled] [AJCC Stage: ____] : AJCC Stage: [unfilled] [de-identified] : 12/5/2024... Sotuh Gomes is seen in consultation on 12/5/24, referred by Dr Meche Fletcher. He is accompanied by his mother and HHA.  This 59-year-old man has Down syndrome.  He was verbal until 2016, and he was able to make his needs known at that time.  At that time, he became less verbal and withdrawn and he was diagnosed with dementia.  He is mostly nonverbal at this time, and he has a 12-hour home health aide.  He lives at home with his mother, who is 85 years old with several chronic conditions.  During the day, he watches TV, and he may doze off at times but does not formally take a nap.  In better weather, he goes out with his home health aide and walks, but never alone.  His appetite is good.  There is no weight loss.  He feeds himself and has no difficulty in swallowing.  There is no apparent headache or chest pain.  There is no edema.  He is incontinent of urine and stool, and this has been the case for at least 3 to 4 years.  Previously, he would tell others that he needed to get to the bathroom.  He is not in any formal activities program.  He was admitted to the hospital with a complaint of pain, and an incidental finding was noted in the adrenal gland on a CAT scan.  He does not complain of any pain at this time.  He has not lost any weight.  There is no nausea or vomiting complaint.  He does have constipation and takes senna.  He did not have notable blood pressure elevations. In the evaluation of his adrenal mass, he did have an evaluation of hormone secretion.  The normetanephrine level was 296.8, with a stated range of 0-244.  Urinary metanephrines were normal.  He underwent a robotic left adrenalectomy on October 30.  His case was discussed at tumor board with Dr. Fletcher.    Hospital Course: Discharge Date 31-Oct-2024 Admission Date 30-Oct-2024 05:37 Hospital Course  59 year old male presents for scheduled robotic left adrenalectomy. Intraoperatively, robot-assisted laparoscopic left adrenalectomy, left adrenal vein identified and endoclipped x2, adrenal arteries taken with vessel-sealer. Mass 4x4 cm no obvious invasion into adjacent organs although adhered to Gerota's fascia. Post operatively, patient is tolerating diet, pain is controlled, voiding. Patient is medically appropriate for discharge.   [de-identified] : moderately-differentiated (2.5 cm). [de-identified] : SDHB (succinate dehydrogenase B): positive in tumor cells. SSTR2 (somatostatin receptor 2): positive in tumor cells. GAPP (Grading System for Adrenal Pheochromocytoma and Paraganglioma) of Kimura N, et al, with the score of 3 of 10 (histologic pattern - 0 points; cellularity - 1 point; comedo necrosis - 0 points; vascular or capsular invasion - 0 points; Ki-67 labeling index - 1 point; catecholamine type (Norepinephrine type - 1 point).

## 2024-12-05 NOTE — ASSESSMENT
[Supportive] : Goals of care discussed with patient: Supportive [Palliative Care Plan] : not applicable at this time [FreeTextEntry1] : [place] Gomes is seen in the office in consultation today, accompanied by his mother and his home health aide.  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  There was no history of any symptoms associated with this tumor, and it was resected laparoscopically at the end of October, T1 N0 M0, stage I.  The tumor is classified as moderately-differentiated Pheochromocytoma as per GAPP (Grading System for Adrenal Pheochromocytoma and Paraganglioma) of Kimura N, et al, with the score of 3 of 10 (histologic pattern - 0 points; cellularity - 1 point; comedo necrosis - 0 points; vascular or capsular invasion - 0 points; Ki-67 labeling index - 1 point; catecholamine type (Norepinephrine type - 1 point). -------0-2 is low risk, of 3-6 is intermediate risk and of 7-10 is high risk.  He appears well at this time.  A comprehensive history was obtained, and a physical examination was performed.  Imaging studies were personally reviewed by me.  A discussion was conducted during tumor board recently.  He has a distinct aversion to having blood work performed, and he was uncooperative and lying down for physical examination of the abdomen.  I do not feel there is any need to perform any blood work at this particular time.  I discussed this tumor type with his mother and explained that it often can cause significant hypertension which can be episodic or sustained.  Some patients can present with renal damage as a result of uncontrolled hypertension as well as congestive heart failure and central nervous system stroke.  These risks are abated at this time given the resection of the tumor, and there was no issue intraoperatively.  Traditionally, it was felt that about 10% of patients with pheochromocytomas were thought to have familial disease, but in recent times the estimates are as high as 40%, and genetic counseling is recommended for patients with pheochromocytomas.  I discussed this with his mother.  In general, he would assess this for the health of other family members.  He does not have any children.  He does have a living brother and sister for which the information may be of value.  Whether or not he could cooperate to provide a saliva specimen is another issue.  I will reach out to the genetic counselor to discuss this.  In the past, it was assumed that the only way that 1 could tell if a pheochromocytoma was malignant was by the presence of metastases.  There has been the development of a model for estimation of recurrence risk after surgery.  This was published in the  Journal of endocrinology in , volume 186, page 399.  Variables independently associated with recurrent or tumor size, positive genetic testing (hazard ratio of 5.1) age, the pheochromocytoma of the adrenal gland scale score (PAS S) the overall performance of the model was with an AUC of 0.59 and the multifactorial assessment.  Additional risk factors on unit varied analysis included the presence of bilateral disease (hazard ratio 2.69), non-secretory status (hazard ratio 5.8) and family history, (hazard ratio 4.3)  Succinate dehydrogenase mutations are present often in familial pheochromocytoma syndromes.  The IHC staining for SDHB (succinate dehydrogenase B): positive in tumor cells, indicating no loss of this enzyme.  This is associated with a higher risk of metastases as well. Long-term follow-up is warranted, and the NCCN guidelines are as follows:  NCCN 12 wk-12 mo post-resection: - H&P, blood pressure, and plasma free or 24-hour urine fractionated metanephrines and normetanephrinesc,d,e (NE-E 2 of 4) - Consider chest CT (contrast) and abdomen/pelvis CT or MRI with contrast >1 y post-resection up to 10 y: - H&P, blood pressure, and markers (NE-E 2 of 4) Years 1-3: every 6-12 mop Years 4+ up to 10 y: annually - Consider chest CT (contrast) and abdomen/pelvis CT or MRI with contrast >10 y:  - Consider surveillance as clinically indicated  I have ordered a CT scan of the chest abdomen and pelvis to be done in January to serve as a new baseline after resection of tumor.  Unfortunately, he needs to have sedation to have the CT scan performed.  A recent paper published in the Journal of Clinical Endocrinology and Metabolism from  (volume 108, pages 397-404), was a retrospective analysis of pheochromocytoma and paraganglioma with a total of 1,127 patients.  Prevalence of recurrence amongst patients with sporadic tumors was 14.7% in comparison to those patients that had activation of pseudo hypoxia pathways for which the recurrence rate was 47.5%.  Among patients with sporadic recurrent tumors, 29% and 17.7% respectively diagnosed at least 10 and 15 years after the first diagnosis.  I have explained to his mother that there is no specific treatment that is required.  There is no adjuvant treatment.  He will be followed over the course of time.  For those patients that do have recurrence, the course is generally indolent, and debulking surgery has been utilized in some patients.  All questions were answered to the best of my ability and to his mother's apparent satisfaction.  I have requested that they return to see me in 3 months' time.  Due to his agitation with venipuncture, I have decided not to perform any blood work today.  The results of his recent blood work including his hospital stay were reviewed in the [facility] laboratory system at the point of care.  I spoke with the genetic counselor regarding the logistics of having testing done on this particular patient.  This would be a challenging endeavor with perhaps minimal impact.  He has no children.  He has 2 adult siblings and no one else in the family has a malignancy.  He did have a sister who  of leukemia at age 38 in the .  He would not be able to provide a saliva sample, and swab testing is no longer performed in most laboratories.  He could have testing by blood, and this can be considered at the time of the next visit when he has blood done for other reasons.  He does have an aversion to having blood drawn as well.

## 2024-12-05 NOTE — BEGINNING OF VISIT
[Medical reason not done] : Medical reason not done [de-identified] : Nonverbal/Down syndrome [Pain Scale: ___] : On a scale of 1-10, today the patient's pain is a(n) [unfilled]. [Future Reassessment of Pain Scale] : Future reassessment of pain scale [Never] : Never [Date Discussed (MM/DD/YY): ___] : Discussed: [unfilled] [With Patient/Caregiver] : with Patient/Caregiver

## 2024-12-05 NOTE — PHYSICAL EXAM
[Completely disabled. Cannot carry on any self care. Totally confined to bed or chair] : Status 4- Completely disabled. Cannot carry on any self care. Totally confined to bed or chair [Normal] : normal appearance, no rash, nodules, vesicles, ulcers, erythema [de-identified] : non-conjugate gaze, left eye inward gaze [de-identified] : no edema noted [de-identified] : Generally uncooperative with lying down, no distention or discomfort to palpation.  No masses [de-identified] : diplopia from birth, Down's features, gait measured, follows minimal directions, minimally verbal

## 2024-12-05 NOTE — PHYSICAL EXAM
[Completely disabled. Cannot carry on any self care. Totally confined to bed or chair] : Status 4- Completely disabled. Cannot carry on any self care. Totally confined to bed or chair [Normal] : normal appearance, no rash, nodules, vesicles, ulcers, erythema [de-identified] : non-conjugate gaze, left eye inward gaze [de-identified] : no edema noted [de-identified] : Generally uncooperative with lying down, no distention or discomfort to palpation.  No masses [de-identified] : diplopia from birth, Down's features, gait measured, follows minimal directions, minimally verbal

## 2024-12-05 NOTE — REVIEW OF SYSTEMS
[Loss of Hearing] : loss of hearing [Constipation] : constipation [Incontinence] : incontinence [Fever] : no fever [Chills] : no chills [Night Sweats] : no night sweats [Fatigue] : no fatigue [Recent Change In Weight] : ~T no recent weight change [Chest Pain] : no chest pain [Palpitations] : no palpitations [Cough] : no cough [SOB on Exertion] : no shortness of breath during exertion [Abdominal Pain] : no abdominal pain [Vomiting] : no vomiting [Dysuria] : no dysuria [Joint Pain] : no joint pain [Joint Stiffness] : no joint stiffness [Muscle Pain] : no muscle pain [Skin Rash] : no skin rash [Dizziness] : no dizziness [Difficulty Walking] : no difficulty walking [Anxiety] : no anxiety [Depression] : no depression [Muscle Weakness] : no muscle weakness [Easy Bruising] : no tendency for easy bruising [FreeTextEntry3] : diplopia from birth [FreeTextEntry8] : no blood, wears a diaper [de-identified] : NO HA, no falls

## 2024-12-05 NOTE — ASSESSMENT
[Supportive] : Goals of care discussed with patient: Supportive [Palliative Care Plan] : not applicable at this time [FreeTextEntry1] : [place] Gomes is seen in the office in consultation today, accompanied by his mother and his home health aide.  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  There was no history of any symptoms associated with this tumor, and it was resected laparoscopically at the end of October, T1 N0 M0, stage I.  The tumor is classified as moderately-differentiated Pheochromocytoma as per GAPP (Grading System for Adrenal Pheochromocytoma and Paraganglioma) of Kimura N, et al, with the score of 3 of 10 (histologic pattern - 0 points; cellularity - 1 point; comedo necrosis - 0 points; vascular or capsular invasion - 0 points; Ki-67 labeling index - 1 point; catecholamine type (Norepinephrine type - 1 point). -------0-2 is low risk, of 3-6 is intermediate risk and of 7-10 is high risk.  He appears well at this time.  A comprehensive history was obtained, and a physical examination was performed.  Imaging studies were personally reviewed by me.  A discussion was conducted during tumor board recently.  He has a distinct aversion to having blood work performed, and he was uncooperative and lying down for physical examination of the abdomen.  I do not feel there is any need to perform any blood work at this particular time.  I discussed this tumor type with his mother and explained that it often can cause significant hypertension which can be episodic or sustained.  Some patients can present with renal damage as a result of uncontrolled hypertension as well as congestive heart failure and central nervous system stroke.  These risks are abated at this time given the resection of the tumor, and there was no issue intraoperatively.  Traditionally, it was felt that about 10% of patients with pheochromocytomas were thought to have familial disease, but in recent times the estimates are as high as 40%, and genetic counseling is recommended for patients with pheochromocytomas.  I discussed this with his mother.  In general, he would assess this for the health of other family members.  He does not have any children.  He does have a living brother and sister for which the information may be of value.  Whether or not he could cooperate to provide a saliva specimen is another issue.  I will reach out to the genetic counselor to discuss this.  In the past, it was assumed that the only way that 1 could tell if a pheochromocytoma was malignant was by the presence of metastases.  There has been the development of a model for estimation of recurrence risk after surgery.  This was published in the  Journal of endocrinology in , volume 186, page 399.  Variables independently associated with recurrent or tumor size, positive genetic testing (hazard ratio of 5.1) age, the pheochromocytoma of the adrenal gland scale score (PAS S) the overall performance of the model was with an AUC of 0.59 and the multifactorial assessment.  Additional risk factors on unit varied analysis included the presence of bilateral disease (hazard ratio 2.69), non-secretory status (hazard ratio 5.8) and family history, (hazard ratio 4.3)  Succinate dehydrogenase mutations are present often in familial pheochromocytoma syndromes.  The IHC staining for SDHB (succinate dehydrogenase B): positive in tumor cells, indicating no loss of this enzyme.  This is associated with a higher risk of metastases as well. Long-term follow-up is warranted, and the NCCN guidelines are as follows:  NCCN 12 wk-12 mo post-resection: - H&P, blood pressure, and plasma free or 24-hour urine fractionated metanephrines and normetanephrinesc,d,e (NE-E 2 of 4) - Consider chest CT (contrast) and abdomen/pelvis CT or MRI with contrast >1 y post-resection up to 10 y: - H&P, blood pressure, and markers (NE-E 2 of 4) Years 1-3: every 6-12 mop Years 4+ up to 10 y: annually - Consider chest CT (contrast) and abdomen/pelvis CT or MRI with contrast >10 y:  - Consider surveillance as clinically indicated  I have ordered a CT scan of the chest abdomen and pelvis to be done in January to serve as a new baseline after resection of tumor.  Unfortunately, he needs to have sedation to have the CT scan performed.  A recent paper published in the Journal of Clinical Endocrinology and Metabolism from  (volume 108, pages 397-404), was a retrospective analysis of pheochromocytoma and paraganglioma with a total of 1,127 patients.  Prevalence of recurrence amongst patients with sporadic tumors was 14.7% in comparison to those patients that had activation of pseudo hypoxia pathways for which the recurrence rate was 47.5%.  Among patients with sporadic recurrent tumors, 29% and 17.7% respectively diagnosed at least 10 and 15 years after the first diagnosis.  I have explained to his mother that there is no specific treatment that is required.  There is no adjuvant treatment.  He will be followed over the course of time.  For those patients that do have recurrence, the course is generally indolent, and debulking surgery has been utilized in some patients.  All questions were answered to the best of my ability and to his mother's apparent satisfaction.  I have requested that they return to see me in 3 months' time.  Due to his agitation with venipuncture, I have decided not to perform any blood work today.  The results of his recent blood work including his hospital stay were reviewed in the [facility] laboratory system at the point of care.  I spoke with the genetic counselor regarding the logistics of having testing done on this particular patient.  This would be a challenging endeavor with perhaps minimal impact.  He has no children.  He has 2 adult siblings and no one else in the family has a malignancy.  He did have a sister who  of leukemia at age 38 in the .  He would not be able to provide a saliva sample, and swab testing is no longer performed in most laboratories.  He could have testing by blood, and this can be considered at the time of the next visit when he has blood done for other reasons.  He does have an aversion to having blood drawn as well.

## 2024-12-05 NOTE — PHYSICAL EXAM
[Completely disabled. Cannot carry on any self care. Totally confined to bed or chair] : Status 4- Completely disabled. Cannot carry on any self care. Totally confined to bed or chair [Normal] : normal appearance, no rash, nodules, vesicles, ulcers, erythema [de-identified] : non-conjugate gaze, left eye inward gaze [de-identified] : no edema noted [de-identified] : Generally uncooperative with lying down, no distention or discomfort to palpation.  No masses [de-identified] : diplopia from birth, Down's features, gait measured, follows minimal directions, minimally verbal

## 2024-12-05 NOTE — BEGINNING OF VISIT
[Medical reason not done] : Medical reason not done [de-identified] : Nonverbal/Down syndrome [Pain Scale: ___] : On a scale of 1-10, today the patient's pain is a(n) [unfilled]. [Future Reassessment of Pain Scale] : Future reassessment of pain scale [Never] : Never [Date Discussed (MM/DD/YY): ___] : Discussed: [unfilled] [With Patient/Caregiver] : with Patient/Caregiver

## 2024-12-05 NOTE — RESULTS/DATA
[FreeTextEntry1] : Shae Accession Number : [id number] Patient:     ROBYN REMY Accession:                             [id number] Collected Date/Time:                   10/30/2024 11:15 EDT Received Date/Time:                    10/30/2024 13:22 EDT  Addendum Report - Auth (Verified)  Addendum This addendum is generated to report the results of immunohistochemical stains performed at [facility] on [facility] with appropriate working control:  SDHB (succinate dehydrogenase B): positive in tumor cells. SSTR2 (somatostatin receptor 2): positive in tumor cells.  Clinical follow up is recommended. There is no change in original diagnosis.  Verified by: Regine Prajapati M.D. (Electronic Signature) Reported on: 11/07/24 21:50 [facility], [facility], [address] [place] [address] Phone: [phone]   Fax: [phone] _________________________________________________________________  Surgical Pathology Report - Auth (Verified) Specimen(s) Submitted Left adrenalectomy and retroperitoneal mass dissection  Final Diagnosis Adrenal gland, left, adrenalectomy and retroperitoneal mass resection: - Pheochromocytoma, moderately-differentiated (2.5 cm). See note. - Surgical margins are negative for tumor (< 1mm from inked resection margin).  Note:  The tumor shows zellballen pattern with moderate cellularity. Comedo necrosis, vascular invasion or capsular invasion are not identified. Proliferative index assessed by Ki-67 immunostain is 1-3%. Normetanephrine is elevated. The tumor is classified as moderately-differentiated Pheochromocytoma as per GAPP (Grading System for Adrenal Pheochromocytoma and Paraganglioma) of Kimura N, et al, with the score of 3 of 10 (histologic pattern - 0 points; cellularity - 1 point; comedo necrosis - 0 points; vascular or capsular invasion - 0 points; Ki-67 labeling index - 1 point; catecholamine type (Norepinephrine type - 1 point).  Immunohistochemically, the tumor is positive for synaptophysin, chromogranin, CD56 and GATA3 (weak), while negative for inhibin, SOX-10. S-100 immunostain highlights sustentacular cells. Reticulin highlights the zellballen pattern of growth.  Reference: Kimura N, Betsy R, Gregory N, et al. Pathological grading for predicting metastasis in pheochromocytoma and paraganglioma. Endocr Relat Cancer. 2014 May 6;21(3):405-414.  Note: The immunohistochemcial stains for SDHB (succinate dehydrogenase B) and SSTR2 (somatostatin receptor 2) is sent to [facility] and will be reported in an addendum. Meche Turner was notified of the diagnosis via [employer] email on 11/06/2024. Verified by: Regine Prajapati M.D. (Electronic Signature) Reported on: 11/06/24 ************************************************************ ACC: [id number] EXAM:  CT ABDOMEN AND PELVIS OC IC   ORDERED BY: ASH NEFF  PROCEDURE DATE:  07/03/2024   INTERPRETATION:  CLINICAL HISTORY / REASON FOR EXAM: Right lower quadrant pain. TECHNIQUE: Contiguous axial CT images were obtained from the lower chest to the pubic symphysis following administration of 95 mL Omnipaque 350  intravenous contrast, 5 mL discarded. Oral contrast was not administered. Reformatted images in the coronal and sagittal planes were acquired.  COMPARISON CT: CT abdomen and pelvis from October 5, 2023 OTHER STUDIES USED FOR CORRELATION: None. FINDINGS: LOWER CHEST: Bilateral lower lobe mosaic groundglass attenuation. HEPATOBILIARY: Left hepatic lobe cyst, stable. SPLEEN: Unremarkable. PANCREAS: Unremarkable. ADRENAL GLANDS: Unremarkable. KIDNEYS: Symmetric enhancement bilaterally. No evidence of hydronephrosis. ABDOMINOPELVIC NODES: Unremarkable. PELVIC ORGANS: Unremarkable.  PERITONEUM/MESENTERY/BOWEL: Mesenteric lesion adjacent to the small bowel measuring up to 2.9 x 2.7 cm (series 5, image 121). Oral contrast reaches  the cecum. No bowel obstruction, ascites or intraperitoneal free air. The appendix is not definitively visualized. No pericecal inflammatory changes.  BONES/SOFT TISSUES: No acute osseous abnormality.  VASCULAR: Aorta is normal in caliber. IMPRESSION: Lesion within the mesentery abutting the small bowel measuring approximately 2.9 x 2.7 cm which is indeterminate. Consider further  characterization with MRI as clinically indicated. --- End of Report --- DESTINI GOLDMAN MD; Attending Radiologist This document has been electronically signed. Jul  3 2024  6:42PM ************************************************************ ACC: [id number] EXAM:  CT ABDOMEN AND PELVIS IC   ORDERED BY: DION LYON  PROCEDURE DATE:  10/05/2023   INTERPRETATION:  CLINICAL STATEMENT: Abdominal pain TECHNIQUE: Contiguous axial CT images were obtained from the lower chest to the pubic symphysis following administration of intravenous contrast.   95 cc administered of Omnipaque 350 (5 cc discarded).  Oral contrast .  Reformatted images in the coronal and sagittal planes were acquired. COMPARISON CT: None. OTHER STUDIES USED FOR CORRELATION: None. FINDINGS: LOWER CHEST: Unremarkable. HEPATOBILIARY: 10 mm left hepatic cyst. SPLEEN: Unremarkable. PANCREAS: Unremarkable. ADRENAL GLANDS: Unremarkable. KIDNEYS: Unremarkable. ABDOMINOPELVIC NODES: Unremarkable.  PELVIC ORGANS: Underdistended thickened bladder wall. Correlate with urinalysis.  PERITONEUM/MESENTERY/BOWEL: No bowel obstruction, free fluid or free air. There is stool throughout the colon. The appendix is normal in appearance.  BONES/SOFT TISSUES: No acute osseous abnormality. OTHER: Bilateral gynecomastia.  IMPRESSION: No acute abnormality in the abdomen and pelvis. --- End of Report --- DAISHA OGDEN MD; Attending Radiologist This document has been electronically signed. Oct  5 2023  6:20PM *************************************************************** ACC: [id number] EXAM:  MR ABDOMEN IC   ORDERED BY: CECILIA TELLEZ  PROCEDURE DATE:  07/11/2024   INTERPRETATION:  CLINICAL INFORMATION: Mesenteric lesion COMPARISON: None. Correlation made with CT abdomen pelvis on 7/3/2024. CONTRAST/COMPLICATIONS: IV Contrast: Gadavist  10 cc administered   0 cc discarded Oral Contrast: NONE Complications: None reported at time of study completion  PROCEDURE: MRI of the abdomen was performed.  FINDINGS: LOWER CHEST: Bibasilar atelectasis/consolidation.  LIVER: Few scattered hepatic cysts. BILE DUCTS: Normal caliber. GALLBLADDER: Within normal limits. SPLEEN: Within normal limits. PANCREAS: Within normal limits. ADRENALS: 2.7 x 2.4 cm left retroperitoneal mass, probably arising from the adrenal gland demonstrates areas of T2 hyperintensity and postcontrast enhancement. KIDNEYS/URETERS: Within normal limits.  VISUALIZED PORTIONS: BOWEL: Within normal limits. PERITONEUM: No ascites. VESSELS: Within normal limits. RETROPERITONEUM/LYMPH NODES: No lymphadenopathy. ABDOMINAL WALL: Within normal limits. BONES: Within normal limits.  IMPRESSION: 2.7 cm left retroperitoneal mass, probably arising from the adrenal gland-suspicious for pheochromocytoma. --- End of Report --- DAISHA OGDEN MD; Attending Radiologist This document has been electronically signed. Jul 11 2024  3:24PM ***************************************************************

## 2024-12-05 NOTE — ASSESSMENT
[Supportive] : Goals of care discussed with patient: Supportive [Palliative Care Plan] : not applicable at this time [FreeTextEntry1] : [place] is seen in the office in consultation today, accompanied by his mother and his home health aide.  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  There was no history of any symptoms associated with this tumor, and it was resected laparoscopically at the end of October, T1 N0 M0, stage I.  The tumor is classified as moderately-differentiated Pheochromocytoma as per GAPP (Grading System for Adrenal Pheochromocytoma and Paraganglioma) of Kimura N, et al, with the score of 3 of 10 (histologic pattern - 0 points; cellularity - 1 point; comedo necrosis - 0 points; vascular or capsular invasion - 0 points; Ki-67 labeling index - 1 point; catecholamine type (Norepinephrine type - 1 point). -------0-2 is low risk, of 3-6 is intermediate risk and of 7-10 is high risk.  He appears well at this time.  A comprehensive history was obtained, and a physical examination was performed.  Imaging studies were personally reviewed by me.  A discussion was conducted during tumor board recently.  He has a distinct aversion to having blood work performed, and he was uncooperative and lying down for physical examination of the abdomen.  I do not feel there is any need to perform any blood work at this particular time.  I discussed this tumor type with his mother and explained that it often can cause significant hypertension which can be episodic or sustained.  Some patients can present with renal damage as a result of uncontrolled hypertension as well as congestive heart failure and central nervous system stroke.  These risks are abated at this time given the resection of the tumor, and there was no issue intraoperatively.  Traditionally, it was felt that about 10% of patients with pheochromocytomas were thought to have familial disease, but in recent times the estimates are as high as 40%, and genetic counseling is recommended for patients with pheochromocytomas.  I discussed this with his mother.  In general, he would assess this for the health of other family members.  He does not have any children.  He does have a living brother and sister for which the information may be of value.  Whether or not he could cooperate to provide a saliva specimen is another issue.  I will reach out to the genetic counselor to discuss this.  In the past, it was assumed that the only way that 1 could tell if a pheochromocytoma was malignant was by the presence of metastases.  There has been the development of a model for estimation of recurrence risk after surgery.  This was published in the  Journal of endocrinology in , volume 186, page 399.  Variables independently associated with recurrent or tumor size, positive genetic testing (hazard ratio of 5.1) age, the pheochromocytoma of the adrenal gland scale score (PAS S) the overall performance of the model was with an AUC of 0.59 and the multifactorial assessment.  Additional risk factors on unit varied analysis included the presence of bilateral disease (hazard ratio 2.69), non-secretory status (hazard ratio 5.8) and family history, (hazard ratio 4.3)  Succinate dehydrogenase mutations are present often in familial pheochromocytoma syndromes.  The IHC staining for SDHB (succinate dehydrogenase B): positive in tumor cells, indicating no loss of this enzyme.  This is associated with a higher risk of metastases as well. Long-term follow-up is warranted, and the NCCN guidelines are as follows:  NCCN 12 wk-12 mo post-resection: - H&P, blood pressure, and plasma free or 24-hour urine fractionated metanephrines and normetanephrinesc,d,e (NE-E 2 of 4) - Consider chest CT (contrast) and abdomen/pelvis CT or MRI with contrast >1 y post-resection up to 10 y: - H&P, blood pressure, and markers (NE-E 2 of 4) Years 1-3: every 6-12 mop Years 4+ up to 10 y: annually - Consider chest CT (contrast) and abdomen/pelvis CT or MRI with contrast >10 y:  - Consider surveillance as clinically indicated  I have ordered a CT scan of the chest abdomen and pelvis to be done in January to serve as a new baseline after resection of tumor.  Unfortunately, he needs to have sedation to have the CT scan performed.  A recent paper published in the Journal of Clinical Endocrinology and Metabolism from  (volume 108, pages 397-404), was a retrospective analysis of pheochromocytoma and paraganglioma with a total of 1,127 patients.  Prevalence of recurrence amongst patients with sporadic tumors was 14.7% in comparison to those patients that had activation of pseudo hypoxia pathways for which the recurrence rate was 47.5%.  Among patients with sporadic recurrent tumors, 29% and 17.7% respectively diagnosed at least 10 and 15 years after the first diagnosis.  I have explained to his mother that there is no specific treatment that is required.  There is no adjuvant treatment.  He will be followed over the course of time.  For those patients that do have recurrence, the course is generally indolent, and debulking surgery has been utilized in some patients.  All questions were answered to the best of my ability and to his mother's apparent satisfaction.  I have requested that they return to see me in 3 months' time.  Due to his agitation with venipuncture, I have decided not to perform any blood work today.  The results of his recent blood work including his hospital stay were reviewed in the [facility] laboratory system at the point of care.  I spoke with the genetic counselor regarding the logistics of having testing done on this particular patient.  This would be a challenging endeavor with perhaps minimal impact.  He has no children.  He has 2 adult siblings and no one else in the family has a malignancy.  He did have a sister who  of leukemia at age 38 in the .  He would not be able to provide a saliva sample, and swab testing is no longer performed in most laboratories.  He could have testing by blood, and this can be considered at the time of the next visit when he has blood done for other reasons.  He does have an aversion to having blood drawn as well.

## 2024-12-05 NOTE — BEGINNING OF VISIT
[Medical reason not done] : Medical reason not done [de-identified] : Nonverbal/Down syndrome [Pain Scale: ___] : On a scale of 1-10, today the patient's pain is a(n) [unfilled]. [Future Reassessment of Pain Scale] : Future reassessment of pain scale [Never] : Never [Date Discussed (MM/DD/YY): ___] : Discussed: [unfilled] [With Patient/Caregiver] : with Patient/Caregiver

## 2024-12-05 NOTE — RESULTS/DATA
[FreeTextEntry1] : Shae Accession Number : [id number] Patient:     ROBYN REMY Accession:                             [id number] Collected Date/Time:                   10/30/2024 11:15 EDT Received Date/Time:                    10/30/2024 13:22 EDT  Addendum Report - Auth (Verified)  Addendum This addendum is generated to report the results of immunohistochemical stains performed at [facility] on Block 1C with appropriate working control:  SDHB (succinate dehydrogenase B): positive in tumor cells. SSTR2 (somatostatin receptor 2): positive in tumor cells.  Clinical follow up is recommended. There is no change in original diagnosis.  Verified by: Regine Prajapati M.D. (Electronic Signature) Reported on: 11/07/24 21:50 [facility], [facility], [address] [place] [address] Phone: [phone]   Fax: [phone] _________________________________________________________________  Surgical Pathology Report - Auth (Verified) Specimen(s) Submitted Left adrenalectomy and retroperitoneal mass dissection  Final Diagnosis Adrenal gland, left, adrenalectomy and retroperitoneal mass resection: - Pheochromocytoma, moderately-differentiated (2.5 cm). See note. - Surgical margins are negative for tumor (< 1mm from inked resection margin).  Note:  The tumor shows zellballen pattern with moderate cellularity. Comedo necrosis, vascular invasion or capsular invasion are not identified. Proliferative index assessed by Ki-67 immunostain is 1-3%. Normetanephrine is elevated. The tumor is classified as moderately-differentiated Pheochromocytoma as per GAPP (Grading System for Adrenal Pheochromocytoma and Paraganglioma) of Kimura N, et al, with the score of 3 of 10 (histologic pattern - 0 points; cellularity - 1 point; comedo necrosis - 0 points; vascular or capsular invasion - 0 points; Ki-67 labeling index - 1 point; catecholamine type (Norepinephrine type - 1 point).  Immunohistochemically, the tumor is positive for synaptophysin, chromogranin, CD56 and GATA3 (weak), while negative for inhibin, SOX-10. S-100 immunostain highlights sustentacular cells. Reticulin highlights the zellballen pattern of growth.  Reference: Kimura N, Betsy R, Gregory N, et al. Pathological grading for predicting metastasis in pheochromocytoma and paraganglioma. Endocr Relat Cancer. 2014 May 6;21(3):405-414.  Note: The immunohistochemcial stains for SDHB (succinate dehydrogenase B) and SSTR2 (somatostatin receptor 2) is sent to [facility] and will be reported in an addendum. Meche Turner was notified of the diagnosis via [employer] email on 11/06/2024. Verified by: Regine Prajapati M.D. (Electronic Signature) Reported on: 11/06/24 ************************************************************ ACC: [id number] EXAM:  CT ABDOMEN AND PELVIS OC IC   ORDERED BY: ASH NEFF  PROCEDURE DATE:  07/03/2024   INTERPRETATION:  CLINICAL HISTORY / REASON FOR EXAM: Right lower quadrant pain. TECHNIQUE: Contiguous axial CT images were obtained from the lower chest to the pubic symphysis following administration of 95 mL Omnipaque 350  intravenous contrast, 5 mL discarded. Oral contrast was not administered. Reformatted images in the coronal and sagittal planes were acquired.  COMPARISON CT: CT abdomen and pelvis from October 5, 2023 OTHER STUDIES USED FOR CORRELATION: None. FINDINGS: LOWER CHEST: Bilateral lower lobe mosaic groundglass attenuation. HEPATOBILIARY: Left hepatic lobe cyst, stable. SPLEEN: Unremarkable. PANCREAS: Unremarkable. ADRENAL GLANDS: Unremarkable. KIDNEYS: Symmetric enhancement bilaterally. No evidence of hydronephrosis. ABDOMINOPELVIC NODES: Unremarkable. PELVIC ORGANS: Unremarkable.  PERITONEUM/MESENTERY/BOWEL: Mesenteric lesion adjacent to the small bowel measuring up to 2.9 x 2.7 cm (series 5, image 121). Oral contrast reaches  the cecum. No bowel obstruction, ascites or intraperitoneal free air. The appendix is not definitively visualized. No pericecal inflammatory changes.  BONES/SOFT TISSUES: No acute osseous abnormality.  VASCULAR: Aorta is normal in caliber. IMPRESSION: Lesion within the mesentery abutting the small bowel measuring approximately 2.9 x 2.7 cm which is indeterminate. Consider further  characterization with MRI as clinically indicated. --- End of Report --- DESTINI GOLDMAN MD; Attending Radiologist This document has been electronically signed. Jul  3 2024  6:42PM ************************************************************ ACC: [id number] EXAM:  CT ABDOMEN AND PELVIS IC   ORDERED BY: DION LYON  PROCEDURE DATE:  10/05/2023   INTERPRETATION:  CLINICAL STATEMENT: Abdominal pain TECHNIQUE: Contiguous axial CT images were obtained from the lower chest to the pubic symphysis following administration of intravenous contrast.   95 cc administered of Omnipaque 350 (5 cc discarded).  Oral contrast .  Reformatted images in the coronal and sagittal planes were acquired. COMPARISON CT: None. OTHER STUDIES USED FOR CORRELATION: None. FINDINGS: LOWER CHEST: Unremarkable. HEPATOBILIARY: 10 mm left hepatic cyst. SPLEEN: Unremarkable. PANCREAS: Unremarkable. ADRENAL GLANDS: Unremarkable. KIDNEYS: Unremarkable. ABDOMINOPELVIC NODES: Unremarkable.  PELVIC ORGANS: Underdistended thickened bladder wall. Correlate with urinalysis.  PERITONEUM/MESENTERY/BOWEL: No bowel obstruction, free fluid or free air. There is stool throughout the colon. The appendix is normal in appearance.  BONES/SOFT TISSUES: No acute osseous abnormality. OTHER: Bilateral gynecomastia.  IMPRESSION: No acute abnormality in the abdomen and pelvis. --- End of Report --- DAISHA OGDEN MD; Attending Radiologist This document has been electronically signed. Oct  5 2023  6:20PM *************************************************************** ACC: [id number] EXAM:  MR ABDOMEN IC   ORDERED BY: CECILIA TELLEZ  PROCEDURE DATE:  07/11/2024   INTERPRETATION:  CLINICAL INFORMATION: Mesenteric lesion COMPARISON: None. Correlation made with CT abdomen pelvis on 7/3/2024. CONTRAST/COMPLICATIONS: IV Contrast: Gadavist  10 cc administered   0 cc discarded Oral Contrast: NONE Complications: None reported at time of study completion  PROCEDURE: MRI of the abdomen was performed.  FINDINGS: LOWER CHEST: Bibasilar atelectasis/consolidation.  LIVER: Few scattered hepatic cysts. BILE DUCTS: Normal caliber. GALLBLADDER: Within normal limits. SPLEEN: Within normal limits. PANCREAS: Within normal limits. ADRENALS: 2.7 x 2.4 cm left retroperitoneal mass, probably arising from the adrenal gland demonstrates areas of T2 hyperintensity and postcontrast enhancement. KIDNEYS/URETERS: Within normal limits.  VISUALIZED PORTIONS: BOWEL: Within normal limits. PERITONEUM: No ascites. VESSELS: Within normal limits. RETROPERITONEUM/LYMPH NODES: No lymphadenopathy. ABDOMINAL WALL: Within normal limits. BONES: Within normal limits.  IMPRESSION: 2.7 cm left retroperitoneal mass, probably arising from the adrenal gland-suspicious for pheochromocytoma. --- End of Report --- DAISHA OGDEN MD; Attending Radiologist This document has been electronically signed. Jul 11 2024  3:24PM ***************************************************************

## 2024-12-06 ENCOUNTER — NON-APPOINTMENT (OUTPATIENT)
Age: 59
End: 2024-12-06

## 2024-12-06 DIAGNOSIS — R19.00 INTRA-ABDOMINAL AND PELVIC SWELLING, MASS AND LUMP, UNSPECIFIED SITE: ICD-10-CM

## 2025-03-10 NOTE — HISTORY OF PRESENT ILLNESS
[de-identified] : 12/5/2024... [place] is seen in consultation on 12/5/24, referred by Dr Meche Fletcher. He is accompanied by his mother and HHA.  This 59-year-old man has Down syndrome.  He was verbal until 2016, and he was able to make his needs known at that time.  At that time, he became less verbal and withdrawn and he was diagnosed with dementia.  He is mostly nonverbal at this time, and he has a 12-hour home health aide.  He lives at home with his mother, who is 85 years old with several chronic conditions.  During the day, he watches TV, and he may doze off at times but does not formally take a nap.  In better weather, he goes out with his home health aide and walks, but never alone.  His appetite is good.  There is no weight loss.  He feeds himself and has no difficulty in swallowing.  There is no apparent headache or chest pain.  There is no edema.  He is incontinent of urine and stool, and this has been the case for at least 3 to 4 years.  Previously, he would tell others that he needed to get to the bathroom.  He is not in any formal activities program.  He was admitted to the hospital with a complaint of pain, and an incidental finding was noted in the adrenal gland on a CAT scan.  He does not complain of any pain at this time.  He has not lost any weight.  There is no nausea or vomiting complaint.  He does have constipation and takes senna.  He did not have notable blood pressure elevations. In the evaluation of his adrenal mass, he did have an evaluation of hormone secretion.  The normetanephrine level was 296.8, with a stated range of 0-244.  Urinary metanephrines were normal.  He underwent a robotic left adrenalectomy on October 30.  His case was discussed at tumor board with Dr. Fletcher.    Hospital Course: Discharge Date 31-Oct-2024 Admission Date 30-Oct-2024 05:37 Hospital Course  59 year old male presents for scheduled robotic left adrenalectomy. Intraoperatively, robot-assisted laparoscopic left adrenalectomy, left adrenal vein identified and endoclipped x2, adrenal arteries taken with vessel-sealer. Mass 4x4 cm no obvious invasion into adjacent organs although adhered to Gerota's fascia. Post operatively, patient is tolerating diet, pain is controlled, voiding. Patient is medically appropriate for discharge.   [de-identified] : moderately-differentiated (2.5 cm). [de-identified] : SDHB (succinate dehydrogenase B): positive in tumor cells. SSTR2 (somatostatin receptor 2): positive in tumor cells. GAPP (Grading System for Adrenal Pheochromocytoma and Paraganglioma) of Kimura N, et al, with the score of 3 of 10 (histologic pattern - 0 points; cellularity - 1 point; comedo necrosis - 0 points; vascular or capsular invasion - 0 points; Ki-67 labeling index - 1 point; catecholamine type (Norepinephrine type - 1 point).

## 2025-03-10 NOTE — ASSESSMENT
[FreeTextEntry1] : [place] is seen in the office in consultation today, accompanied by his mother and his home health aide.  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  There was no history of any symptoms associated with this tumor, and it was resected laparoscopically at the end of October, T1 N0 M0, stage I.  The tumor is classified as moderately-differentiated Pheochromocytoma as per GAPP (Grading System for Adrenal Pheochromocytoma and Paraganglioma) of Kimura N, et al, with the score of 3 of 10 (histologic pattern - 0 points; cellularity - 1 point; comedo necrosis - 0 points; vascular or capsular invasion - 0 points; Ki-67 labeling index - 1 point; catecholamine type (Norepinephrine type - 1 point). -------0-2 is low risk, of 3-6 is intermediate risk and of 7-10 is high risk.  He appears well at this time.  A comprehensive history was obtained, and a physical examination was performed.  Imaging studies were personally reviewed by me.  A discussion was conducted during tumor board recently.  He has a distinct aversion to having blood work performed, and he was uncooperative and lying down for physical examination of the abdomen.  I do not feel there is any need to perform any blood work at this particular time.  I discussed this tumor type with his mother and explained that it often can cause significant hypertension which can be episodic or sustained.  Some patients can present with renal damage as a result of uncontrolled hypertension as well as congestive heart failure and central nervous system stroke.  These risks are abated at this time given the resection of the tumor, and there was no issue intraoperatively.  Traditionally, it was felt that about 10% of patients with pheochromocytomas were thought to have familial disease, but in recent times the estimates are as high as 40%, and genetic counseling is recommended for patients with pheochromocytomas.  I discussed this with his mother.  In general, he would assess this for the health of other family members.  He does not have any children.  He does have a living brother and sister for which the information may be of value.  Whether or not he could cooperate to provide a saliva specimen is another issue.  I will reach out to the genetic counselor to discuss this.  In the past, it was assumed that the only way that 1 could tell if a pheochromocytoma was malignant was by the presence of metastases.  There has been the development of a model for estimation of recurrence risk after surgery.  This was published in the  Journal of endocrinology in , volume 186, page 399.  Variables independently associated with recurrent or tumor size, positive genetic testing (hazard ratio of 5.1) age, the pheochromocytoma of the adrenal gland scale score (PAS S) the overall performance of the model was with an AUC of 0.59 and the multifactorial assessment.  Additional risk factors on unit varied analysis included the presence of bilateral disease (hazard ratio 2.69), non-secretory status (hazard ratio 5.8) and family history, (hazard ratio 4.3)  Succinate dehydrogenase mutations are present often in familial pheochromocytoma syndromes.  The IHC staining for SDHB (succinate dehydrogenase B): positive in tumor cells, indicating no loss of this enzyme.  This is associated with a higher risk of metastases as well. Long-term follow-up is warranted, and the NCCN guidelines are as follows:  NCCN 12 wk-12 mo post-resection: - H&P, blood pressure, and plasma free or 24-hour urine fractionated metanephrines and normetanephrinesc,d,e (NE-E 2 of 4) - Consider chest CT (contrast) and abdomen/pelvis CT or MRI with contrast >1 y post-resection up to 10 y: - H&P, blood pressure, and markers (NE-E 2 of 4) Years 1-3: every 6-12 mop Years 4+ up to 10 y: annually - Consider chest CT (contrast) and abdomen/pelvis CT or MRI with contrast >10 y:  - Consider surveillance as clinically indicated  I have ordered a CT scan of the chest abdomen and pelvis to be done in January to serve as a new baseline after resection of tumor.  Unfortunately, he needs to have sedation to have the CT scan performed.  A recent paper published in the Journal of Clinical Endocrinology and Metabolism from  (volume 108, pages 397-404), was a retrospective analysis of pheochromocytoma and paraganglioma with a total of 1,127 patients.  Prevalence of recurrence amongst patients with sporadic tumors was 14.7% in comparison to those patients that had activation of pseudo hypoxia pathways for which the recurrence rate was 47.5%.  Among patients with sporadic recurrent tumors, 29% and 17.7% respectively diagnosed at least 10 and 15 years after the first diagnosis.  I have explained to his mother that there is no specific treatment that is required.  There is no adjuvant treatment.  He will be followed over the course of time.  For those patients that do have recurrence, the course is generally indolent, and debulking surgery has been utilized in some patients.  All questions were answered to the best of my ability and to his mother's apparent satisfaction.  I have requested that they return to see me in 3 months' time.  Due to his agitation with venipuncture, I have decided not to perform any blood work today.  The results of his recent blood work including his hospital stay were reviewed in the [facility] laboratory system at the point of care.  I spoke with the genetic counselor regarding the logistics of having testing done on this particular patient.  This would be a challenging endeavor with perhaps minimal impact.  He has no children.  He has 2 adult siblings and no one else in the family has a malignancy.  He did have a sister who  of leukemia at age 38 in the .  He would not be able to provide a saliva sample, and swab testing is no longer performed in most laboratories.  He could have testing by blood, and this can be considered at the time of the next visit when he has blood done for other reasons.  He does have an aversion to having blood drawn as well.

## 2025-03-11 ENCOUNTER — APPOINTMENT (OUTPATIENT)
Dept: SURGERY | Facility: CLINIC | Age: 60
End: 2025-03-11
Payer: MEDICARE

## 2025-03-11 VITALS
DIASTOLIC BLOOD PRESSURE: 68 MMHG | HEIGHT: 63 IN | WEIGHT: 139 LBS | BODY MASS INDEX: 24.63 KG/M2 | SYSTOLIC BLOOD PRESSURE: 102 MMHG

## 2025-03-11 DIAGNOSIS — K59.00 CONSTIPATION, UNSPECIFIED: ICD-10-CM

## 2025-03-11 DIAGNOSIS — D35.02 BENIGN NEOPLASM OF LEFT ADRENAL GLAND: ICD-10-CM

## 2025-03-11 PROCEDURE — 99214 OFFICE O/P EST MOD 30 MIN: CPT

## 2025-03-14 ENCOUNTER — APPOINTMENT (OUTPATIENT)
Age: 60
End: 2025-03-14

## 2025-03-14 DIAGNOSIS — D35.00 BENIGN NEOPLASM OF UNSPECIFIED ADRENAL GLAND: ICD-10-CM

## 2025-03-14 DIAGNOSIS — Q90.9 DOWN SYNDROME, UNSPECIFIED: ICD-10-CM

## 2025-03-18 NOTE — ASSESSMENT
[FreeTextEntry1] : ROBYN REMY  is a pleasant 59 year old man  who came in 08/15/2024 for left retroperitoneal mass~  . He has Dr DANNY RAMIREZ as His PCP.  he was recently in hospital for abdominal pain with ct suggested mesenteric mass but mri suggested 2.8cm left retroperitoneal mass arising from adrenal with possible pheochromocytoma origin  he has Down syndrome, he came with his mom and home health aide  he takes senna and miralax he lost weight recently 20 pounds as per his mom   will send for metabolic work up for his adrenal mass, will send for tumor markers for his weight loss, will see in couple week   09/05/2024 : no new reported symptoms, exam is unchanged, metabolic work up is negative, will send for MIBG scan as gallium dotatate was not feasible given his need for anasthesia   11/12/24 returns today post excision of left adrenal and retroperitoneal mass resection on October 30, 2024. Abdomen soft non tender, non -distended. Doing well per mother. Activity at baseline. Path reviewed with mother. 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 See note. - Surgical margins are negative for tumor (< 1mm from inked resection margin).   03/11/2025 : reported constipation by mother,  exam is unchanged, needs to follow up with primary care physician and medical oncology to have labs and CT, mother indicated that this is a challenge to be done given his resistance to labs and CT and need for sedation to have these done  Plan -> medical oncology evaluation Will see in office in four months Encouraged to call office with any questions or concerns the above plan of care with discussed in details to the patient and all questions were answered to patient satisfaction. patient instructed to follow up with the referring physician and patient primary care provider   A total of    30 minutes was spent on this visit, obtaining h/p,  reviewing previous notes/imaging, counseling the patient on coming procedure and f/u , ordering tests (below), and documenting the findings in the note.

## 2025-03-18 NOTE — HISTORY OF PRESENT ILLNESS
[de-identified] : ROBYN REMY  is a pleasant 59 year old man  who came in 08/15/2024 for left retroperitoneal mass~  . He has Dr DANNY RAMIREZ as His PCP.  he was recently in hospital for abdominal pain with ct suggested mesenteric mass but mri suggested 2.8cm left retroperitoneal mass arising from adrenal with possible pheochromocytoma origin  he has Down syndrome, he came with his mom and home health aide  he takes senna and miralax he lost weight recently 20 pounds as per his mom  11/12/24 post operative visit [Post-Op Complications] : No post-op complications reported

## 2025-03-18 NOTE — PHYSICAL EXAM
[Normal] : supple, no neck mass and thyroid not enlarged [Normal Neck Lymph Nodes] : normal neck lymph nodes  [Normal Supraclavicular Lymph Nodes] : normal supraclavicular lymph nodes [Normal Groin Lymph Nodes] : normal groin lymph nodes [Normal Axillary Lymph Nodes] : normal axillary lymph nodes [Normal] : normal appearance, no rash, nodules, vesicles, ulcers, erythema [de-identified] : soft non tender, non distended

## 2025-03-18 NOTE — REASON FOR VISIT
[Family Member] : family member [de-identified] : left adrenalectomy and retroperitoneal mass resection [de-identified] : 10/30/24

## 2025-03-18 NOTE — PHYSICAL EXAM
[Normal] : supple, no neck mass and thyroid not enlarged [Normal Neck Lymph Nodes] : normal neck lymph nodes  [Normal Supraclavicular Lymph Nodes] : normal supraclavicular lymph nodes [Normal Groin Lymph Nodes] : normal groin lymph nodes [Normal Axillary Lymph Nodes] : normal axillary lymph nodes [Normal] : normal appearance, no rash, nodules, vesicles, ulcers, erythema [de-identified] : soft non tender, non distended

## 2025-03-18 NOTE — HISTORY OF PRESENT ILLNESS
[de-identified] : ROBYN REMY  is a pleasant 59 year old man  who came in 08/15/2024 for left retroperitoneal mass~  . He has Dr DANNY RAMIREZ as His PCP.  he was recently in hospital for abdominal pain with ct suggested mesenteric mass but mri suggested 2.8cm left retroperitoneal mass arising from adrenal with possible pheochromocytoma origin  he has Down syndrome, he came with his mom and home health aide  he takes senna and miralax he lost weight recently 20 pounds as per his mom  11/12/24 post operative visit [Post-Op Complications] : No post-op complications reported

## 2025-03-18 NOTE — REASON FOR VISIT
[Family Member] : family member [de-identified] : left adrenalectomy and retroperitoneal mass resection [de-identified] : 10/30/24

## 2025-04-21 ENCOUNTER — APPOINTMENT (OUTPATIENT)
Dept: PEDIATRIC DEVELOPMENTAL SERVICES | Facility: CLINIC | Age: 60
End: 2025-04-21
Payer: MEDICARE

## 2025-04-21 ENCOUNTER — OUTPATIENT (OUTPATIENT)
Dept: OUTPATIENT SERVICES | Facility: HOSPITAL | Age: 60
LOS: 1 days | End: 2025-04-21
Payer: MEDICARE

## 2025-04-21 VITALS
OXYGEN SATURATION: 97 % | TEMPERATURE: 97.2 F | WEIGHT: 141.06 LBS | SYSTOLIC BLOOD PRESSURE: 99 MMHG | DIASTOLIC BLOOD PRESSURE: 63 MMHG | HEIGHT: 63 IN | BODY MASS INDEX: 24.99 KG/M2 | HEART RATE: 70 BPM

## 2025-04-21 DIAGNOSIS — Z00.00 ENCOUNTER FOR GENERAL ADULT MEDICAL EXAMINATION W/OUT ABNORMAL FINDINGS: ICD-10-CM

## 2025-04-21 DIAGNOSIS — Q90.9 DOWN SYNDROME, UNSPECIFIED: ICD-10-CM

## 2025-04-21 DIAGNOSIS — F03.90 UNSPECIFIED DEMENTIA W/OUT BEHAVIORAL DISTURBANCE: ICD-10-CM

## 2025-04-21 DIAGNOSIS — H61.23 IMPACTED CERUMEN, BILATERAL: ICD-10-CM

## 2025-04-21 DIAGNOSIS — D35.02 BENIGN NEOPLASM OF LEFT ADRENAL GLAND: ICD-10-CM

## 2025-04-21 DIAGNOSIS — Z00.00 ENCOUNTER FOR GENERAL ADULT MEDICAL EXAMINATION WITHOUT ABNORMAL FINDINGS: ICD-10-CM

## 2025-04-21 DIAGNOSIS — K59.00 CONSTIPATION, UNSPECIFIED: ICD-10-CM

## 2025-04-21 DIAGNOSIS — K08.199 COMPLETE LOSS OF TEETH DUE TO OTHER SPECIFIED CAUSE, UNSPECIFIED CLASS: Chronic | ICD-10-CM

## 2025-04-21 PROCEDURE — 99214 OFFICE O/P EST MOD 30 MIN: CPT

## 2025-04-21 PROCEDURE — 99214 OFFICE O/P EST MOD 30 MIN: CPT | Mod: GC

## 2025-04-21 RX ORDER — MAG HYDROX/ALUMINUM HYD/SIMETH 400-400-40
400-400-40 SUSPENSION, ORAL (FINAL DOSE FORM) ORAL AS DIRECTED
Qty: 1 | Refills: 0 | Status: ACTIVE | COMMUNITY
Start: 2025-04-21

## 2025-04-21 NOTE — END OF VISIT
[] : Resident [FreeTextEntry3] : Pt. is a 60yo male with Down syndrome, pheochromocytoma left adrenal gland, s/p robot-assisted laparoscopic left adrenalectomy 10/30/24, dementia, MICKY, constipation on senna/maloox; here to establish care.  Pt./parents defers Tdap vaccine since pt. will be uncooperative with vaccination.  Blood work and EKG ordered.  Medication renewed.  Pt. is following GI for elective inpatient EGD/colonoscopy.   ENT referral for cerumen impaction.  Follow surgery Dr. Fletcher s/p adrenalectomy, and hem/onc for pheochromocytoma.  RTC 3 months

## 2025-04-21 NOTE — ASSESSMENT
[Vaccines Reviewed] : Immunizations reviewed today. Please see immunization details in the vaccine log within the immunization flowsheet.  [FreeTextEntry1] : 58 yo man with hx of Down syndrome, dementia, left sided pheochromocytoma s/p robotic resection of left adrenal gland, and chronic constipation is here to establish care.  #Down syndrome #Dementia  #stool and urine incontinent  -He was verbal until 2016, and he was able to make his needs known at that time. At that time, he became less verbal and withdrawn and he was diagnosed with dementia. He is mostly nonverbal at this time -He has a 12-hour home health aide. He lives at home with his mother, who is 85 years old with several chronic conditions. -Activities > During the day, he watches TV, and he may doze off at times but does not formally take a nap. In better weather, he goes out with his home health aide and walks, but never alone - Neurology follow up   #Chronic constipation -c/w laxatives, senna, miralax   #pheochromocytoma s/p robotic resection of left adrenal gland -found to have an incidental adrenal mass on imaging when he was admitted to the hospital in Oct 24 ( admitted for RLQ pain at that time) -In the evaluation of his adrenal mass, he did have an evaluation of hormone secretion. The normetanephrine level was 296.8, with a stated range of 0-244. Urinary metanephrines were normal. He underwent a robotic left adrenalectomy on October 30. and it showed pheochromocytoma -following with heme-onc, not on active treatment, he is on active surveillance, need to do CT CAP in Jan 2026 and follow up with heme onc in 3 months with BP monitoring and repeat plasma free or 24-hour urine fractionated metanephrines and normetanephrines.  #B/L cerumen in ears - refer to ENT   #HCM - check labs with MMRV ab, Hep B sAG, hep B c AB, Hep B s AB and Hep C RNA and HIV (He has an aversion to having blood work/phlebotomy)  - colonoscopy > was supposed to undergo inpatient EGD/Colonoscopy with GI but then postponed till he gets clearance from endocrine for his pheochromocytoma - vaccines > mom took he took all his shots when he was young, does not remember when was the last TDAP shot, offered to give today but patient will start fighting per the mother and won't let us give him the shot  - RTC in 3 months

## 2025-04-21 NOTE — HISTORY OF PRESENT ILLNESS
[Parent] : parent [FreeTextEntry1] : to establish care accompanied by his mother ARLEEN REMY  [de-identified] : This 59-year-old man has Down syndrome and dementia and left sided pheochromocytoma s/p robotic left adrenalectomy. the patient was verbal until 2016, and he was able to make his needs known at that time. At that time, he became less verbal and withdrawn and he was diagnosed with dementia. He is mostly nonverbal at this time, and he has a 12-hour home health aide. He lives at home with his mother, who is 85 years old with several chronic conditions. During the day, he watches TV, and he may doze off at times but does not formally take a nap. In better weather, he goes out with his home health aide and walks, but never alone. His appetite is good. There is no weight loss. He feeds himself and has no difficulty in swallowing. He is incontinent of urine and stool, and this has been the case for at least 3 to 4 years. Previously, he would tell others that he needed to get to the bathroom. He is not in any formal activities program. He was admitted to the hospital in Oct 24 with a complaint of pain, and an incidental finding was noted in the adrenal gland on a CAT scan. He does not complain of any pain at this time. He has not lost any weight. There is no nausea or vomiting complaint. He does have constipation and takes laxatives. He did not have notable blood pressure elevations. In the evaluation of his adrenal mass, he did have an evaluation of hormone secretion. The normetanephrine level was 296.8, with a stated range of 0-244. Urinary metanephrines were normal. He underwent a robotic left adrenalectomy on October 30. biopsy proven pheochromocytoma for which he is following with heme onc   today doing well, no changes or complaints per mother, only concern is that he needs blood work but will not let anyone do it, he will get agitated and start fighting

## 2025-04-21 NOTE — PHYSICAL EXAM
[No Acute Distress] : no acute distress [Normal Sclera/Conjunctiva] : normal sclera/conjunctiva [Normal Outer Ear/Nose] : the outer ears and nose were normal in appearance [No JVD] : no jugular venous distention [No Lymphadenopathy] : no lymphadenopathy [No Respiratory Distress] : no respiratory distress  [No Accessory Muscle Use] : no accessory muscle use [Clear to Auscultation] : lungs were clear to auscultation bilaterally [Normal Rate] : normal rate  [Regular Rhythm] : with a regular rhythm [Normal S1, S2] : normal S1 and S2 [No Carotid Bruits] : no carotid bruits [No Edema] : there was no peripheral edema [Soft] : abdomen soft [Non Tender] : non-tender [Non-distended] : non-distended [No HSM] : no HSM [No CVA Tenderness] : no CVA  tenderness [No Joint Swelling] : no joint swelling [No Rash] : no rash [de-identified] : baseline non verbal

## 2025-04-22 DIAGNOSIS — K59.00 CONSTIPATION, UNSPECIFIED: ICD-10-CM

## 2025-04-22 DIAGNOSIS — H61.23 IMPACTED CERUMEN, BILATERAL: ICD-10-CM

## 2025-04-22 DIAGNOSIS — Z00.00 ENCOUNTER FOR GENERAL ADULT MEDICAL EXAMINATION WITHOUT ABNORMAL FINDINGS: ICD-10-CM

## 2025-04-22 DIAGNOSIS — D35.02 BENIGN NEOPLASM OF LEFT ADRENAL GLAND: ICD-10-CM

## 2025-05-22 NOTE — ASU PATIENT PROFILE, ADULT - NS PRO PASSIVE SMOKE EXP
Called patient to remind to schedule PET scan. He states he's unable to schedule right now, but will call later today. Will continue to watch for scheduling.    No

## 2025-06-10 ENCOUNTER — APPOINTMENT (OUTPATIENT)
Dept: SURGERY | Facility: CLINIC | Age: 60
End: 2025-06-10
Payer: MEDICARE

## 2025-06-10 VITALS
HEART RATE: 83 BPM | SYSTOLIC BLOOD PRESSURE: 118 MMHG | DIASTOLIC BLOOD PRESSURE: 70 MMHG | BODY MASS INDEX: 25.34 KG/M2 | HEIGHT: 63 IN | OXYGEN SATURATION: 97 % | WEIGHT: 143 LBS

## 2025-06-10 PROCEDURE — 99214 OFFICE O/P EST MOD 30 MIN: CPT

## 2025-06-14 NOTE — ASSESSMENT
[FreeTextEntry1] : ROBYN REMY  is a pleasant 59 year old man  who came in 08/15/2024 for left retroperitoneal mass~  . He has Dr DANNY RAMIREZ as His PCP.  he was recently in hospital for abdominal pain with ct suggested mesenteric mass but mri suggested 2.8cm left retroperitoneal mass arising from adrenal with possible pheochromocytoma origin  he has Down syndrome, he came with his mom and home health aide  he takes senna and miralax he lost weight recently 20 pounds as per his mom   will send for metabolic work up for his adrenal mass, will send for tumor markers for his weight loss, will see in couple week   09/05/2024 : no new reported symptoms, exam is unchanged, metabolic work up is negative, will send for MIBG scan as gallium dotatate was not feasible given his need for anasthesia   11/12/24 returns today post excision of left adrenal and retroperitoneal mass resection on October 30, 2024. Abdomen soft non tender, non -distended. Doing well per mother. Activity at baseline. Path reviewed with mother. 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 See note. - Surgical margins are negative for tumor (< 1mm from inked resection margin).   03/11/2025 : reported constipation by mother,  exam is unchanged, needs to follow up with primary care physician and medical oncology to have labs and CT, mother indicated that this is a challenge to be done given his resistance to labs and CT and need for sedation to have these done  Plan -> medical oncology evaluation Will see in office in four months Encouraged to call office with any questions or concerns  06/10/2025 : no new reported symptoms, exam is unchanged, advised his mother he needs to go to labs and imaging as per med onc, however she indicated this is quite challenging with his mental disability will see him in 6 months  the above plan of care with discussed in details to the patient and all questions were answered to patient satisfaction. patient instructed to follow up with the referring physician and patient primary care provider   A total of    30 minutes was spent on this visit, obtaining h/p,  reviewing previous notes/imaging, counseling the patient on f/u , ordering tests (below), and documenting the findings in the note.

## 2025-06-14 NOTE — REASON FOR VISIT
[de-identified] : left adrenalectomy and retroperitoneal mass resection [de-identified] : 10/30/24 [Family Member] : family member

## 2025-06-14 NOTE — PHYSICAL EXAM
[Normal] : supple, no neck mass and thyroid not enlarged [Normal Neck Lymph Nodes] : normal neck lymph nodes  [Normal Supraclavicular Lymph Nodes] : normal supraclavicular lymph nodes [Normal Groin Lymph Nodes] : normal groin lymph nodes [Normal Axillary Lymph Nodes] : normal axillary lymph nodes [Normal] : normal appearance, no rash, nodules, vesicles, ulcers, erythema [de-identified] : soft non tender, non distended

## 2025-06-14 NOTE — HISTORY OF PRESENT ILLNESS
[de-identified] : ROBYN REMY  is a pleasant 59 year old man  who came in 08/15/2024 for left retroperitoneal mass~  . He has Dr DANNY RAMIREZ as His PCP.  he was recently in hospital for abdominal pain with ct suggested mesenteric mass but mri suggested 2.8cm left retroperitoneal mass arising from adrenal with possible pheochromocytoma origin  he has Down syndrome, he came with his mom and home health aide  he takes senna and miralax he lost weight recently 20 pounds as per his mom  11/12/24 post operative visit [Post-Op Complications] : No post-op complications reported

## 2025-06-19 ENCOUNTER — APPOINTMENT (OUTPATIENT)
Dept: OTOLARYNGOLOGY | Facility: CLINIC | Age: 60
End: 2025-06-19

## 2025-06-19 PROCEDURE — 69210 REMOVE IMPACTED EAR WAX UNI: CPT

## 2025-06-19 PROCEDURE — 99204 OFFICE O/P NEW MOD 45 MIN: CPT | Mod: 25

## 2025-06-19 NOTE — HISTORY OF PRESENT ILLNESS
[de-identified] : 59-year-old male presents for initial evaluation for: clogged ears, hearing test. Accompanied by mother. Patient has down syndrome. Mother believes he is not able to hear very well. Here today for ear checkup. No complaints. Mother and aid present.

## 2025-07-10 ENCOUNTER — OUTPATIENT (OUTPATIENT)
Dept: OUTPATIENT SERVICES | Facility: HOSPITAL | Age: 60
LOS: 1 days | End: 2025-07-10
Payer: MEDICARE

## 2025-07-10 ENCOUNTER — NON-APPOINTMENT (OUTPATIENT)
Age: 60
End: 2025-07-10

## 2025-07-10 ENCOUNTER — APPOINTMENT (OUTPATIENT)
Dept: SPEECH THERAPY | Facility: CLINIC | Age: 60
End: 2025-07-10

## 2025-07-10 DIAGNOSIS — H91.90 UNSPECIFIED HEARING LOSS, UNSPECIFIED EAR: ICD-10-CM

## 2025-07-10 DIAGNOSIS — K08.199 COMPLETE LOSS OF TEETH DUE TO OTHER SPECIFIED CAUSE, UNSPECIFIED CLASS: Chronic | ICD-10-CM

## 2025-07-10 DIAGNOSIS — H90.3 SENSORINEURAL HEARING LOSS, BILATERAL: ICD-10-CM

## 2025-07-10 PROCEDURE — 92550 TYMPANOMETRY & REFLEX THRESH: CPT

## 2025-07-10 PROCEDURE — 92579 VISUAL AUDIOMETRY (VRA): CPT

## 2025-07-21 ENCOUNTER — APPOINTMENT (OUTPATIENT)
Dept: PEDIATRIC DEVELOPMENTAL SERVICES | Facility: CLINIC | Age: 60
End: 2025-07-21
Payer: MEDICARE

## 2025-07-21 ENCOUNTER — OUTPATIENT (OUTPATIENT)
Dept: OUTPATIENT SERVICES | Facility: HOSPITAL | Age: 60
LOS: 1 days | End: 2025-07-21
Payer: MEDICARE

## 2025-07-21 ENCOUNTER — NON-APPOINTMENT (OUTPATIENT)
Age: 60
End: 2025-07-21

## 2025-07-21 VITALS
TEMPERATURE: 97.6 F | WEIGHT: 146.13 LBS | DIASTOLIC BLOOD PRESSURE: 69 MMHG | BODY MASS INDEX: 25.89 KG/M2 | HEIGHT: 63 IN | SYSTOLIC BLOOD PRESSURE: 105 MMHG | OXYGEN SATURATION: 97 % | HEART RATE: 77 BPM

## 2025-07-21 DIAGNOSIS — Q90.9 DOWN SYNDROME, UNSPECIFIED: ICD-10-CM

## 2025-07-21 DIAGNOSIS — Z87.898 PERSONAL HISTORY OF OTHER SPECIFIED CONDITIONS: ICD-10-CM

## 2025-07-21 DIAGNOSIS — R19.00 INTRA-ABDOMINAL AND PELVIC SWELLING, MASS AND LUMP, UNSPECIFIED SITE: ICD-10-CM

## 2025-07-21 DIAGNOSIS — D35.00 BENIGN NEOPLASM OF UNSPECIFIED ADRENAL GLAND: ICD-10-CM

## 2025-07-21 DIAGNOSIS — K08.199 COMPLETE LOSS OF TEETH DUE TO OTHER SPECIFIED CAUSE, UNSPECIFIED CLASS: Chronic | ICD-10-CM

## 2025-07-21 DIAGNOSIS — K59.00 CONSTIPATION, UNSPECIFIED: ICD-10-CM

## 2025-07-21 DIAGNOSIS — Z00.00 ENCOUNTER FOR GENERAL ADULT MEDICAL EXAMINATION WITHOUT ABNORMAL FINDINGS: ICD-10-CM

## 2025-07-21 DIAGNOSIS — Z86.018 PERSONAL HISTORY OF OTHER BENIGN NEOPLASM: ICD-10-CM

## 2025-07-21 PROCEDURE — 99214 OFFICE O/P EST MOD 30 MIN: CPT | Mod: GC

## 2025-07-21 PROCEDURE — 99214 OFFICE O/P EST MOD 30 MIN: CPT

## 2025-07-21 NOTE — ASSESSMENT
[Vaccines Reviewed] : Immunizations reviewed today. Please see immunization details in the vaccine log within the immunization flowsheet.  [FreeTextEntry1] : 60 year old man with PMH of Down syndrome, dementia, left sided pheochromocytoma s/p robotic resection of left adrenal gland, and chronic constipation here for follow-up.  #Down syndrome #Dementia  -He was verbal until 2016, and he was able to make his needs known at that time. At that time, he became less verbal and withdrawn and he was diagnosed with dementia. He is mostly nonverbal at this time -He has a 12-hour home health aide. He lives at home with his mother, who is 86 years old with several chronic conditions. -Activities > During the day, he watches TV, and he may doze off at times but does not formally take a nap. In better weather, he goes out with his home health aide and walks, but never alone  #Chronic constipation - Maalox  #Pheochromocytoma s/p robotic resection of left adrenal gland - /69 - Found to have an incidental adrenal mass on imaging when he was admitted to the hospital in Oct 24 ( admitted for RLQ pain at that time) - In the evaluation of his adrenal mass, he did have an evaluation of hormone secretion. The normetanephrine level was 296.8, with a stated range of 0-244. Urinary metanephrines were normal. He underwent a robotic left adrenalectomy on October 30. and it showed pheochromocytoma - Following with heme-onc, not on active treatment, he is on active surveillance, need to do CT CAP in Jan 2026 and follow up with heme onc in 3 months with BP monitoring and repeat plasma free or 24-hour urine fractionated metanephrines and normetanephrines.  #HCM - Check labs with MMRV ab, Hep B sAG, hep B c AB, Hep B s AB and Hep C RNA and HIV (He has an aversion to having blood work/phlebotomy)  - Colonoscopy > was supposed to undergo inpatient EGD/Colonoscopy with GI but then postponed till he gets clearance from endocrine for his pheochromocytoma - Vaccines > mom took he took all his shots when he was young, does not remember when was the last TDAP shot, offered to give today but patient will start fighting per the mother and won't let us give him the shot  - RTC in 3 months

## 2025-07-21 NOTE — PHYSICAL EXAM
[No Acute Distress] : no acute distress [Normal Sclera/Conjunctiva] : normal sclera/conjunctiva [Normal Outer Ear/Nose] : the outer ears and nose were normal in appearance [No JVD] : no jugular venous distention [No Lymphadenopathy] : no lymphadenopathy [No Respiratory Distress] : no respiratory distress  [No Accessory Muscle Use] : no accessory muscle use [Clear to Auscultation] : lungs were clear to auscultation bilaterally [Normal Rate] : normal rate  [Regular Rhythm] : with a regular rhythm [Normal S1, S2] : normal S1 and S2 [No Carotid Bruits] : no carotid bruits [No Edema] : there was no peripheral edema [Soft] : abdomen soft [Non Tender] : non-tender [Non-distended] : non-distended [No HSM] : no HSM [No CVA Tenderness] : no CVA  tenderness [No Joint Swelling] : no joint swelling [No Rash] : no rash [de-identified] : baseline non verbal

## 2025-07-21 NOTE — HISTORY OF PRESENT ILLNESS
[Parent] : parent [de-identified] : 60-year-old man with PMH of Down syndrome, dementia, and left sided pheochromocytoma s/p robotic left adrenalectomy. The patient was verbal until 2016, and he was able to make his needs known at that time. At that time, he became less verbal and withdrawn and he was diagnosed with dementia. He is mostly nonverbal at this time, and he has a 12-hour home health aide. He lives at home with his mother, who is 86 years old with several chronic conditions. During the day, he watches TV, and he may doze off at times but does not formally take a nap. In better weather, he goes out with his home health aide and walks, but never alone. His appetite is good. There is no weight loss. He feeds himself and has no difficulty in swallowing. He is incontinent of urine and stool, and this has been the case for at least 3 to 4 years. Previously, he would tell others that he needed to get to the bathroom. He is not in any formal activities program. He was admitted to the hospital in Oct 24 with a complaint of pain, and an incidental finding was noted in the adrenal gland on a CAT scan.  In the evaluation of his adrenal mass, he did have an evaluation of hormone secretion. The normetanephrine level was 296.8, with a stated range of 0-244. Urinary metanephrines were normal. He underwent a robotic left adrenalectomy on October 30. biopsy proven pheochromocytoma for which he is following with [facility] onc.  today doing well, no changes or complaints per mother, only concern is that he needs blood work but will not let anyone do it, he will get agitated and start fighting   [FreeTextEntry1] : to establish care accompanied by his mother ARLEEN REMY

## 2025-07-21 NOTE — END OF VISIT
[] : Resident [FreeTextEntry3] : Pt. here for follow up.  Pt./parents declines Tdap vaccine since uncooperative with vaccination.  Pt's parent did not complete blood work ordered 4/21/25, advised parent to complete blood work.  Medication renewed.  Advised parents to schedule hem/onc appointment for follow up pheochromocytoma.  Follow GI 10/3/25 for EGD/colonoscopy, surgery Dr. Fletcher 12/16/25 for s/p adrenalectomy.  RTC 3 months.

## 2025-07-22 DIAGNOSIS — K59.00 CONSTIPATION, UNSPECIFIED: ICD-10-CM

## 2025-07-22 DIAGNOSIS — Z86.018 PERSONAL HISTORY OF OTHER BENIGN NEOPLASM: ICD-10-CM

## 2025-07-28 ENCOUNTER — OUTPATIENT (OUTPATIENT)
Dept: OUTPATIENT SERVICES | Facility: HOSPITAL | Age: 60
LOS: 1 days | End: 2025-07-28
Payer: MEDICARE

## 2025-07-28 DIAGNOSIS — Z00.00 ENCOUNTER FOR GENERAL ADULT MEDICAL EXAMINATION WITHOUT ABNORMAL FINDINGS: ICD-10-CM

## 2025-07-28 DIAGNOSIS — K08.199 COMPLETE LOSS OF TEETH DUE TO OTHER SPECIFIED CAUSE, UNSPECIFIED CLASS: Chronic | ICD-10-CM

## 2025-07-28 PROCEDURE — 80061 LIPID PANEL: CPT

## 2025-07-28 PROCEDURE — 86704 HEP B CORE ANTIBODY TOTAL: CPT

## 2025-07-28 PROCEDURE — 86705 HEP B CORE ANTIBODY IGM: CPT

## 2025-07-28 PROCEDURE — 83036 HEMOGLOBIN GLYCOSYLATED A1C: CPT

## 2025-07-28 PROCEDURE — 86787 VARICELLA-ZOSTER ANTIBODY: CPT

## 2025-07-28 PROCEDURE — 87389 HIV-1 AG W/HIV-1&-2 AB AG IA: CPT

## 2025-07-28 PROCEDURE — 80053 COMPREHEN METABOLIC PANEL: CPT

## 2025-07-28 PROCEDURE — 86706 HEP B SURFACE ANTIBODY: CPT

## 2025-07-28 PROCEDURE — 82306 VITAMIN D 25 HYDROXY: CPT

## 2025-07-28 PROCEDURE — 86735 MUMPS ANTIBODY: CPT

## 2025-07-28 PROCEDURE — 86762 RUBELLA ANTIBODY: CPT

## 2025-07-28 PROCEDURE — 87522 HEPATITIS C REVRS TRNSCRPJ: CPT

## 2025-07-28 PROCEDURE — 85025 COMPLETE CBC W/AUTO DIFF WBC: CPT

## 2025-07-28 PROCEDURE — 87340 HEPATITIS B SURFACE AG IA: CPT

## 2025-07-28 PROCEDURE — 86765 RUBEOLA ANTIBODY: CPT

## 2025-07-28 PROCEDURE — 84443 ASSAY THYROID STIM HORMONE: CPT

## 2025-07-29 DIAGNOSIS — Z00.00 ENCOUNTER FOR GENERAL ADULT MEDICAL EXAMINATION WITHOUT ABNORMAL FINDINGS: ICD-10-CM

## 2025-08-06 ENCOUNTER — APPOINTMENT (OUTPATIENT)
Dept: OTOLARYNGOLOGY | Facility: CLINIC | Age: 60
End: 2025-08-06

## 2025-08-06 DIAGNOSIS — H61.23 IMPACTED CERUMEN, BILATERAL: ICD-10-CM

## 2025-08-06 DIAGNOSIS — H91.90 UNSPECIFIED HEARING LOSS, UNSPECIFIED EAR: ICD-10-CM

## 2025-08-06 PROCEDURE — 99214 OFFICE O/P EST MOD 30 MIN: CPT | Mod: 25

## 2025-08-06 PROCEDURE — 69210 REMOVE IMPACTED EAR WAX UNI: CPT

## 2025-08-06 RX ORDER — ASPIRIN 81 MG
6.5 TABLET, DELAYED RELEASE (ENTERIC COATED) ORAL
Qty: 1 | Refills: 5 | Status: ACTIVE | COMMUNITY
Start: 2025-08-06 | End: 1900-01-01